# Patient Record
Sex: FEMALE | Race: WHITE | NOT HISPANIC OR LATINO | Employment: OTHER | URBAN - METROPOLITAN AREA
[De-identification: names, ages, dates, MRNs, and addresses within clinical notes are randomized per-mention and may not be internally consistent; named-entity substitution may affect disease eponyms.]

---

## 2017-05-02 ENCOUNTER — GENERIC CONVERSION - ENCOUNTER (OUTPATIENT)
Dept: OTHER | Facility: OTHER | Age: 82
End: 2017-05-02

## 2017-05-11 ENCOUNTER — TRANSCRIBE ORDERS (OUTPATIENT)
Dept: MRI IMAGING | Facility: CLINIC | Age: 82
End: 2017-05-11

## 2017-05-11 ENCOUNTER — APPOINTMENT (OUTPATIENT)
Dept: LAB | Facility: CLINIC | Age: 82
End: 2017-05-11
Payer: COMMERCIAL

## 2017-05-11 DIAGNOSIS — E78.5 HYPERLIPIDEMIA: ICD-10-CM

## 2017-05-11 DIAGNOSIS — I10 ESSENTIAL (PRIMARY) HYPERTENSION: ICD-10-CM

## 2017-05-11 DIAGNOSIS — R73.01 IMPAIRED FASTING GLUCOSE: ICD-10-CM

## 2017-05-11 LAB
ALBUMIN SERPL BCP-MCNC: 3.7 G/DL (ref 3.5–5)
ALP SERPL-CCNC: 75 U/L (ref 46–116)
ALT SERPL W P-5'-P-CCNC: 26 U/L (ref 12–78)
ANION GAP SERPL CALCULATED.3IONS-SCNC: 5 MMOL/L (ref 4–13)
AST SERPL W P-5'-P-CCNC: 12 U/L (ref 5–45)
BASOPHILS # BLD AUTO: 0.05 THOUSANDS/ΜL (ref 0–0.1)
BASOPHILS NFR BLD AUTO: 1 % (ref 0–1)
BILIRUB DIRECT SERPL-MCNC: 0.11 MG/DL (ref 0–0.2)
BILIRUB SERPL-MCNC: 0.51 MG/DL (ref 0.2–1)
BUN SERPL-MCNC: 24 MG/DL (ref 5–25)
CALCIUM SERPL-MCNC: 9.2 MG/DL (ref 8.3–10.1)
CHLORIDE SERPL-SCNC: 106 MMOL/L (ref 100–108)
CHOLEST SERPL-MCNC: 187 MG/DL (ref 50–200)
CO2 SERPL-SCNC: 30 MMOL/L (ref 21–32)
CREAT SERPL-MCNC: 0.86 MG/DL (ref 0.6–1.3)
EOSINOPHIL # BLD AUTO: 0.29 THOUSAND/ΜL (ref 0–0.61)
EOSINOPHIL NFR BLD AUTO: 5 % (ref 0–6)
ERYTHROCYTE [DISTWIDTH] IN BLOOD BY AUTOMATED COUNT: 13.9 % (ref 11.6–15.1)
EST. AVERAGE GLUCOSE BLD GHB EST-MCNC: 120 MG/DL
GFR SERPL CREATININE-BSD FRML MDRD: >60 ML/MIN/1.73SQ M
GLUCOSE P FAST SERPL-MCNC: 91 MG/DL (ref 65–99)
HBA1C MFR BLD: 5.8 % (ref 4.2–6.3)
HCT VFR BLD AUTO: 43.1 % (ref 34.8–46.1)
HDLC SERPL-MCNC: 59 MG/DL (ref 40–60)
HGB BLD-MCNC: 14.3 G/DL (ref 11.5–15.4)
LDLC SERPL CALC-MCNC: 104 MG/DL (ref 0–100)
LYMPHOCYTES # BLD AUTO: 2.29 THOUSANDS/ΜL (ref 0.6–4.47)
LYMPHOCYTES NFR BLD AUTO: 37 % (ref 14–44)
MCH RBC QN AUTO: 30.6 PG (ref 26.8–34.3)
MCHC RBC AUTO-ENTMCNC: 33.2 G/DL (ref 31.4–37.4)
MCV RBC AUTO: 92 FL (ref 82–98)
MONOCYTES # BLD AUTO: 0.58 THOUSAND/ΜL (ref 0.17–1.22)
MONOCYTES NFR BLD AUTO: 9 % (ref 4–12)
NEUTROPHILS # BLD AUTO: 3.01 THOUSANDS/ΜL (ref 1.85–7.62)
NEUTS SEG NFR BLD AUTO: 48 % (ref 43–75)
NRBC BLD AUTO-RTO: 0 /100 WBCS
PLATELET # BLD AUTO: 264 THOUSANDS/UL (ref 149–390)
PMV BLD AUTO: 10.7 FL (ref 8.9–12.7)
POTASSIUM SERPL-SCNC: 4.8 MMOL/L (ref 3.5–5.3)
PROT SERPL-MCNC: 7.4 G/DL (ref 6.4–8.2)
RBC # BLD AUTO: 4.68 MILLION/UL (ref 3.81–5.12)
SODIUM SERPL-SCNC: 141 MMOL/L (ref 136–145)
TRIGL SERPL-MCNC: 122 MG/DL
WBC # BLD AUTO: 6.23 THOUSAND/UL (ref 4.31–10.16)

## 2017-05-11 PROCEDURE — 80048 BASIC METABOLIC PNL TOTAL CA: CPT

## 2017-05-11 PROCEDURE — 80061 LIPID PANEL: CPT

## 2017-05-11 PROCEDURE — 83036 HEMOGLOBIN GLYCOSYLATED A1C: CPT

## 2017-05-11 PROCEDURE — 85025 COMPLETE CBC W/AUTO DIFF WBC: CPT

## 2017-05-11 PROCEDURE — 80076 HEPATIC FUNCTION PANEL: CPT

## 2017-05-11 PROCEDURE — 36415 COLL VENOUS BLD VENIPUNCTURE: CPT

## 2017-05-18 ENCOUNTER — ALLSCRIPTS OFFICE VISIT (OUTPATIENT)
Dept: OTHER | Facility: OTHER | Age: 82
End: 2017-05-18

## 2017-10-17 ENCOUNTER — GENERIC CONVERSION - ENCOUNTER (OUTPATIENT)
Dept: OTHER | Facility: OTHER | Age: 82
End: 2017-10-17

## 2017-11-01 DIAGNOSIS — R73.01 IMPAIRED FASTING GLUCOSE: ICD-10-CM

## 2017-11-01 DIAGNOSIS — E78.5 HYPERLIPIDEMIA: ICD-10-CM

## 2018-01-02 ENCOUNTER — APPOINTMENT (OUTPATIENT)
Dept: LAB | Facility: CLINIC | Age: 83
End: 2018-01-02
Payer: COMMERCIAL

## 2018-01-02 DIAGNOSIS — E78.5 HYPERLIPIDEMIA: ICD-10-CM

## 2018-01-02 DIAGNOSIS — R73.01 IMPAIRED FASTING GLUCOSE: ICD-10-CM

## 2018-01-02 LAB
ALBUMIN SERPL BCP-MCNC: 3.8 G/DL (ref 3.5–5)
ALP SERPL-CCNC: 81 U/L (ref 46–116)
ALT SERPL W P-5'-P-CCNC: 23 U/L (ref 12–78)
ANION GAP SERPL CALCULATED.3IONS-SCNC: 5 MMOL/L (ref 4–13)
AST SERPL W P-5'-P-CCNC: 13 U/L (ref 5–45)
BILIRUB SERPL-MCNC: 0.49 MG/DL (ref 0.2–1)
BUN SERPL-MCNC: 23 MG/DL (ref 5–25)
CALCIUM SERPL-MCNC: 9.6 MG/DL (ref 8.3–10.1)
CHLORIDE SERPL-SCNC: 106 MMOL/L (ref 100–108)
CHOLEST SERPL-MCNC: 212 MG/DL (ref 50–200)
CO2 SERPL-SCNC: 29 MMOL/L (ref 21–32)
CREAT SERPL-MCNC: 0.97 MG/DL (ref 0.6–1.3)
ERYTHROCYTE [DISTWIDTH] IN BLOOD BY AUTOMATED COUNT: 13.4 % (ref 11.6–15.1)
EST. AVERAGE GLUCOSE BLD GHB EST-MCNC: 126 MG/DL
GFR SERPL CREATININE-BSD FRML MDRD: 52 ML/MIN/1.73SQ M
GLUCOSE P FAST SERPL-MCNC: 109 MG/DL (ref 65–99)
HBA1C MFR BLD: 6 % (ref 4.2–6.3)
HCT VFR BLD AUTO: 43.2 % (ref 34.8–46.1)
HDLC SERPL-MCNC: 57 MG/DL (ref 40–60)
HGB BLD-MCNC: 14.3 G/DL (ref 11.5–15.4)
LDLC SERPL CALC-MCNC: 124 MG/DL (ref 0–100)
MCH RBC QN AUTO: 30.2 PG (ref 26.8–34.3)
MCHC RBC AUTO-ENTMCNC: 33.1 G/DL (ref 31.4–37.4)
MCV RBC AUTO: 91 FL (ref 82–98)
PLATELET # BLD AUTO: 264 THOUSANDS/UL (ref 149–390)
PMV BLD AUTO: 10.5 FL (ref 8.9–12.7)
POTASSIUM SERPL-SCNC: 5.2 MMOL/L (ref 3.5–5.3)
PROT SERPL-MCNC: 7.9 G/DL (ref 6.4–8.2)
RBC # BLD AUTO: 4.74 MILLION/UL (ref 3.81–5.12)
SODIUM SERPL-SCNC: 140 MMOL/L (ref 136–145)
TRIGL SERPL-MCNC: 155 MG/DL
WBC # BLD AUTO: 6.33 THOUSAND/UL (ref 4.31–10.16)

## 2018-01-02 PROCEDURE — 80053 COMPREHEN METABOLIC PANEL: CPT

## 2018-01-02 PROCEDURE — 85027 COMPLETE CBC AUTOMATED: CPT

## 2018-01-02 PROCEDURE — 36415 COLL VENOUS BLD VENIPUNCTURE: CPT

## 2018-01-02 PROCEDURE — 83036 HEMOGLOBIN GLYCOSYLATED A1C: CPT

## 2018-01-02 PROCEDURE — 80061 LIPID PANEL: CPT

## 2018-01-09 ENCOUNTER — ALLSCRIPTS OFFICE VISIT (OUTPATIENT)
Dept: OTHER | Facility: OTHER | Age: 83
End: 2018-01-09

## 2018-01-10 NOTE — PROGRESS NOTES
Assessment   1  Hyperlipidemia (272 4) (E78 5)   2  Impaired fasting glucose (790 21) (R73 01)   3  Macular degeneration (362 50) (H35 30)   4  Glaucoma (365 9) (H40 9)   5  Encounter for preventive health examination (V70 0) (Z00 00)    Plan   Hyperlipidemia, Hypertension, Impaired fasting glucose    · (1) CBC/ PLT (NO DIFF); Status:Active; Requested for:[id number];    · (1) COMPREHENSIVE METABOLIC PANEL; Status:Active; Requested for:[id number];    · (1) HEMOGLOBIN A1C; Status:Active; Requested for:70Jlo1649;    · (1) LIPID PANEL, FASTING; Status:Active; Requested for:43Zus8429;    · (1) TSH; Status:Active; Requested for:25Wks3825; Discussion/Summary   Discussion Summary:     continue atorvastatin, exercise and diet   no indication for rx     maintenance she declines further bone densities or mammograms   and macular degeneration under care of ophthalmology   fasting glucose stable with A1c 6 0 discussed them importance of monitoring the diet   follow-up after labs in 6 months, sooner as needed has advanced directives, will bring for our files  Counseling Documentation With Imm: The patient was counseled regarding  Medication SE Review and Pt Understands Tx: Possible side effects of new medications were reviewed with the patient/guardian today  The treatment plan was reviewed with the patient/guardian  The patient/guardian understands and agrees with the treatment plan      Chief Complaint   Chief Complaint Free Text Note Form: routine follow up review labs      History of Present Illness   HPI: active with walking while weather is good phsyical concerns prefer no further mammo or dexa- refuses bisphos to watch what she eats   w/ ophtho for glaucoma and mac deg      Review of Systems   Complete-Female:      Constitutional: No fever, no chills, feels well, no tiredness, no recent weight gain or weight loss        Eyes: No complaints of eye pain, no red eyes, no eyesight problems, no discharge, no dry eyes, no itching of eyes  ENT: no complaints of earache, no loss of hearing, no nose bleeds, no nasal discharge, no sore throat, no hoarseness  Cardiovascular: No complaints of slow heart rate, no fast heart rate, no chest pain, no palpitations, no leg claudication, no lower extremity edema  Respiratory: No complaints of shortness of breath, no wheezing, no cough, no SOB on exertion, no orthopnea, no PND  Gastrointestinal: No complaints of abdominal pain, no constipation, no nausea or vomiting, no diarrhea, no bloody stools  Genitourinary: No complaints of dysuria, no incontinence, no pelvic pain, no dysmenorrhea, no vaginal discharge or bleeding  Musculoskeletal: No complaints of arthralgias, no myalgias, no joint swelling or stiffness, no limb pain or swelling  Integumentary: No complaints of skin rash or lesions, no itching, no skin wounds, no breast pain or lump  Neurological: No complaints of headache, no confusion, no convulsions, no numbness, no dizziness or fainting, no tingling, no limb weakness, no difficulty walking  Psychiatric: Not suicidal, no sleep disturbance, no anxiety or depression, no change in personality, no emotional problems  Endocrine: No complaints of proptosis, no hot flashes, no muscle weakness, no deepening of the voice, no feelings of weakness  Hematologic/Lymphatic: No complaints of swollen glands, no swollen glands in the neck, does not bleed easily, does not bruise easily  Active Problems   1  Active advance directive (V49 89) (Z78 9)   2  Acute pain of left knee (719 46) (M25 562)   3  Advance directive discussed with patient (V65 49) (Z71 89)   4  BPPV (benign paroxysmal positional vertigo) (386 11) (H81 10)   5  Esophageal reflux (530 81) (K21 9)   6  Flu vaccine need (V04 81) (Z23)   7  Glaucoma (365 9) (H40 9)   8  Hyperlipidemia (272 4) (E78 5)   9  Hypertension (401 9) (I10)   10   Impaired fasting glucose (790 21) (R73 01) 11  Macular degeneration (362 50) (H35 30)   12  Need for pneumococcal vaccine (V03 82) (Z23)   13  Onychomycosis of toenail (110 1) (B35 1)   14  Osteoporosis (733 00) (M81 0)   15  Screening for diabetic peripheral neuropathy (V80 09) (Z13 89)   16  Screening for genitourinary condition (V81 6) (Z13 89)   17  Skin neoplasm (239 2) (D49 2)   18  Vitamin D deficiency (268 9) (E55 9)    Past Medical History   1  History of DEXA Body Composition Study   2  History of hyperlipidemia (V12 29) (Z86 39)   3  History of sciatica (V12 49) (Z86 69)   4  History of Osteoporosis (733 00) (M81 0)  Active Problems And Past Medical History Reviewed: The active problems and past medical history were reviewed and updated today  Surgical History   1  History of  Section  Surgical History Reviewed: The surgical history was reviewed and updated today  Family History   Mother    1  Family history of Type 2 Diabetes Mellitus  Father    2  Family history of Cancer  Sister    3  Family history of Colon Cancer (V16 0)  Family History Reviewed: The family history was reviewed and updated today  Social History    · Active advance directive (V49 89) (Z78 9)   · Alcohol Use (History)   · Denied: History of Drug Use   · Never A Smoker   · Occupation: Retired   · Tobacco non-user (V49 89) (Z78 9)   · Tobacco Non-user  Social History Reviewed: The social history was reviewed and updated today  Current Meds    1  Atorvastatin Calcium 20 MG Oral Tablet; take 1 tablet by mouth once daily; Therapy: [id number] to (Evaluate:2018)  Requested for: [id number]; Last Rx:2017     Ordered   2  Calcium 1200 5747-2482 MG-UNIT Oral Tablet Chewable; Therapy: (Recorded:2014) to Recorded   3  Centrum Silver TABS; Therapy: (Recorded:2014) to Recorded   4  CVS Vitamin D CAPS Recorded   5  Pataday 0 2 % Ophthalmic Solution; Therapy: (Recorded:2014) to Recorded   6   Travatan Z 0 004 % Ophthalmic Solution; Therapy: (Recorded:02Apr2014) to Recorded  Medication List Reviewed: The medication list was reviewed and updated today  Allergies   1  No Known Drug Allergies    Vitals   Vital Signs    Recorded: 90RGF7394 10:44AM   Heart Rate 76   Systolic 089   Diastolic 68   Height 4 ft 11 in   Weight 129 lb 6 oz   BMI Calculated 26 13   BSA Calculated 1 53   O2 Saturation 96     Physical Exam        Constitutional      General appearance: No acute distress, well appearing and well nourished  Eyes      Conjunctiva and lids: No swelling, erythema or discharge  Pulmonary      Respiratory effort: No increased work of breathing or signs of respiratory distress  Auscultation of lungs: Clear to auscultation  Cardiovascular      Auscultation of heart: Normal rate and rhythm, normal S1 and S2, without murmurs  Examination of extremities for edema and/or varicosities: Normal        Abdomen      Abdomen: Non-tender, no masses  Lymphatic      Palpation of lymph nodes in neck: No lymphadenopathy  Musculoskeletal      Gait and station: Normal        Skin      Skin and subcutaneous tissue: Normal without rashes or lesions  Neurologic      Cranial nerves: Cranial nerves 2-12 intact  Results/Data   Falls Risk Assessment (Dx Z13 89 Screen for Neurologic Disorder) [id number] 10:47AM User, Ahs      Test Name Result Flag Reference   Falls Risk      No falls in the past year        Health Management   Health Maintenance   Dexa Scan; every 2 years; Next Due: 20Mar2013;  Overdue    Signatures    Electronically signed by : Loyd Miranda [facility]; Jan 9 2018 12:51PM EST                       (Author)     Electronically signed by : GIN Guzman ; Jan 9 2018 10:42PM EST

## 2018-01-13 VITALS
RESPIRATION RATE: 12 BRPM | BODY MASS INDEX: 25.45 KG/M2 | SYSTOLIC BLOOD PRESSURE: 138 MMHG | HEIGHT: 59 IN | WEIGHT: 126.25 LBS | DIASTOLIC BLOOD PRESSURE: 76 MMHG | HEART RATE: 68 BPM

## 2018-01-22 VITALS
WEIGHT: 129.38 LBS | DIASTOLIC BLOOD PRESSURE: 68 MMHG | HEIGHT: 59 IN | SYSTOLIC BLOOD PRESSURE: 138 MMHG | HEART RATE: 76 BPM | BODY MASS INDEX: 26.08 KG/M2 | OXYGEN SATURATION: 96 %

## 2018-07-16 DIAGNOSIS — E78.5 HYPERLIPIDEMIA: ICD-10-CM

## 2018-07-16 DIAGNOSIS — I10 ESSENTIAL (PRIMARY) HYPERTENSION: ICD-10-CM

## 2018-07-16 DIAGNOSIS — R73.01 IMPAIRED FASTING GLUCOSE: ICD-10-CM

## 2018-07-19 ENCOUNTER — APPOINTMENT (OUTPATIENT)
Dept: LAB | Facility: MEDICAL CENTER | Age: 83
End: 2018-07-19
Payer: COMMERCIAL

## 2018-07-19 DIAGNOSIS — I10 ESSENTIAL (PRIMARY) HYPERTENSION: ICD-10-CM

## 2018-07-19 DIAGNOSIS — E78.5 HYPERLIPIDEMIA: ICD-10-CM

## 2018-07-19 DIAGNOSIS — R73.01 IMPAIRED FASTING GLUCOSE: ICD-10-CM

## 2018-07-19 LAB
ALBUMIN SERPL BCP-MCNC: 3.5 G/DL (ref 3.5–5)
ALP SERPL-CCNC: 76 U/L (ref 46–116)
ALT SERPL W P-5'-P-CCNC: 22 U/L (ref 12–78)
ANION GAP SERPL CALCULATED.3IONS-SCNC: 4 MMOL/L (ref 4–13)
AST SERPL W P-5'-P-CCNC: 16 U/L (ref 5–45)
BILIRUB SERPL-MCNC: 0.55 MG/DL (ref 0.2–1)
BUN SERPL-MCNC: 17 MG/DL (ref 5–25)
CALCIUM SERPL-MCNC: 9 MG/DL (ref 8.3–10.1)
CHLORIDE SERPL-SCNC: 107 MMOL/L (ref 100–108)
CHOLEST SERPL-MCNC: 185 MG/DL (ref 50–200)
CO2 SERPL-SCNC: 28 MMOL/L (ref 21–32)
CREAT SERPL-MCNC: 0.84 MG/DL (ref 0.6–1.3)
ERYTHROCYTE [DISTWIDTH] IN BLOOD BY AUTOMATED COUNT: 13.8 % (ref 11.6–15.1)
EST. AVERAGE GLUCOSE BLD GHB EST-MCNC: 120 MG/DL
GFR SERPL CREATININE-BSD FRML MDRD: 62 ML/MIN/1.73SQ M
GLUCOSE P FAST SERPL-MCNC: 110 MG/DL (ref 65–99)
HBA1C MFR BLD: 5.8 % (ref 4.2–6.3)
HCT VFR BLD AUTO: 42.7 % (ref 34.8–46.1)
HDLC SERPL-MCNC: 56 MG/DL (ref 40–60)
HGB BLD-MCNC: 13.4 G/DL (ref 11.5–15.4)
LDLC SERPL CALC-MCNC: 109 MG/DL (ref 0–100)
MCH RBC QN AUTO: 29.5 PG (ref 26.8–34.3)
MCHC RBC AUTO-ENTMCNC: 31.4 G/DL (ref 31.4–37.4)
MCV RBC AUTO: 94 FL (ref 82–98)
NONHDLC SERPL-MCNC: 129 MG/DL
PLATELET # BLD AUTO: 267 THOUSANDS/UL (ref 149–390)
PMV BLD AUTO: 10.7 FL (ref 8.9–12.7)
POTASSIUM SERPL-SCNC: 4 MMOL/L (ref 3.5–5.3)
PROT SERPL-MCNC: 7.3 G/DL (ref 6.4–8.2)
RBC # BLD AUTO: 4.54 MILLION/UL (ref 3.81–5.12)
SODIUM SERPL-SCNC: 139 MMOL/L (ref 136–145)
TRIGL SERPL-MCNC: 100 MG/DL
TSH SERPL DL<=0.05 MIU/L-ACNC: 2.72 UIU/ML (ref 0.36–3.74)
WBC # BLD AUTO: 5.88 THOUSAND/UL (ref 4.31–10.16)

## 2018-07-19 PROCEDURE — 36415 COLL VENOUS BLD VENIPUNCTURE: CPT

## 2018-07-19 PROCEDURE — 80053 COMPREHEN METABOLIC PANEL: CPT

## 2018-07-19 PROCEDURE — 84443 ASSAY THYROID STIM HORMONE: CPT

## 2018-07-19 PROCEDURE — 80061 LIPID PANEL: CPT

## 2018-07-19 PROCEDURE — 83036 HEMOGLOBIN GLYCOSYLATED A1C: CPT

## 2018-07-19 PROCEDURE — 85027 COMPLETE CBC AUTOMATED: CPT

## 2018-07-23 RX ORDER — TRAVOPROST OPHTHALMIC SOLUTION 0.04 MG/ML
SOLUTION OPHTHALMIC
COMMUNITY
End: 2018-07-24

## 2018-07-23 RX ORDER — OLOPATADINE HYDROCHLORIDE 2 MG/ML
SOLUTION/ DROPS OPHTHALMIC
COMMUNITY
End: 2018-07-24

## 2018-07-23 RX ORDER — ATORVASTATIN CALCIUM 20 MG/1
1 TABLET, FILM COATED ORAL DAILY
COMMUNITY
Start: 2016-08-08 | End: 2018-11-06 | Stop reason: SDUPTHER

## 2018-07-23 RX ORDER — ACETAMINOPHEN 500 MG
TABLET ORAL
COMMUNITY
End: 2018-07-24

## 2018-07-24 ENCOUNTER — OFFICE VISIT (OUTPATIENT)
Dept: INTERNAL MEDICINE CLINIC | Facility: CLINIC | Age: 83
End: 2018-07-24
Payer: COMMERCIAL

## 2018-07-24 VITALS
HEIGHT: 59 IN | SYSTOLIC BLOOD PRESSURE: 118 MMHG | HEART RATE: 64 BPM | BODY MASS INDEX: 25.88 KG/M2 | RESPIRATION RATE: 12 BRPM | WEIGHT: 128.4 LBS | DIASTOLIC BLOOD PRESSURE: 70 MMHG

## 2018-07-24 DIAGNOSIS — E78.2 MIXED HYPERLIPIDEMIA: ICD-10-CM

## 2018-07-24 DIAGNOSIS — E55.9 VITAMIN D DEFICIENCY: Primary | ICD-10-CM

## 2018-07-24 DIAGNOSIS — H35.30 MACULAR DEGENERATION OF BOTH EYES, UNSPECIFIED TYPE: ICD-10-CM

## 2018-07-24 DIAGNOSIS — M81.0 AGE-RELATED OSTEOPOROSIS WITHOUT CURRENT PATHOLOGICAL FRACTURE: ICD-10-CM

## 2018-07-24 DIAGNOSIS — Z12.39 SCREENING FOR BREAST CANCER: ICD-10-CM

## 2018-07-24 DIAGNOSIS — R73.01 IMPAIRED FASTING GLUCOSE: ICD-10-CM

## 2018-07-24 PROCEDURE — 99214 OFFICE O/P EST MOD 30 MIN: CPT | Performed by: INTERNAL MEDICINE

## 2018-07-24 PROCEDURE — 1160F RVW MEDS BY RX/DR IN RCRD: CPT | Performed by: INTERNAL MEDICINE

## 2018-07-24 PROCEDURE — 1036F TOBACCO NON-USER: CPT | Performed by: INTERNAL MEDICINE

## 2018-07-24 PROCEDURE — 4040F PNEUMOC VAC/ADMIN/RCVD: CPT | Performed by: INTERNAL MEDICINE

## 2018-07-24 PROCEDURE — 1101F PT FALLS ASSESS-DOCD LE1/YR: CPT | Performed by: INTERNAL MEDICINE

## 2018-07-24 PROCEDURE — 3725F SCREEN DEPRESSION PERFORMED: CPT | Performed by: INTERNAL MEDICINE

## 2018-07-24 RX ORDER — BRINZOLAMIDE/BRIMONIDINE TARTRATE 10; 2 MG/ML; MG/ML
SUSPENSION/ DROPS OPHTHALMIC
Refills: 0 | COMMUNITY
Start: 2018-06-18

## 2018-07-24 RX ORDER — LATANOPROSTENE BUNOD 0.24 MG/ML
SOLUTION/ DROPS OPHTHALMIC
Refills: 0 | COMMUNITY
Start: 2018-07-05

## 2018-07-24 NOTE — PROGRESS NOTES
Assessment/Plan:    Patient Instructions   Lab data reviewed in detail and compared prior     Hyperlipidemia stable on atorvastatin    Impaired fasting glucose stable with A1c 5 8, no indication to change diet or consider medication, continue with very active lifestyle     Osteoporosis-bone density from 2016 reviewed, we had a lengthy discussion regarding pros and cons of bisphosphonates  Patient has been saving to have her dental work completed and agrees to readdress this issue at our six-month follow-up  Continue with weight-bearing exercise and vitamin-D supplements and dietary calcium  Glaucoma and macular degeneration stable under care of Optometry and Ophthalmology    Health maintenance-up-to-date with the exception of mammogram and we discussed the pros and cons of screening mammogram and she would pursue treatment were occult malignancy detected and therefore will proceed with screening mammogram       Routine follow-up after labs in 6 months, sooner as needed  Diagnoses and all orders for this visit:    Vitamin D deficiency    Macular degeneration of both eyes, unspecified type    Mixed hyperlipidemia  -     CBC; Future  -     Comprehensive metabolic panel; Future  -     Lipid panel; Future  -     TSH, 3rd generation with Free T4 reflex; Future    Age-related osteoporosis without current pathological fracture    Impaired fasting glucose  -     Hemoglobin A1c (w/out EAG); Future    Screening for breast cancer  -     Mammo screening bilateral w 3d & cad; Future    Other orders  -     atorvastatin (LIPITOR) 20 mg tablet; Take 1 tablet by mouth daily  -     Discontinue: Calcium Carbonate-Vit D-Min (CALCIUM 1200) 9380-2677 MG-UNIT CHEW; Chew  -     Multiple Vitamins-Minerals (CENTRUM SILVER 50+WOMEN) TABS; Take by mouth  -     Cholecalciferol (CVS VITAMIN D) 2000 units CAPS; Take by mouth  -     Discontinue: olopatadine HCl (PATADAY) 0 2 % opth drops;  Apply to eye  -     Discontinue: travoprost (TRAVATAN Z) 0 004 % ophthalmic solution; Apply to eye  -     SIMBRINZA 1-0 2 % SUSP;   -     VYZULTA 0 024 % SOLN;          Subjective:      Patient ID: Adilia Levy is a 80 y o  female    Feeling great  [facility] [place], now w/ lots to do and new friends  HPL-tolerating atorvastatin  Vision has been stable, 2 new drops  IFG-watching carbs  Exercising regularly w/ walks and into town  Osteoporosis-she stopped calcium d/t constipation, has dietary calcium dialy, vit d daily  She continues to refuse bisphosphonates or other anti resorptive rx  No issues w/ balance, climbs stairs to second fl apt most times             Current Outpatient Prescriptions:     atorvastatin (LIPITOR) 20 mg tablet, Take 1 tablet by mouth daily, Disp: , Rfl:     Cholecalciferol (CVS VITAMIN D) 2000 units CAPS, Take by mouth, Disp: , Rfl:     Multiple Vitamins-Minerals (CENTRUM SILVER 50+WOMEN) TABS, Take by mouth, Disp: , Rfl:     SIMBRINZA 1-0 2 % SUSP, , Disp: , Rfl: 0    VYZULTA 0 024 % SOLN, , Disp: , Rfl: 0    Recent Results (from the past 1008 hour(s))   CBC    Collection Time: 07/19/18  8:05 AM   Result Value Ref Range    WBC 5 88 4 31 - 10 16 Thousand/uL    RBC 4 54 3 81 - 5 12 Million/uL    Hemoglobin 13 4 11 5 - 15 4 g/dL    Hematocrit 42 7 34 8 - 46 1 %    MCV 94 82 - 98 fL    MCH 29 5 26 8 - 34 3 pg    MCHC 31 4 31 4 - 37 4 g/dL    RDW 13 8 11 6 - 15 1 %    Platelets 593 241 - 143 Thousands/uL    MPV 10 7 8 9 - 12 7 fL   Comprehensive metabolic panel    Collection Time: 07/19/18  8:05 AM   Result Value Ref Range    Sodium 139 136 - 145 mmol/L    Potassium 4 0 3 5 - 5 3 mmol/L    Chloride 107 100 - 108 mmol/L    CO2 28 21 - 32 mmol/L    Anion Gap 4 4 - 13 mmol/L    BUN 17 5 - 25 mg/dL    Creatinine 0 84 0 60 - 1 30 mg/dL    Glucose, Fasting 110 (H) 65 - 99 mg/dL    Calcium 9 0 8 3 - 10 1 mg/dL    AST 16 5 - 45 U/L    ALT 22 12 - 78 U/L    Alkaline Phosphatase 76 46 - 116 U/L    Total Protein 7 3 6 4 - 8 2 g/dL    Albumin 3 5 3 5 - 5 0 g/dL    Total Bilirubin 0 55 0 20 - 1 00 mg/dL    eGFR 62 ml/min/1 73sq m   Hemoglobin A1c    Collection Time: 07/19/18  8:05 AM   Result Value Ref Range    Hemoglobin A1C 5 8 4 2 - 6 3 %     mg/dl   Lipid panel    Collection Time: 07/19/18  8:05 AM   Result Value Ref Range    Cholesterol 185 50 - 200 mg/dL    Triglycerides 100 <=150 mg/dL    HDL, Direct 56 40 - 60 mg/dL    LDL Calculated 109 (H) 0 - 100 mg/dL    Non-HDL-Chol (CHOL-HDL) 129 mg/dl   TSH, 3rd generation    Collection Time: 07/19/18  8:05 AM   Result Value Ref Range    TSH 3RD GENERATON 2 720 0 358 - 3 740 uIU/mL       The following portions of the patient's history were reviewed and updated as appropriate: allergies, current medications, past family history, past medical history, past social history, past surgical history and problem list      Review of Systems   Constitutional: Negative for appetite change, chills, diaphoresis, fatigue, fever and unexpected weight change  HENT: Negative for congestion, hearing loss and rhinorrhea  Eyes: Positive for visual disturbance  Respiratory: Negative for cough, chest tightness, shortness of breath and wheezing  Cardiovascular: Negative for chest pain, palpitations and leg swelling  Gastrointestinal: Negative for abdominal pain and blood in stool  Endocrine: Negative for cold intolerance, heat intolerance, polydipsia and polyuria  Genitourinary: Negative for difficulty urinating, dysuria, frequency and urgency  Musculoskeletal: Negative for arthralgias and myalgias  Skin: Negative for rash  Neurological: Negative for dizziness, weakness, light-headedness and headaches  Hematological: Does not bruise/bleed easily  Psychiatric/Behavioral: Negative for dysphoric mood and sleep disturbance           Objective:      Vitals:    07/24/18 0933   BP: 118/70   Pulse: 64   Resp: 12          Physical Exam   Constitutional: She is oriented to person, place, and time  She appears well-developed and well-nourished  HENT:   Head: Normocephalic and atraumatic  Nose: Nose normal    Mouth/Throat: Oropharynx is clear and moist    Eyes: Conjunctivae and EOM are normal  Pupils are equal, round, and reactive to light  No scleral icterus  Neck: Normal range of motion  Neck supple  No JVD present  No tracheal deviation present  No thyromegaly present  Cardiovascular: Normal rate, regular rhythm and intact distal pulses  Exam reveals no gallop and no friction rub  No murmur heard  Pulmonary/Chest: Effort normal and breath sounds normal  No respiratory distress  She has no wheezes  She has no rales  Musculoskeletal: She exhibits no edema or deformity  Lymphadenopathy:     She has no cervical adenopathy  Neurological: She is alert and oriented to person, place, and time  No cranial nerve deficit  Skin: Skin is warm and dry  No rash noted  No erythema  No pallor  Psychiatric: She has a normal mood and affect   Her behavior is normal  Judgment and thought content normal

## 2018-07-24 NOTE — PATIENT INSTRUCTIONS
Lab data reviewed in detail and compared prior     Hyperlipidemia stable on atorvastatin    Impaired fasting glucose stable with A1c 5 8, no indication to change diet or consider medication, continue with very active lifestyle     Osteoporosis-bone density from 2016 reviewed, we had a lengthy discussion regarding pros and cons of bisphosphonates  Patient has been saving to have her dental work completed and agrees to readdress this issue at our six-month follow-up  Continue with weight-bearing exercise and vitamin-D supplements and dietary calcium  Glaucoma and macular degeneration stable under care of Optometry and Ophthalmology    Health maintenance-up-to-date with the exception of mammogram and we discussed the pros and cons of screening mammogram and she would pursue treatment were occult malignancy detected and therefore will proceed with screening mammogram       Routine follow-up after labs in 6 months, sooner as needed

## 2018-08-14 ENCOUNTER — TELEPHONE (OUTPATIENT)
Dept: INTERNAL MEDICINE CLINIC | Facility: CLINIC | Age: 83
End: 2018-08-14

## 2018-08-14 NOTE — TELEPHONE ENCOUNTER
She needs a Dr's note stating that Dr says she can use the small treadmill in the exercise room  Patient said it is very safe    There is a button she can push and it will stop immediately    If she needs it to    She said this is very helpful, especially in the bad weather when she can't get out & walk  Please mail to her home         [address] [facility] [address]  [place], [address]

## 2018-11-06 DIAGNOSIS — E78.5 HYPERLIPIDEMIA, UNSPECIFIED HYPERLIPIDEMIA TYPE: Primary | ICD-10-CM

## 2018-11-06 RX ORDER — ATORVASTATIN CALCIUM 20 MG/1
20 TABLET, FILM COATED ORAL DAILY
Qty: 90 TABLET | Refills: 3 | Status: SHIPPED | OUTPATIENT
Start: 2018-11-06 | End: 2019-05-31 | Stop reason: SDUPTHER

## 2019-01-28 ENCOUNTER — APPOINTMENT (OUTPATIENT)
Dept: LAB | Facility: MEDICAL CENTER | Age: 84
End: 2019-01-28
Payer: COMMERCIAL

## 2019-01-28 DIAGNOSIS — E78.2 MIXED HYPERLIPIDEMIA: ICD-10-CM

## 2019-01-28 DIAGNOSIS — R73.01 IMPAIRED FASTING GLUCOSE: ICD-10-CM

## 2019-01-28 LAB
ALBUMIN SERPL BCP-MCNC: 3.6 G/DL (ref 3.5–5)
ALP SERPL-CCNC: 98 U/L (ref 46–116)
ALT SERPL W P-5'-P-CCNC: 169 U/L (ref 12–78)
ANION GAP SERPL CALCULATED.3IONS-SCNC: 6 MMOL/L (ref 4–13)
AST SERPL W P-5'-P-CCNC: 92 U/L (ref 5–45)
BILIRUB SERPL-MCNC: 0.48 MG/DL (ref 0.2–1)
BUN SERPL-MCNC: 20 MG/DL (ref 5–25)
CALCIUM SERPL-MCNC: 9 MG/DL (ref 8.3–10.1)
CHLORIDE SERPL-SCNC: 106 MMOL/L (ref 100–108)
CHOLEST SERPL-MCNC: 182 MG/DL (ref 50–200)
CO2 SERPL-SCNC: 25 MMOL/L (ref 21–32)
CREAT SERPL-MCNC: 0.76 MG/DL (ref 0.6–1.3)
ERYTHROCYTE [DISTWIDTH] IN BLOOD BY AUTOMATED COUNT: 13.6 % (ref 11.6–15.1)
EST. AVERAGE GLUCOSE BLD GHB EST-MCNC: 123 MG/DL
GFR SERPL CREATININE-BSD FRML MDRD: 70 ML/MIN/1.73SQ M
GLUCOSE P FAST SERPL-MCNC: 100 MG/DL (ref 65–99)
HBA1C MFR BLD: 5.9 % (ref 4.2–6.3)
HCT VFR BLD AUTO: 45.6 % (ref 34.8–46.1)
HDLC SERPL-MCNC: 50 MG/DL (ref 40–60)
HGB BLD-MCNC: 14.4 G/DL (ref 11.5–15.4)
LDLC SERPL CALC-MCNC: 108 MG/DL (ref 0–100)
MCH RBC QN AUTO: 29.6 PG (ref 26.8–34.3)
MCHC RBC AUTO-ENTMCNC: 31.6 G/DL (ref 31.4–37.4)
MCV RBC AUTO: 94 FL (ref 82–98)
NONHDLC SERPL-MCNC: 132 MG/DL
PLATELET # BLD AUTO: 267 THOUSANDS/UL (ref 149–390)
PMV BLD AUTO: 10.3 FL (ref 8.9–12.7)
POTASSIUM SERPL-SCNC: 4.5 MMOL/L (ref 3.5–5.3)
PROT SERPL-MCNC: 7.8 G/DL (ref 6.4–8.2)
RBC # BLD AUTO: 4.86 MILLION/UL (ref 3.81–5.12)
SODIUM SERPL-SCNC: 137 MMOL/L (ref 136–145)
TRIGL SERPL-MCNC: 119 MG/DL
TSH SERPL DL<=0.05 MIU/L-ACNC: 2.03 UIU/ML (ref 0.36–3.74)
WBC # BLD AUTO: 6.61 THOUSAND/UL (ref 4.31–10.16)

## 2019-01-28 PROCEDURE — 80053 COMPREHEN METABOLIC PANEL: CPT

## 2019-01-28 PROCEDURE — 85027 COMPLETE CBC AUTOMATED: CPT

## 2019-01-28 PROCEDURE — 84443 ASSAY THYROID STIM HORMONE: CPT

## 2019-01-28 PROCEDURE — 80061 LIPID PANEL: CPT

## 2019-01-28 PROCEDURE — 36415 COLL VENOUS BLD VENIPUNCTURE: CPT

## 2019-01-28 PROCEDURE — 83036 HEMOGLOBIN GLYCOSYLATED A1C: CPT

## 2019-01-30 RX ORDER — NETARSUDIL 0.2 MG/ML
SOLUTION/ DROPS OPHTHALMIC; TOPICAL
Refills: 0 | COMMUNITY
Start: 2018-11-07

## 2019-02-01 ENCOUNTER — OFFICE VISIT (OUTPATIENT)
Dept: INTERNAL MEDICINE CLINIC | Facility: CLINIC | Age: 84
End: 2019-02-01
Payer: COMMERCIAL

## 2019-02-01 VITALS
HEART RATE: 66 BPM | BODY MASS INDEX: 26.21 KG/M2 | HEIGHT: 59 IN | SYSTOLIC BLOOD PRESSURE: 128 MMHG | RESPIRATION RATE: 12 BRPM | WEIGHT: 130 LBS | DIASTOLIC BLOOD PRESSURE: 78 MMHG

## 2019-02-01 DIAGNOSIS — I10 ESSENTIAL HYPERTENSION: ICD-10-CM

## 2019-02-01 DIAGNOSIS — R73.01 IMPAIRED FASTING GLUCOSE: Primary | ICD-10-CM

## 2019-02-01 DIAGNOSIS — R74.01 TRANSAMINITIS: ICD-10-CM

## 2019-02-01 DIAGNOSIS — K21.9 GASTROESOPHAGEAL REFLUX DISEASE WITHOUT ESOPHAGITIS: ICD-10-CM

## 2019-02-01 DIAGNOSIS — H35.30 MACULAR DEGENERATION OF BOTH EYES, UNSPECIFIED TYPE: ICD-10-CM

## 2019-02-01 DIAGNOSIS — D49.2 SKIN NEOPLASM: ICD-10-CM

## 2019-02-01 DIAGNOSIS — M81.0 AGE-RELATED OSTEOPOROSIS WITHOUT CURRENT PATHOLOGICAL FRACTURE: ICD-10-CM

## 2019-02-01 DIAGNOSIS — E78.2 MIXED HYPERLIPIDEMIA: ICD-10-CM

## 2019-02-01 PROCEDURE — 99214 OFFICE O/P EST MOD 30 MIN: CPT | Performed by: INTERNAL MEDICINE

## 2019-02-01 PROCEDURE — 1036F TOBACCO NON-USER: CPT | Performed by: INTERNAL MEDICINE

## 2019-02-01 PROCEDURE — G0439 PPPS, SUBSEQ VISIT: HCPCS | Performed by: INTERNAL MEDICINE

## 2019-02-01 PROCEDURE — 1125F AMNT PAIN NOTED PAIN PRSNT: CPT | Performed by: INTERNAL MEDICINE

## 2019-02-01 PROCEDURE — 1170F FXNL STATUS ASSESSED: CPT | Performed by: INTERNAL MEDICINE

## 2019-02-01 NOTE — PATIENT INSTRUCTIONS
Lab data reviewed in detail and compared prior    Impaired fasting glucose remained stable at 5 9    Osteoporosis without fracture-patient continues to need dental work and declines bisphosphonate at this time    GERD stable with rare use of Pepcid complete    Mild transaminitis without symptoms or abnormality on physical exam-recheck liver panel in 1 month and follow accordingly    Skin lesions on back-referral to Dermatology    Hyperlipidemia stable on present regimen    Health maintenance is up-to-date with the exception of mammogram which patient declines at this time    Routine follow-up after labs in 6 months, sooner as needed

## 2019-02-01 NOTE — PROGRESS NOTES
Assessment/Plan:    Patient Instructions   Lab data reviewed in detail and compared prior    Impaired fasting glucose remained stable at 5 9    Osteoporosis without fracture-patient continues to need dental work and declines bisphosphonate at this time    GERD stable with rare use of Pepcid complete    Mild transaminitis without symptoms or abnormality on physical exam-recheck liver panel in 1 month and follow accordingly    Skin lesions on back-referral to Dermatology    Hyperlipidemia stable on present regimen    Health maintenance is up-to-date with the exception of mammogram which patient declines at this time    Routine follow-up after labs in 6 months, sooner as needed  Diagnoses and all orders for this visit:    Impaired fasting glucose  -     Hemoglobin A1C; Future    Essential hypertension  -     CBC and differential; Future  -     Comprehensive metabolic panel; Future  -     TSH, 3rd generation with Free T4 reflex; Future    Age-related osteoporosis without current pathological fracture    Mixed hyperlipidemia  -     Lipid panel; Future    Gastroesophageal reflux disease without esophagitis    Macular degeneration of both eyes, unspecified type    Transaminitis  -     Hepatic function panel; Future    Skin neoplasm  -     Ambulatory referral to Dermatology; Future    Other orders  -     RHOPRESSA 0 02 % SOLN; instill 1 drop into both eyes at bedtime         Subjective:      Patient ID: Chyna Verma is a 80 y o  female    Feeling generally well  Happy with move to [facility], now w/ lots to do and new friends  HPL-tolerating atorvastatin  Vision has been stable w/ MD f/u every 6 mos, glaucoma f/u q 4 mos  IFG-watching carbs  Exercising regularly w/ walks in the halls and stairs when cold,  and into town when weather permits  Osteoporosis- She continues to refuse bisphosphonates or other anti resorptive rx  No issues w/ balance, climbs stairs to second fl apt most times    Still cooking and cleaning for self, no driving x 10 yrs  [facility]/ [facility] apts     She notes scraping office skin lesion in the middle of her back with a rough towel about 2 weeks ago and she continues to have some irritation in the area          Current Outpatient Prescriptions:     atorvastatin (LIPITOR) 20 mg tablet, Take 1 tablet (20 mg total) by mouth daily, Disp: 90 tablet, Rfl: 3    Cholecalciferol (CVS VITAMIN D) 2000 units CAPS, Take by mouth, Disp: , Rfl:     Multiple Vitamins-Minerals (CENTRUM SILVER 50+WOMEN) TABS, Take by mouth, Disp: , Rfl:     RHOPRESSA 0 02 % SOLN, instill 1 drop into both eyes at bedtime, Disp: , Rfl: 0    SIMBRINZA 1-0 2 % SUSP, , Disp: , Rfl: 0    VYZULTA 0 024 % SOLN, , Disp: , Rfl: 0    Recent Results (from the past 1008 hour(s))   CBC    Collection Time: 01/28/19  8:48 AM   Result Value Ref Range    WBC 6 61 4 31 - 10 16 Thousand/uL    RBC 4 86 3 81 - 5 12 Million/uL    Hemoglobin 14 4 11 5 - 15 4 g/dL    Hematocrit 45 6 34 8 - 46 1 %    MCV 94 82 - 98 fL    MCH 29 6 26 8 - 34 3 pg    MCHC 31 6 31 4 - 37 4 g/dL    RDW 13 6 11 6 - 15 1 %    Platelets 220 690 - 600 Thousands/uL    MPV 10 3 8 9 - 12 7 fL   Comprehensive metabolic panel    Collection Time: 01/28/19  8:48 AM   Result Value Ref Range    Sodium 137 136 - 145 mmol/L    Potassium 4 5 3 5 - 5 3 mmol/L    Chloride 106 100 - 108 mmol/L    CO2 25 21 - 32 mmol/L    ANION GAP 6 4 - 13 mmol/L    BUN 20 5 - 25 mg/dL    Creatinine 0 76 0 60 - 1 30 mg/dL    Glucose, Fasting 100 (H) 65 - 99 mg/dL    Calcium 9 0 8 3 - 10 1 mg/dL    AST 92 (H) 5 - 45 U/L     (H) 12 - 78 U/L    Alkaline Phosphatase 98 46 - 116 U/L    Total Protein 7 8 6 4 - 8 2 g/dL    Albumin 3 6 3 5 - 5 0 g/dL    Total Bilirubin 0 48 0 20 - 1 00 mg/dL    eGFR 70 ml/min/1 73sq m   Lipid panel    Collection Time: 01/28/19  8:48 AM   Result Value Ref Range    Cholesterol 182 50 - 200 mg/dL    Triglycerides 119 <=150 mg/dL    HDL, Direct 50 40 - 60 mg/dL    LDL Calculated 108 (H) 0 - 100 mg/dL    Non-HDL-Chol (CHOL-HDL) 132 mg/dl   TSH, 3rd generation with Free T4 reflex    Collection Time: 01/28/19  8:48 AM   Result Value Ref Range    TSH 3RD GENERATON 2 030 0 358 - 3 740 uIU/mL   Hemoglobin A1C    Collection Time: 01/28/19  8:49 AM   Result Value Ref Range    Hemoglobin A1C 5 9 4 2 - 6 3 %     mg/dl       The following portions of the patient's history were reviewed and updated as appropriate: allergies, current medications, past family history, past medical history, past social history, past surgical history and problem list      Review of Systems   Constitutional: Negative for appetite change, chills, diaphoresis, fatigue, fever and unexpected weight change  HENT: Negative for congestion, hearing loss and rhinorrhea  Eyes: Negative for visual disturbance  Respiratory: Negative for cough, chest tightness, shortness of breath and wheezing  Cardiovascular: Negative for chest pain, palpitations and leg swelling  Gastrointestinal: Negative for abdominal pain and blood in stool  Endocrine: Negative for cold intolerance, heat intolerance, polydipsia and polyuria  Genitourinary: Negative for difficulty urinating, dysuria, frequency and urgency  Musculoskeletal: Negative for arthralgias and myalgias  Skin: Positive for wound  Negative for rash  Neurological: Negative for dizziness, weakness, light-headedness and headaches  Hematological: Does not bruise/bleed easily  Psychiatric/Behavioral: Negative for dysphoric mood and sleep disturbance  Objective:      Vitals:    02/01/19 1322   BP: 128/78   Pulse: 66   Resp: 12          Physical Exam   Constitutional: She is oriented to person, place, and time  She appears well-developed and well-nourished  HENT:   Head: Normocephalic and atraumatic     Nose: Nose normal    Mouth/Throat: Oropharynx is clear and moist    Eyes: Pupils are equal, round, and reactive to light  Conjunctivae and EOM are normal  No scleral icterus  Neck: Normal range of motion  Neck supple  No JVD present  No tracheal deviation present  No thyromegaly present  Cardiovascular: Normal rate, regular rhythm and intact distal pulses  Exam reveals no gallop and no friction rub  No murmur heard  Pulmonary/Chest: Effort normal and breath sounds normal  No respiratory distress  She has no wheezes  She has no rales  Abdominal: Soft  Bowel sounds are normal  She exhibits no distension and no mass  There is no tenderness  There is no rebound and no guarding  Musculoskeletal: She exhibits no edema, tenderness or deformity  Lymphadenopathy:     She has no cervical adenopathy  Neurological: She is alert and oriented to person, place, and time  No cranial nerve deficit  Skin: Skin is warm and dry  No rash noted  No erythema  No pallor  Scaly macular area in right mid back, right upper back there is a 6 mm pearly papular lesion   Psychiatric: She has a normal mood and affect   Her behavior is normal  Judgment and thought content normal

## 2019-02-01 NOTE — PROGRESS NOTES
Assessment and Plan:    Problem List Items Addressed This Visit     None        Health Maintenance Due   Topic Date Due    Medicare Annual Wellness Visit (AWV)  05/15/1929    DTaP,Tdap,and Td Vaccines (1 - Tdap) 05/15/1950    DXA SCAN  2018         HPI:  Kelvin Vidal is a 80 y o  female here for her Subsequent Wellness Visit  Patient Active Problem List   Diagnosis    Esophageal reflux    Hyperlipidemia    Hypertension    Impaired fasting glucose    Macular degeneration    Osteoporosis    Vitamin D deficiency     Past Medical History:   Diagnosis Date    Hyperlipidemia     Osteoporosis     Sciatica      Past Surgical History:   Procedure Laterality Date     SECTION       Family History   Problem Relation Age of Onset    Diabetes type II Mother     Pancreatic cancer Father     Colon cancer Sister      History   Smoking Status    Never Smoker   Smokeless Tobacco    Never Used     Comment: Tobacco non-user     History   Alcohol Use    Yes     Comment: occasional       History   Drug Use No       Current Outpatient Prescriptions   Medication Sig Dispense Refill    atorvastatin (LIPITOR) 20 mg tablet Take 1 tablet (20 mg total) by mouth daily 90 tablet 3    Cholecalciferol ([employer] VITAMIN D) 2000 units CAPS Take by mouth      Multiple Vitamins-Minerals (CENTRUM SILVER 50+WOMEN) TABS Take by mouth      RHOPRESSA 0 02 % SOLN instill 1 drop into both eyes at bedtime  0    SIMBRINZA 1-0 2 % SUSP   0    VYZULTA 0 024 % SOLN   0     No current facility-administered medications for this visit        No Known Allergies  Immunization History   Administered Date(s) Administered    Influenza 2003, 10/23/2013    Influenza Split High Dose Preservative Free IM 10/15/2014, 2015, 2016    Influenza TIV (IM) 10/19/2017    Pneumococcal Conjugate 13-Valent 2016    Pneumococcal Polysaccharide PPV23 05/15/1929, 10/19/2017    Tdap [birth year]15[birth year]29       Patient Care Team:  Kosta Ferguson MD Nic as PCP - Julia Lenz MD    Medicare Screening Tests and Risk Assessments:  Noemy Rod is here for her Subsequent Wellness visit  Last Medicare Wellness visit information reviewed, patient interviewed and updates made to the record today  Health Risk Assessment:  Patient rates overall health as very good  Patient feels that their physical health rating is Same  Eyesight was rated as Slightly worse  Hearing was rated as Slightly worse  Patient feels that their emotional and mental health rating is Same  Pain experienced by patient in the last 7 days has been None  Emotional/Mental Health:  Patient has been feeling nervous/anxious  PHQ-9 Depression Screening:    Frequency of the following problems over the past two weeks:      1  Little interest or pleasure in doing things: 0 - not at all      2  Feeling down, depressed, or hopeless: 0 - not at all  PHQ-2 Score: 0          Broken Bones/Falls: Fall Risk Assessment:    In the past year, patient has experienced: No history of falling in past year          Bladder/Bowel:  Patient has not leaked urine accidently in the last six months  Patient reports no loss of bowel control  Immunizations:  Patient has had a flu vaccination within the last year  Patient has received a pneumonia shot  Patient has not received a shingles shot  Home Safety:  Patient does not have trouble with stairs inside or outside of their home  Patient currently reports that there are no safety hazards present in home, working smoke alarms, working carbon monoxide detectors  Preventative Screenings:   No breast cancer screening performed, no colon cancer screen completed, cholesterol screen completed, 1/28/2019  glaucoma eye exam completed, 6/1/2018      Nutrition:  Current diet: Regular with servings of the following:    Medications:  Patient is currently taking over-the-counter supplements  Patient is able to manage medications      Lifestyle Choices:  Patient reports no tobacco use  Patient has not smoked or used tobacco in the past   Patient reports no alcohol use  Patient does not drive a vehicle  Patient wears seat belt  Current level of exercise of physical activity described by patient as: Walking regular   Activities of Daily Living:  Can get out of bed by his or her self, able to dress self, able to make own meals, able to do own shopping, able to bathe self, can do own laundry/housekeeping, can manage own money, pay bills and track expenses    Previous Hospitalizations:  No hospitalization or ED visit in past 12 months        Advanced Directives:  Patient has decided on a power of   Patient has spoken to designated power of   Patient has completed advanced directive  Preventative Screening/Counseling:      Cardiovascular:      General: Screening Current          Diabetes:      General: Screening Current          Colorectal Cancer:      General: Screening Not Indicated          Breast Cancer:      General: Patient Declines          Cervical Cancer:      General: Screening Not Indicated          Osteoporosis:      General: Risks and Benefits Discussed          AAA:      General: Screening Not Indicated          Glaucoma:      General: Screening Current          HIV:      General: Screening Not Indicated          Hepatitis C:      General: Screening Not Indicated        Advanced Directives:   Patient has living will for healthcare, has durable POA for healthcare, patient has an advanced directive

## 2019-02-28 ENCOUNTER — OFFICE VISIT (OUTPATIENT)
Dept: DERMATOLOGY | Facility: CLINIC | Age: 84
End: 2019-02-28
Payer: COMMERCIAL

## 2019-02-28 DIAGNOSIS — L82.1 SEBORRHEIC KERATOSIS: Primary | ICD-10-CM

## 2019-02-28 DIAGNOSIS — D22.9 NEVUS: ICD-10-CM

## 2019-02-28 DIAGNOSIS — Z13.89 SCREENING FOR SKIN CONDITION: ICD-10-CM

## 2019-02-28 PROCEDURE — 99203 OFFICE O/P NEW LOW 30 MIN: CPT | Performed by: DERMATOLOGY

## 2019-02-28 NOTE — PATIENT INSTRUCTIONS
Seborrheic Keratosis  Patient reasurred these are normal growths we acquire with age no treatment needed    Nevi reviewed the concept of ABCDE and ugly duckling nothing markedly atypical patient reassured  Screening for Dermatologic Disorders: Nothing else of concern noted on complete exam follow up prn

## 2019-02-28 NOTE — PROGRESS NOTES
[address]  [address]  [place]  [phone]  [phone]     MRN: [id number] : 5/15/1929  Encounter: [id number]  Patient Information: Roseanne Enamorado  Chief complaint:  Concerns regarding growth on her back    History of present illness:  63-year-old female with history of a moderately dysplastic nevus removed 3 and half years ago at [id number] [facility] presents because of concerns regarding a mole on her back or irritation on the back that was noted by Dr Uriel Stanley last visit several weeks ago  Patient not exactly sure the site  Past Medical History:   Diagnosis Date    Hyperlipidemia     Osteoporosis     Sciatica      Past Surgical History:   Procedure Laterality Date     SECTION       Social History   Social History     Substance and Sexual Activity   Alcohol Use Yes    Comment: occasional      Social History     Substance and Sexual Activity   Drug Use No     Social History     Tobacco Use   Smoking Status Never Smoker   Smokeless Tobacco Never Used   Tobacco Comment    Tobacco non-user     Family History   Problem Relation Age of Onset    Diabetes type II Mother     Pancreatic cancer Father     Colon cancer Sister      Meds/Allergies   No Known Allergies    Meds:  Prior to Admission medications    Medication Sig Start Date End Date Taking?  Authorizing Provider   atorvastatin (LIPITOR) 20 mg tablet Take 1 tablet (20 mg total) by mouth daily 18  Yes Dayanna Frost MD   Cholecalciferol (CVS VITAMIN D) 2000 units CAPS Take by mouth   Yes Historical Provider, MD   Multiple Vitamins-Minerals (CENTRUM SILVER 50+WOMEN) TABS Take by mouth   Yes Historical Provider, MD   RHOPRESSA 0 02 % SOLN instill 1 drop into both eyes at bedtime 18  Yes Historical Provider, MD Oksana Pina 1-0 2 % SUSP  18  Yes Historical Provider, MD   VYZULTA 0 024 % SOLN  18  Yes Historical Provider, MD       Subjective:     Review of Systems:    General: negative for - chills, fatigue, fever,  weight gain or weight loss  Psychological: negative for - anxiety, behavioral disorder, concentration difficulties, decreased libido, depression, irritability, memory difficulties, mood swings, sleep disturbances or suicidal ideation  ENT: negative for - hearing difficulties , nasal congestion, nasal discharge, oral lesions, sinus pain, sneezing, sore throat  Allergy and Immunology: negative for - hives, insect bite sensitivity,  Hematological and Lymphatic: negative for - bleeding problems, blood clots,bruising, swollen lymph nodes  Endocrine: negative for - hair pattern changes, hot flashes, malaise/lethargy, mood swings, palpitations, polydipsia/polyuria, skin changes, temperature intolerance or unexpected weight change  Respiratory: negative for - cough, hemoptysis, orthopnea, shortness of breath, or wheezing  Cardiovascular: negative for - chest pain, dyspnea on exertion, edema,  Gastrointestinal: negative for - abdominal pain, nausea/vomiting  Genito-Urinary: negative for - dysuria, incontinence, irregular/heavy menses or urinary frequency/urgency  Musculoskeletal: negative for - gait disturbance, joint pain, joint stiffness, joint swelling, muscle pain, muscular weakness  Dermatological:  As in HPI  Neurological: negative for confusion, dizziness, headaches, impaired coordination/balance, memory loss, numbness/tingling, seizures, speech problems, tremors or weakness       Objective: There were no vitals taken for this visit      Physical Exam:    General Appearance:    Alert, cooperative, no distress   Head:    Normocephalic, without obvious abnormality, atraumatic           Skin:   A full skin exam was performed including scalp, head scalp, eyes, ears, nose, lips, neck, chest, axilla, abdomen, back, buttocks, bilateral upper extremities, bilateral lower extremities, hands, feet, fingers, toes, fingernails, and toenails normal keratotic papules greasy stuck on appearance normal pigmented lesions regular shape and color nothing markedly atypical noted on exam     Assessment:     1  Seborrheic keratosis     2  Screening for skin condition     3  Nevus           Plan:   Seborrheic Keratosis  Patient reasurred these are normal growths we acquire with age no treatment needed  Nevi reviewed the concept of ABCDE and ugly duckling nothing markedly atypical patient reassured  Screening for Dermatologic Disorders: Nothing else of concern noted on complete exam follow up diana Hernandez MD  2/28/2019,11:04 AM    Portions of the record may have been created with voice recognition software   Occasional wrong word or "sound a like" substitutions may have occurred due to the inherent limitations of voice recognition software   Read the chart carefully and recognize, using context, where substitutions have occurred

## 2019-03-08 ENCOUNTER — TELEPHONE (OUTPATIENT)
Dept: INTERNAL MEDICINE CLINIC | Facility: CLINIC | Age: 84
End: 2019-03-08

## 2019-03-08 ENCOUNTER — APPOINTMENT (OUTPATIENT)
Dept: LAB | Facility: MEDICAL CENTER | Age: 84
End: 2019-03-08
Payer: COMMERCIAL

## 2019-03-08 DIAGNOSIS — R74.01 TRANSAMINITIS: ICD-10-CM

## 2019-03-08 LAB
ALBUMIN SERPL BCP-MCNC: 3.7 G/DL (ref 3.5–5)
ALP SERPL-CCNC: 90 U/L (ref 46–116)
ALT SERPL W P-5'-P-CCNC: 78 U/L (ref 12–78)
AST SERPL W P-5'-P-CCNC: 52 U/L (ref 5–45)
BILIRUB DIRECT SERPL-MCNC: 0.17 MG/DL (ref 0–0.2)
BILIRUB SERPL-MCNC: 0.85 MG/DL (ref 0.2–1)
PROT SERPL-MCNC: 7.6 G/DL (ref 6.4–8.2)

## 2019-03-08 PROCEDURE — 36415 COLL VENOUS BLD VENIPUNCTURE: CPT

## 2019-03-08 PROCEDURE — 80076 HEPATIC FUNCTION PANEL: CPT

## 2019-03-08 NOTE — TELEPHONE ENCOUNTER
----- Message from Siva Oropeza MD sent at 3/8/2019  5:41 PM EST -----  Notify-liver enzymes have improved dramatically, 1 completely normalized and the other is very close to normal, no further workup unless she is symptomatic with abdominal pain, nausea, vomiting or other concerning symptoms    We will just follow this up again at our routine 6 month follow-up

## 2019-05-31 DIAGNOSIS — E78.5 HYPERLIPIDEMIA, UNSPECIFIED HYPERLIPIDEMIA TYPE: ICD-10-CM

## 2019-05-31 RX ORDER — ATORVASTATIN CALCIUM 20 MG/1
20 TABLET, FILM COATED ORAL DAILY
Qty: 90 TABLET | Refills: 3 | Status: SHIPPED | OUTPATIENT
Start: 2019-05-31 | End: 2020-03-18

## 2019-07-10 ENCOUNTER — APPOINTMENT (OUTPATIENT)
Dept: LAB | Facility: MEDICAL CENTER | Age: 84
End: 2019-07-10
Payer: COMMERCIAL

## 2019-07-10 DIAGNOSIS — R73.01 IMPAIRED FASTING GLUCOSE: ICD-10-CM

## 2019-07-10 DIAGNOSIS — I10 ESSENTIAL HYPERTENSION: ICD-10-CM

## 2019-07-10 DIAGNOSIS — E78.2 MIXED HYPERLIPIDEMIA: ICD-10-CM

## 2019-07-10 LAB
ALBUMIN SERPL BCP-MCNC: 3.3 G/DL (ref 3.5–5)
ALP SERPL-CCNC: 95 U/L (ref 46–116)
ALT SERPL W P-5'-P-CCNC: 63 U/L (ref 12–78)
ANION GAP SERPL CALCULATED.3IONS-SCNC: 5 MMOL/L (ref 4–13)
AST SERPL W P-5'-P-CCNC: 41 U/L (ref 5–45)
BASOPHILS # BLD AUTO: 0.06 THOUSANDS/ΜL (ref 0–0.1)
BASOPHILS NFR BLD AUTO: 1 % (ref 0–1)
BILIRUB SERPL-MCNC: 0.42 MG/DL (ref 0.2–1)
BUN SERPL-MCNC: 17 MG/DL (ref 5–25)
CALCIUM SERPL-MCNC: 9 MG/DL (ref 8.3–10.1)
CHLORIDE SERPL-SCNC: 106 MMOL/L (ref 100–108)
CHOLEST SERPL-MCNC: 210 MG/DL (ref 50–200)
CO2 SERPL-SCNC: 27 MMOL/L (ref 21–32)
CREAT SERPL-MCNC: 0.83 MG/DL (ref 0.6–1.3)
EOSINOPHIL # BLD AUTO: 0.23 THOUSAND/ΜL (ref 0–0.61)
EOSINOPHIL NFR BLD AUTO: 4 % (ref 0–6)
ERYTHROCYTE [DISTWIDTH] IN BLOOD BY AUTOMATED COUNT: 13.3 % (ref 11.6–15.1)
EST. AVERAGE GLUCOSE BLD GHB EST-MCNC: 123 MG/DL
GFR SERPL CREATININE-BSD FRML MDRD: 62 ML/MIN/1.73SQ M
GLUCOSE P FAST SERPL-MCNC: 106 MG/DL (ref 65–99)
HBA1C MFR BLD: 5.9 % (ref 4.2–6.3)
HCT VFR BLD AUTO: 42.8 % (ref 34.8–46.1)
HDLC SERPL-MCNC: 45 MG/DL (ref 40–60)
HGB BLD-MCNC: 13.7 G/DL (ref 11.5–15.4)
IMM GRANULOCYTES # BLD AUTO: 0.02 THOUSAND/UL (ref 0–0.2)
IMM GRANULOCYTES NFR BLD AUTO: 0 % (ref 0–2)
LDLC SERPL CALC-MCNC: 137 MG/DL (ref 0–100)
LYMPHOCYTES # BLD AUTO: 1.6 THOUSANDS/ΜL (ref 0.6–4.47)
LYMPHOCYTES NFR BLD AUTO: 29 % (ref 14–44)
MCH RBC QN AUTO: 30 PG (ref 26.8–34.3)
MCHC RBC AUTO-ENTMCNC: 32 G/DL (ref 31.4–37.4)
MCV RBC AUTO: 94 FL (ref 82–98)
MONOCYTES # BLD AUTO: 0.63 THOUSAND/ΜL (ref 0.17–1.22)
MONOCYTES NFR BLD AUTO: 12 % (ref 4–12)
NEUTROPHILS # BLD AUTO: 2.93 THOUSANDS/ΜL (ref 1.85–7.62)
NEUTS SEG NFR BLD AUTO: 54 % (ref 43–75)
NONHDLC SERPL-MCNC: 165 MG/DL
NRBC BLD AUTO-RTO: 0 /100 WBCS
PLATELET # BLD AUTO: 262 THOUSANDS/UL (ref 149–390)
PMV BLD AUTO: 10.9 FL (ref 8.9–12.7)
POTASSIUM SERPL-SCNC: 4.1 MMOL/L (ref 3.5–5.3)
PROT SERPL-MCNC: 7.5 G/DL (ref 6.4–8.2)
RBC # BLD AUTO: 4.57 MILLION/UL (ref 3.81–5.12)
SODIUM SERPL-SCNC: 138 MMOL/L (ref 136–145)
TRIGL SERPL-MCNC: 140 MG/DL
TSH SERPL DL<=0.05 MIU/L-ACNC: 2.02 UIU/ML (ref 0.36–3.74)
WBC # BLD AUTO: 5.47 THOUSAND/UL (ref 4.31–10.16)

## 2019-07-10 PROCEDURE — 84443 ASSAY THYROID STIM HORMONE: CPT

## 2019-07-10 PROCEDURE — 80061 LIPID PANEL: CPT

## 2019-07-10 PROCEDURE — 83036 HEMOGLOBIN GLYCOSYLATED A1C: CPT

## 2019-07-10 PROCEDURE — 80053 COMPREHEN METABOLIC PANEL: CPT

## 2019-07-10 PROCEDURE — 36415 COLL VENOUS BLD VENIPUNCTURE: CPT

## 2019-07-10 PROCEDURE — 85025 COMPLETE CBC W/AUTO DIFF WBC: CPT

## 2019-07-12 ENCOUNTER — OFFICE VISIT (OUTPATIENT)
Dept: INTERNAL MEDICINE CLINIC | Facility: CLINIC | Age: 84
End: 2019-07-12
Payer: COMMERCIAL

## 2019-07-12 VITALS
BODY MASS INDEX: 25.44 KG/M2 | WEIGHT: 126.2 LBS | HEART RATE: 65 BPM | HEIGHT: 59 IN | SYSTOLIC BLOOD PRESSURE: 114 MMHG | DIASTOLIC BLOOD PRESSURE: 70 MMHG

## 2019-07-12 DIAGNOSIS — H35.30 MACULAR DEGENERATION OF BOTH EYES, UNSPECIFIED TYPE: ICD-10-CM

## 2019-07-12 DIAGNOSIS — E78.2 MIXED HYPERLIPIDEMIA: ICD-10-CM

## 2019-07-12 DIAGNOSIS — E55.9 VITAMIN D DEFICIENCY: ICD-10-CM

## 2019-07-12 DIAGNOSIS — R73.01 IMPAIRED FASTING GLUCOSE: Primary | ICD-10-CM

## 2019-07-12 DIAGNOSIS — Z80.0 FAMILY HISTORY OF COLON CANCER: ICD-10-CM

## 2019-07-12 DIAGNOSIS — M81.0 AGE-RELATED OSTEOPOROSIS WITHOUT CURRENT PATHOLOGICAL FRACTURE: ICD-10-CM

## 2019-07-12 PROCEDURE — 1160F RVW MEDS BY RX/DR IN RCRD: CPT | Performed by: INTERNAL MEDICINE

## 2019-07-12 PROCEDURE — 99214 OFFICE O/P EST MOD 30 MIN: CPT | Performed by: INTERNAL MEDICINE

## 2019-07-12 PROCEDURE — 1036F TOBACCO NON-USER: CPT | Performed by: INTERNAL MEDICINE

## 2019-07-12 NOTE — PROGRESS NOTES
Assessment/Plan:    Diagnoses and all orders for this visit:    Impaired fasting glucose  -     Hemoglobin A1C; Future    Age-related osteoporosis without current pathological fracture    Mixed hyperlipidemia  -     CBC and differential; Future  -     Comprehensive metabolic panel; Future  -     Lipid panel; Future  -     TSH, 3rd generation with Free T4 reflex; Future    Macular degeneration of both eyes, unspecified type    Vitamin D deficiency    Family history of colon cancer         Patient Instructions   Lab data reviewed in detail and compared to prior    Family history of colon cancer-patient has no gastrointestinal symptoms, no history of colon polyps, normal CBC-I will discuss with GI regarding their recommendations for screening colonoscopy given advanced age  I am leaning against performing procedure  Consider yearly fit versus other  Hyperlipidemia-slight increase in LDL likely related to dietary indiscretion, continue on atorvastatin with dietary modification  Impaired fasting glucose stable with A1c 5 8  Glaucoma and macular degeneration following with Ophthalmology  Osteoporosis-continue with vitamin-D supplements  Patient not interested in bisphosphonates  Advanced directive reviewed and entered into chart  Routine follow-up after labs in 6 months, sooner as needed      Subjective:      Patient ID: Junie Inman is a 80 y o  female    Feeling generally well  Wants to discuss colonoscopy d/t sister h/o colon ca in 66's  No bowel issues  Last colo was 2005  HPL-tolerating atorvastatin  Admits to several mos of dietary indescretion r/t 90[address] bd and traveling over past few mos  Vision has been stable w/ MD f/u every 6 mos, glaucoma f/u q 4 mos  IFG-watching carbs  Exercising regularly w/ walks in the halls and stairs, less walks outside  Becoming less active lately  Osteoporosis- She continues to refuse bisphosphonates or other anti resorptive rx     No issues w/ balance, climbs stairs to second fl apt most times  Still cooking and cleaning for self, no driving x 10 yrs  [facility] w/ driving to apts             Current Outpatient Medications:     atorvastatin (LIPITOR) 20 mg tablet, Take 1 tablet (20 mg total) by mouth daily, Disp: 90 tablet, Rfl: 3    Cholecalciferol (CVS VITAMIN D) 2000 units CAPS, Take by mouth, Disp: , Rfl:     Multiple Vitamins-Minerals (CENTRUM SILVER 50+WOMEN) TABS, Take by mouth, Disp: , Rfl:     RHOPRESSA 0 02 % SOLN, instill 1 drop into both eyes at bedtime, Disp: , Rfl: 0    SIMBRINZA 1-0 2 % SUSP, , Disp: , Rfl: 0    VYZULTA 0 024 % SOLN, , Disp: , Rfl: 0    Recent Results (from the past 1008 hour(s))   CBC and differential    Collection Time: 07/10/19  9:24 AM   Result Value Ref Range    WBC 5 47 4 31 - 10 16 Thousand/uL    RBC 4 57 3 81 - 5 12 Million/uL    Hemoglobin 13 7 11 5 - 15 4 g/dL    Hematocrit 42 8 34 8 - 46 1 %    MCV 94 82 - 98 fL    MCH 30 0 26 8 - 34 3 pg    MCHC 32 0 31 4 - 37 4 g/dL    RDW 13 3 11 6 - 15 1 %    MPV 10 9 8 9 - 12 7 fL    Platelets 913 606 - 805 Thousands/uL    nRBC 0 /100 WBCs    Neutrophils Relative 54 43 - 75 %    Immat GRANS % 0 0 - 2 %    Lymphocytes Relative 29 14 - 44 %    Monocytes Relative 12 4 - 12 %    Eosinophils Relative 4 0 - 6 %    Basophils Relative 1 0 - 1 %    Neutrophils Absolute 2 93 1 85 - 7 62 Thousands/µL    Immature Grans Absolute 0 02 0 00 - 0 20 Thousand/uL    Lymphocytes Absolute 1 60 0 60 - 4 47 Thousands/µL    Monocytes Absolute 0 63 0 17 - 1 22 Thousand/µL    Eosinophils Absolute 0 23 0 00 - 0 61 Thousand/µL    Basophils Absolute 0 06 0 00 - 0 10 Thousands/µL   Comprehensive metabolic panel    Collection Time: 07/10/19  9:24 AM   Result Value Ref Range    Sodium 138 136 - 145 mmol/L    Potassium 4 1 3 5 - 5 3 mmol/L    Chloride 106 100 - 108 mmol/L    CO2 27 21 - 32 mmol/L    ANION GAP 5 4 - 13 mmol/L    BUN 17 5 - 25 mg/dL    Creatinine 0 83 0 60 - 1 30 mg/dL    Glucose, Fasting 106 (H) 65 - 99 mg/dL    Calcium 9 0 8 3 - 10 1 mg/dL    AST 41 5 - 45 U/L    ALT 63 12 - 78 U/L    Alkaline Phosphatase 95 46 - 116 U/L    Total Protein 7 5 6 4 - 8 2 g/dL    Albumin 3 3 (L) 3 5 - 5 0 g/dL    Total Bilirubin 0 42 0 20 - 1 00 mg/dL    eGFR 62 ml/min/1 73sq m   Lipid panel    Collection Time: 07/10/19  9:24 AM   Result Value Ref Range    Cholesterol 210 (H) 50 - 200 mg/dL    Triglycerides 140 <=150 mg/dL    HDL, Direct 45 40 - 60 mg/dL    LDL Calculated 137 (H) 0 - 100 mg/dL    Non-HDL-Chol (CHOL-HDL) 165 mg/dl   Hemoglobin A1C    Collection Time: 07/10/19  9:24 AM   Result Value Ref Range    Hemoglobin A1C 5 9 4 2 - 6 3 %     mg/dl   TSH, 3rd generation with Free T4 reflex    Collection Time: 07/10/19  9:24 AM   Result Value Ref Range    TSH 3RD GENERATON 2 020 0 358 - 3 740 uIU/mL       The following portions of the patient's history were reviewed and updated as appropriate: allergies, current medications, past family history, past medical history, past social history, past surgical history and problem list      Review of Systems   Constitutional: Negative for appetite change, chills, diaphoresis, fatigue, fever and unexpected weight change  HENT: Negative for congestion, hearing loss and rhinorrhea  Eyes: Positive for visual disturbance  Respiratory: Negative for cough, chest tightness, shortness of breath and wheezing  Cardiovascular: Negative for chest pain, palpitations and leg swelling  Gastrointestinal: Negative for abdominal pain and blood in stool  Endocrine: Negative for cold intolerance, heat intolerance, polydipsia and polyuria  Genitourinary: Negative for difficulty urinating, dysuria, frequency and urgency  Musculoskeletal: Negative for arthralgias and myalgias  Skin: Negative for rash  Neurological: Negative for dizziness, weakness, light-headedness and headaches  Hematological: Does not bruise/bleed easily  Psychiatric/Behavioral: Negative for dysphoric mood and sleep disturbance  Objective:      Vitals:    07/12/19 1046   BP: 114/70   Pulse: 65          Physical Exam   Constitutional: She is oriented to person, place, and time  She appears well-developed and well-nourished  HENT:   Head: Normocephalic and atraumatic  Nose: Nose normal    Mouth/Throat: Oropharynx is clear and moist    Eyes: Pupils are equal, round, and reactive to light  Conjunctivae and EOM are normal  No scleral icterus  Neck: Normal range of motion  Neck supple  No JVD present  No tracheal deviation present  No thyromegaly present  Cardiovascular: Normal rate, regular rhythm and intact distal pulses  Exam reveals no gallop and no friction rub  No murmur heard  Pulmonary/Chest: Effort normal and breath sounds normal  No respiratory distress  She has no wheezes  She has no rales  Abdominal: Soft  Bowel sounds are normal  She exhibits no distension and no mass  There is no tenderness  There is no rebound and no guarding  Musculoskeletal: She exhibits no edema, tenderness or deformity  Lymphadenopathy:     She has no cervical adenopathy  Neurological: She is alert and oriented to person, place, and time  No cranial nerve deficit  Skin: Skin is warm and dry  No rash noted  No erythema  No pallor  Psychiatric: She has a normal mood and affect   Her behavior is normal  Judgment and thought content normal

## 2019-07-12 NOTE — PATIENT INSTRUCTIONS
Lab data reviewed in detail and compared to prior    Family history of colon cancer-patient has no gastrointestinal symptoms, no history of colon polyps, normal CBC-I will discuss with GI regarding their recommendations for screening colonoscopy given advanced age  I am leaning against performing procedure  Consider yearly fit versus other  Hyperlipidemia-slight increase in LDL likely related to dietary indiscretion, continue on atorvastatin with dietary modification  Impaired fasting glucose stable with A1c 5 8  Glaucoma and macular degeneration following with Ophthalmology  Osteoporosis-continue with vitamin-D supplements  Patient not interested in bisphosphonates  Advanced directive reviewed and entered into chart      Routine follow-up after labs in 6 months, sooner as needed

## 2019-07-15 ENCOUNTER — TELEPHONE (OUTPATIENT)
Dept: INTERNAL MEDICINE CLINIC | Facility: CLINIC | Age: 84
End: 2019-07-15

## 2019-07-15 NOTE — TELEPHONE ENCOUNTER
----- Message from Oneil Frankel, MD sent at 7/15/2019  1:17 PM EDT -----  Notify patient that I d/w Dr Brooklynn Forte and he recommends starting w/ fecal occult blood test

## 2019-10-28 ENCOUNTER — OFFICE VISIT (OUTPATIENT)
Dept: INTERNAL MEDICINE CLINIC | Facility: CLINIC | Age: 84
End: 2019-10-28
Payer: COMMERCIAL

## 2019-10-28 ENCOUNTER — APPOINTMENT (OUTPATIENT)
Dept: LAB | Facility: CLINIC | Age: 84
End: 2019-10-28
Payer: COMMERCIAL

## 2019-10-28 VITALS
BODY MASS INDEX: 25.68 KG/M2 | WEIGHT: 127.4 LBS | HEIGHT: 59 IN | TEMPERATURE: 97.6 F | HEART RATE: 78 BPM | SYSTOLIC BLOOD PRESSURE: 126 MMHG | RESPIRATION RATE: 14 BRPM | DIASTOLIC BLOOD PRESSURE: 76 MMHG

## 2019-10-28 DIAGNOSIS — E53.8 VITAMIN B 12 DEFICIENCY: ICD-10-CM

## 2019-10-28 DIAGNOSIS — R53.83 OTHER FATIGUE: Primary | ICD-10-CM

## 2019-10-28 DIAGNOSIS — R53.83 OTHER FATIGUE: ICD-10-CM

## 2019-10-28 LAB
ANION GAP SERPL CALCULATED.3IONS-SCNC: 4 MMOL/L (ref 4–13)
BASOPHILS # BLD AUTO: 0.07 THOUSANDS/ΜL (ref 0–0.1)
BASOPHILS NFR BLD AUTO: 1 % (ref 0–1)
BILIRUB UR QL STRIP: NEGATIVE
BUN SERPL-MCNC: 21 MG/DL (ref 5–25)
CALCIUM SERPL-MCNC: 9.6 MG/DL (ref 8.3–10.1)
CHLORIDE SERPL-SCNC: 110 MMOL/L (ref 100–108)
CLARITY UR: NORMAL
CO2 SERPL-SCNC: 28 MMOL/L (ref 21–32)
COLOR UR: YELLOW
CREAT SERPL-MCNC: 0.83 MG/DL (ref 0.6–1.3)
EOSINOPHIL # BLD AUTO: 0.38 THOUSAND/ΜL (ref 0–0.61)
EOSINOPHIL NFR BLD AUTO: 6 % (ref 0–6)
ERYTHROCYTE [DISTWIDTH] IN BLOOD BY AUTOMATED COUNT: 13.6 % (ref 11.6–15.1)
FOLATE SERPL-MCNC: 19.4 NG/ML (ref 3.1–17.5)
GFR SERPL CREATININE-BSD FRML MDRD: 62 ML/MIN/1.73SQ M
GLUCOSE SERPL-MCNC: 106 MG/DL (ref 65–140)
GLUCOSE UR STRIP-MCNC: NEGATIVE MG/DL
HCT VFR BLD AUTO: 43.8 % (ref 34.8–46.1)
HGB BLD-MCNC: 13.9 G/DL (ref 11.5–15.4)
HGB UR QL STRIP.AUTO: NEGATIVE
IMM GRANULOCYTES # BLD AUTO: 0.01 THOUSAND/UL (ref 0–0.2)
IMM GRANULOCYTES NFR BLD AUTO: 0 % (ref 0–2)
KETONES UR STRIP-MCNC: NEGATIVE MG/DL
LEUKOCYTE ESTERASE UR QL STRIP: NEGATIVE
LYMPHOCYTES # BLD AUTO: 2.38 THOUSANDS/ΜL (ref 0.6–4.47)
LYMPHOCYTES NFR BLD AUTO: 36 % (ref 14–44)
MAGNESIUM SERPL-MCNC: 2.5 MG/DL (ref 1.6–2.6)
MCH RBC QN AUTO: 29.9 PG (ref 26.8–34.3)
MCHC RBC AUTO-ENTMCNC: 31.7 G/DL (ref 31.4–37.4)
MCV RBC AUTO: 94 FL (ref 82–98)
MONOCYTES # BLD AUTO: 0.81 THOUSAND/ΜL (ref 0.17–1.22)
MONOCYTES NFR BLD AUTO: 12 % (ref 4–12)
NEUTROPHILS # BLD AUTO: 3 THOUSANDS/ΜL (ref 1.85–7.62)
NEUTS SEG NFR BLD AUTO: 45 % (ref 43–75)
NITRITE UR QL STRIP: NEGATIVE
NRBC BLD AUTO-RTO: 0 /100 WBCS
PH UR STRIP.AUTO: 7.5 [PH]
PLATELET # BLD AUTO: 247 THOUSANDS/UL (ref 149–390)
PMV BLD AUTO: 10.5 FL (ref 8.9–12.7)
POTASSIUM SERPL-SCNC: 4.1 MMOL/L (ref 3.5–5.3)
PROT UR STRIP-MCNC: NEGATIVE MG/DL
RBC # BLD AUTO: 4.65 MILLION/UL (ref 3.81–5.12)
SODIUM SERPL-SCNC: 142 MMOL/L (ref 136–145)
SP GR UR STRIP.AUTO: 1.02 (ref 1–1.03)
TSH SERPL DL<=0.05 MIU/L-ACNC: 1.7 UIU/ML (ref 0.36–3.74)
UROBILINOGEN UR QL STRIP.AUTO: 1 E.U./DL
VIT B12 SERPL-MCNC: 395 PG/ML (ref 100–900)
WBC # BLD AUTO: 6.65 THOUSAND/UL (ref 4.31–10.16)

## 2019-10-28 PROCEDURE — 36415 COLL VENOUS BLD VENIPUNCTURE: CPT

## 2019-10-28 PROCEDURE — 82746 ASSAY OF FOLIC ACID SERUM: CPT

## 2019-10-28 PROCEDURE — 80048 BASIC METABOLIC PNL TOTAL CA: CPT

## 2019-10-28 PROCEDURE — 99213 OFFICE O/P EST LOW 20 MIN: CPT | Performed by: NURSE PRACTITIONER

## 2019-10-28 PROCEDURE — 81003 URINALYSIS AUTO W/O SCOPE: CPT | Performed by: NURSE PRACTITIONER

## 2019-10-28 PROCEDURE — 86618 LYME DISEASE ANTIBODY: CPT

## 2019-10-28 PROCEDURE — 83735 ASSAY OF MAGNESIUM: CPT

## 2019-10-28 PROCEDURE — 85025 COMPLETE CBC W/AUTO DIFF WBC: CPT

## 2019-10-28 PROCEDURE — 82607 VITAMIN B-12: CPT

## 2019-10-28 PROCEDURE — 83918 ORGANIC ACIDS TOTAL QUANT: CPT

## 2019-10-28 PROCEDURE — 1101F PT FALLS ASSESS-DOCD LE1/YR: CPT | Performed by: NURSE PRACTITIONER

## 2019-10-28 PROCEDURE — 84443 ASSAY THYROID STIM HORMONE: CPT

## 2019-10-28 PROCEDURE — 87086 URINE CULTURE/COLONY COUNT: CPT

## 2019-10-28 NOTE — PATIENT INSTRUCTIONS
Vague symptoms of fatigue no other specific complaints in a 19-year-old female    We will check labs to rule out infection versus other and monitor symptoms closely

## 2019-10-28 NOTE — PROGRESS NOTES
Assessment/Plan:    Patient Instructions    Vague symptoms of fatigue no other specific complaints in a 72-year-old female  We will check labs to rule out infection versus other and monitor symptoms closely         Diagnoses and all orders for this visit:    Other fatigue  -     CBC and differential; Future  -     Basic metabolic panel; Future  -     Urinalysis with reflex to microscopic  -     Urine culture; Future  -     TSH, 3rd generation with Free T4 reflex; Future  -     Folate; Future  -     Magnesium; Future  -     Lyme Antibody Profile with reflex to WB; Future    Vitamin B 12 deficiency  -     Vitamin B12; Future  -     Methylmalonic acid, serum; Future  -     Folate; Future    Other orders  -     Cancel: DXA bone density spine hip and pelvis; Future         Subjective:      Patient ID: Melanie Zimmerman is a 80 y o  female     Patient has vague symptoms of just feeling tired for the last 1 week  She states just not like her  She just has no energy  She has no other specific complaints  No chest pain or shortness of breath no lightheadedness dizziness or headaches no burning with urination or frequency  No change in medication  Current Outpatient Medications:     atorvastatin (LIPITOR) 20 mg tablet, Take 1 tablet (20 mg total) by mouth daily, Disp: 90 tablet, Rfl: 3    Cholecalciferol (CVS VITAMIN D) 2000 units CAPS, Take by mouth, Disp: , Rfl:     Multiple Vitamins-Minerals (CENTRUM SILVER 50+WOMEN) TABS, Take by mouth, Disp: , Rfl:     RHOPRESSA 0 02 % SOLN, instill 1 drop into both eyes at bedtime, Disp: , Rfl: 0    SIMBRINZA 1-0 2 % SUSP, , Disp: , Rfl: 0    VYZULTA 0 024 % SOLN, , Disp: , Rfl: 0    No results found for this or any previous visit (from the past 1008 hour(s))      The following portions of the patient's history were reviewed and updated as appropriate: allergies, current medications, past family history, past medical history, past social history, past surgical history and problem list      Review of Systems   Constitutional: Negative for appetite change, chills, diaphoresis, fatigue, fever and unexpected weight change  HENT: Negative for postnasal drip and sneezing  Eyes: Negative for visual disturbance  Respiratory: Negative for chest tightness and shortness of breath  Cardiovascular: Negative for chest pain, palpitations and leg swelling  Gastrointestinal: Negative for abdominal pain and blood in stool  Endocrine: Negative for cold intolerance, heat intolerance, polydipsia, polyphagia and polyuria  Genitourinary: Negative for difficulty urinating, dysuria, frequency and urgency  Musculoskeletal: Negative for arthralgias and myalgias  Skin: Negative for rash and wound  Neurological: Negative for dizziness, weakness, light-headedness and headaches  Hematological: Negative for adenopathy  Psychiatric/Behavioral: Negative for confusion, dysphoric mood and sleep disturbance  The patient is not nervous/anxious  Objective:      /76 (BP Location: Left arm, Patient Position: Sitting, Cuff Size: Standard)   Pulse 78   Temp 97 6 °F (36 4 °C) (Tympanic)   Resp 14   Ht 4' 11" (1 499 m)   Wt 57 8 kg (127 lb 6 4 oz)   BMI 25 73 kg/m²        Physical Exam   Constitutional: She is oriented to person, place, and time  She appears well-developed  No distress  HENT:   Head: Normocephalic and atraumatic  Nose: Nose normal    Mouth/Throat: Oropharynx is clear and moist    Eyes: Pupils are equal, round, and reactive to light  Conjunctivae and EOM are normal    Neck: Normal range of motion  Neck supple  No JVD present  No tracheal deviation present  No thyromegaly present  Cardiovascular: Normal rate, regular rhythm, normal heart sounds and intact distal pulses  Exam reveals no gallop and no friction rub  No murmur heard  Pulmonary/Chest: Effort normal and breath sounds normal  No respiratory distress  She has no wheezes  She has no rales  Abdominal: Soft  Bowel sounds are normal  She exhibits no distension  There is no tenderness  Musculoskeletal: Normal range of motion  She exhibits no edema  Lymphadenopathy:     She has no cervical adenopathy  Neurological: She is alert and oriented to person, place, and time  No cranial nerve deficit  Skin: Skin is warm and dry  No rash noted  She is not diaphoretic  Psychiatric: She has a normal mood and affect  Her behavior is normal  Judgment and thought content normal    BMI Counseling: Body mass index is 25 73 kg/m²  The BMI is above normal  Nutrition recommendations include decreasing portion sizes and consuming healthier snacks  Exercise recommendations include exercising 3-5 times per week  No pharmacotherapy was ordered

## 2019-10-29 ENCOUNTER — TELEPHONE (OUTPATIENT)
Dept: INTERNAL MEDICINE CLINIC | Facility: CLINIC | Age: 84
End: 2019-10-29

## 2019-10-29 LAB — BACTERIA UR CULT: NORMAL

## 2019-10-29 NOTE — TELEPHONE ENCOUNTER
Pt called wants to know the results to her labs that was ordered by Shira Daniels that was completed yesterday       Please call the patient  [phone]

## 2019-10-30 ENCOUNTER — TELEPHONE (OUTPATIENT)
Dept: INTERNAL MEDICINE CLINIC | Facility: CLINIC | Age: 84
End: 2019-10-30

## 2019-10-30 LAB — B BURGDOR IGG+IGM SER-ACNC: <0.91 ISR (ref 0–0.9)

## 2019-10-30 NOTE — TELEPHONE ENCOUNTER
[place] was [place] did some tests found nothing  Pt is requesting a lymes test   Pt still is tired and can't function     Call pt at [phone]

## 2019-10-31 LAB
METHYLMALONATE SERPL-SCNC: 256 NMOL/L (ref 0–378)
SL AMB DISCLAIMER: NORMAL

## 2019-11-01 ENCOUNTER — OFFICE VISIT (OUTPATIENT)
Dept: INTERNAL MEDICINE CLINIC | Facility: CLINIC | Age: 84
End: 2019-11-01
Payer: COMMERCIAL

## 2019-11-01 ENCOUNTER — APPOINTMENT (OUTPATIENT)
Dept: LAB | Facility: CLINIC | Age: 84
End: 2019-11-01
Payer: COMMERCIAL

## 2019-11-01 VITALS
BODY MASS INDEX: 25.4 KG/M2 | HEART RATE: 70 BPM | WEIGHT: 126 LBS | SYSTOLIC BLOOD PRESSURE: 126 MMHG | TEMPERATURE: 97.6 F | RESPIRATION RATE: 14 BRPM | HEIGHT: 59 IN | DIASTOLIC BLOOD PRESSURE: 84 MMHG

## 2019-11-01 DIAGNOSIS — M79.10 MYALGIA: ICD-10-CM

## 2019-11-01 DIAGNOSIS — E55.9 VITAMIN D DEFICIENCY: ICD-10-CM

## 2019-11-01 DIAGNOSIS — M79.10 MYALGIA: Primary | ICD-10-CM

## 2019-11-01 DIAGNOSIS — R53.83 FATIGUE, UNSPECIFIED TYPE: ICD-10-CM

## 2019-11-01 LAB
25(OH)D3 SERPL-MCNC: 42 NG/ML (ref 30–100)
CRP SERPL QL: <3 MG/L
ERYTHROCYTE [SEDIMENTATION RATE] IN BLOOD: 32 MM/HOUR (ref 0–20)

## 2019-11-01 PROCEDURE — 36415 COLL VENOUS BLD VENIPUNCTURE: CPT

## 2019-11-01 PROCEDURE — 85652 RBC SED RATE AUTOMATED: CPT

## 2019-11-01 PROCEDURE — 99213 OFFICE O/P EST LOW 20 MIN: CPT | Performed by: NURSE PRACTITIONER

## 2019-11-01 PROCEDURE — 86140 C-REACTIVE PROTEIN: CPT

## 2019-11-01 PROCEDURE — 82306 VITAMIN D 25 HYDROXY: CPT

## 2019-11-01 NOTE — PROGRESS NOTES
INTERNAL MEDICINE FOLLOW-UP OFFICE VISIT  [facility] [address] [place] [address]    NAME: Joshua Smalls  AGE: 80 y o  SEX: female    DATE OF ENCOUNTER: 11/1/2019   Assessment and Plan:     Problem List Items Addressed This Visit        Other    Vitamin D deficiency    Relevant Orders    Vitamin D 25 hydroxy    Myalgia - Primary    Relevant Orders    C-reactive protein    Sedimentation rate, automated    Vitamin D 25 hydroxy      Other Visit Diagnoses     Fatigue, unspecified type          Ongoing fatigue  Blood work performed this week was all negative  Will order sed rate, CRP, and vitamin-D level  No follow-ups on file  Counseling:     · Medication Side Effects - Adverse side effects of medications were reviewed with the patient/guardian today: Yes  · Counseling was given regarding: Intructions for management  · Barriers to treatment include: No identified barriers      Chief Complaint:     Chief Complaint   Patient presents with    Follow-up     lightheaded not constantly, exhausted  History of Present Illness:     ARYAN     Kim Zuniga presents to the office today in follow-up  She had seen a provider in our office the beginning of this week for complaints of lightheadedness and exhaustion  The patient denies recently being sick  Blood work had been ordered for that patient and was all negative  Patient returns today because she is still not feeling better  Patient states she feels like she wants to sleep all the time  Her physical exam was negative  A mini-mental exam was performed which was also negative  I did have a discussion with Dr Serge Rausch regarding the management of this patient  Additional blood work with a sed rate, CRP, and vitamin-D level were ordered  I did make the patient and daughter aware that if this exhaustion and fatigue continues to worsen over the weekend, that it may warrant an emergency room evaluation      The following portions of the patient's history were reviewed and updated as appropriate: allergies, current medications, past family history, past medical history, past social history, past surgical history and problem list      Review of Systems:     Review of Systems     Problem List:     Patient Active Problem List   Diagnosis    Esophageal reflux    Hyperlipidemia    Hypertension    Impaired fasting glucose    Macular degeneration    Osteoporosis    Vitamin D deficiency    Family history of colon cancer        Objective:     /84 (BP Location: Left arm, Patient Position: Sitting, Cuff Size: Standard)   Pulse 70   Temp 97 6 °F (36 4 °C) (Oral)   Resp 14   Ht 4' 11" (1 499 m)   Wt 57 2 kg (126 lb)   BMI 25 45 kg/m²     Physical Exam    Pertinent Laboratory/Diagnostic Studies:    Laboratory Results: I have personally reviewed the pertinent laboratory results/reports   Radiology/Other Diagnostic Testing Results: I have personally reviewed pertinent reports  Current Medications:     Current Outpatient Medications   Medication Sig Dispense Refill    atorvastatin (LIPITOR) 20 mg tablet Take 1 tablet (20 mg total) by mouth daily 90 tablet 3    Cholecalciferol ([employer]S VITAMIN D) 2000 units CAPS Take by mouth      Multiple Vitamins-Minerals (CENTRUM SILVER 50+WOMEN) TABS Take by mouth      RHOPRESSA 0 02 % SOLN instill 1 drop into both eyes at bedtime  0    SIMBRINZA 1-0 2 % SUSP   0    VYZULTA 0 024 % SOLN   0     No current facility-administered medications for this visit  There are no Patient Instructions on file for this visit      AMNA Castillo  [facility]

## 2019-11-04 ENCOUNTER — TELEPHONE (OUTPATIENT)
Dept: INTERNAL MEDICINE CLINIC | Facility: CLINIC | Age: 84
End: 2019-11-04

## 2020-02-10 ENCOUNTER — TELEPHONE (OUTPATIENT)
Dept: INTERNAL MEDICINE CLINIC | Facility: CLINIC | Age: 85
End: 2020-02-10

## 2020-02-10 NOTE — TELEPHONE ENCOUNTER
Called patient to advise to get labs done for appointment on Thursday, she stated she didn't think she needed labs because she had them done  Last set of labs was 10/28/2019, there are pending orders in chart  If she does need the pending orders done she wants to know if she can get them done after her appointment since she would have to set up an appointment for a ride and has to give them a week notice  Stated you could call her after or do you want her to reschedule   Please advise

## 2020-02-13 ENCOUNTER — APPOINTMENT (OUTPATIENT)
Dept: LAB | Facility: CLINIC | Age: 85
End: 2020-02-13
Payer: COMMERCIAL

## 2020-02-13 ENCOUNTER — OFFICE VISIT (OUTPATIENT)
Dept: INTERNAL MEDICINE CLINIC | Facility: CLINIC | Age: 85
End: 2020-02-13
Payer: COMMERCIAL

## 2020-02-13 VITALS
SYSTOLIC BLOOD PRESSURE: 120 MMHG | WEIGHT: 128 LBS | RESPIRATION RATE: 12 BRPM | DIASTOLIC BLOOD PRESSURE: 62 MMHG | HEIGHT: 58 IN | HEART RATE: 68 BPM | BODY MASS INDEX: 26.87 KG/M2

## 2020-02-13 DIAGNOSIS — R73.01 IMPAIRED FASTING GLUCOSE: Primary | ICD-10-CM

## 2020-02-13 DIAGNOSIS — E78.2 MIXED HYPERLIPIDEMIA: ICD-10-CM

## 2020-02-13 DIAGNOSIS — L98.9 BENIGN SKIN LESION: ICD-10-CM

## 2020-02-13 DIAGNOSIS — H35.30 MACULAR DEGENERATION OF BOTH EYES, UNSPECIFIED TYPE: ICD-10-CM

## 2020-02-13 DIAGNOSIS — R73.01 IMPAIRED FASTING GLUCOSE: ICD-10-CM

## 2020-02-13 DIAGNOSIS — M81.0 AGE-RELATED OSTEOPOROSIS WITHOUT CURRENT PATHOLOGICAL FRACTURE: ICD-10-CM

## 2020-02-13 DIAGNOSIS — Z80.0 FAMILY HISTORY OF COLON CANCER: ICD-10-CM

## 2020-02-13 LAB
ALBUMIN SERPL BCP-MCNC: 3.5 G/DL (ref 3.5–5)
ALP SERPL-CCNC: 85 U/L (ref 46–116)
ALT SERPL W P-5'-P-CCNC: 27 U/L (ref 12–78)
ANION GAP SERPL CALCULATED.3IONS-SCNC: 3 MMOL/L (ref 4–13)
AST SERPL W P-5'-P-CCNC: 20 U/L (ref 5–45)
BASOPHILS # BLD AUTO: 0.05 THOUSANDS/ΜL (ref 0–0.1)
BASOPHILS NFR BLD AUTO: 1 % (ref 0–1)
BILIRUB SERPL-MCNC: 0.42 MG/DL (ref 0.2–1)
BUN SERPL-MCNC: 18 MG/DL (ref 5–25)
CALCIUM SERPL-MCNC: 9.3 MG/DL (ref 8.3–10.1)
CHLORIDE SERPL-SCNC: 110 MMOL/L (ref 100–108)
CHOLEST SERPL-MCNC: 219 MG/DL (ref 50–200)
CO2 SERPL-SCNC: 29 MMOL/L (ref 21–32)
CREAT SERPL-MCNC: 0.91 MG/DL (ref 0.6–1.3)
EOSINOPHIL # BLD AUTO: 0.28 THOUSAND/ΜL (ref 0–0.61)
EOSINOPHIL NFR BLD AUTO: 5 % (ref 0–6)
ERYTHROCYTE [DISTWIDTH] IN BLOOD BY AUTOMATED COUNT: 13.2 % (ref 11.6–15.1)
EST. AVERAGE GLUCOSE BLD GHB EST-MCNC: 123 MG/DL
GFR SERPL CREATININE-BSD FRML MDRD: 56 ML/MIN/1.73SQ M
GLUCOSE SERPL-MCNC: 132 MG/DL (ref 65–140)
HBA1C MFR BLD: 5.9 %
HCT VFR BLD AUTO: 43.8 % (ref 34.8–46.1)
HDLC SERPL-MCNC: 55 MG/DL
HGB BLD-MCNC: 14.2 G/DL (ref 11.5–15.4)
IMM GRANULOCYTES # BLD AUTO: 0.02 THOUSAND/UL (ref 0–0.2)
IMM GRANULOCYTES NFR BLD AUTO: 0 % (ref 0–2)
LDLC SERPL CALC-MCNC: 124 MG/DL (ref 0–100)
LYMPHOCYTES # BLD AUTO: 2.44 THOUSANDS/ΜL (ref 0.6–4.47)
LYMPHOCYTES NFR BLD AUTO: 39 % (ref 14–44)
MCH RBC QN AUTO: 30.3 PG (ref 26.8–34.3)
MCHC RBC AUTO-ENTMCNC: 32.4 G/DL (ref 31.4–37.4)
MCV RBC AUTO: 94 FL (ref 82–98)
MONOCYTES # BLD AUTO: 0.59 THOUSAND/ΜL (ref 0.17–1.22)
MONOCYTES NFR BLD AUTO: 10 % (ref 4–12)
NEUTROPHILS # BLD AUTO: 2.84 THOUSANDS/ΜL (ref 1.85–7.62)
NEUTS SEG NFR BLD AUTO: 45 % (ref 43–75)
NONHDLC SERPL-MCNC: 164 MG/DL
NRBC BLD AUTO-RTO: 0 /100 WBCS
PLATELET # BLD AUTO: 245 THOUSANDS/UL (ref 149–390)
PMV BLD AUTO: 10.9 FL (ref 8.9–12.7)
POTASSIUM SERPL-SCNC: 3.7 MMOL/L (ref 3.5–5.3)
PROT SERPL-MCNC: 7.7 G/DL (ref 6.4–8.2)
RBC # BLD AUTO: 4.68 MILLION/UL (ref 3.81–5.12)
SODIUM SERPL-SCNC: 142 MMOL/L (ref 136–145)
TRIGL SERPL-MCNC: 200 MG/DL
TSH SERPL DL<=0.05 MIU/L-ACNC: 1.81 UIU/ML (ref 0.36–3.74)
WBC # BLD AUTO: 6.22 THOUSAND/UL (ref 4.31–10.16)

## 2020-02-13 PROCEDURE — 1160F RVW MEDS BY RX/DR IN RCRD: CPT | Performed by: INTERNAL MEDICINE

## 2020-02-13 PROCEDURE — 83036 HEMOGLOBIN GLYCOSYLATED A1C: CPT

## 2020-02-13 PROCEDURE — 80053 COMPREHEN METABOLIC PANEL: CPT

## 2020-02-13 PROCEDURE — 36415 COLL VENOUS BLD VENIPUNCTURE: CPT

## 2020-02-13 PROCEDURE — 85025 COMPLETE CBC W/AUTO DIFF WBC: CPT

## 2020-02-13 PROCEDURE — 80061 LIPID PANEL: CPT

## 2020-02-13 PROCEDURE — 4040F PNEUMOC VAC/ADMIN/RCVD: CPT | Performed by: INTERNAL MEDICINE

## 2020-02-13 PROCEDURE — 99214 OFFICE O/P EST MOD 30 MIN: CPT | Performed by: INTERNAL MEDICINE

## 2020-02-13 PROCEDURE — 3078F DIAST BP <80 MM HG: CPT | Performed by: INTERNAL MEDICINE

## 2020-02-13 PROCEDURE — 84443 ASSAY THYROID STIM HORMONE: CPT

## 2020-02-13 PROCEDURE — 3008F BODY MASS INDEX DOCD: CPT | Performed by: INTERNAL MEDICINE

## 2020-02-13 PROCEDURE — 1036F TOBACCO NON-USER: CPT | Performed by: INTERNAL MEDICINE

## 2020-02-13 PROCEDURE — 3074F SYST BP LT 130 MM HG: CPT | Performed by: INTERNAL MEDICINE

## 2020-02-13 RX ORDER — ACETAMINOPHEN 500 MG
500 TABLET ORAL AS NEEDED
COMMUNITY
End: 2021-02-09 | Stop reason: HOSPADM

## 2020-02-13 NOTE — PATIENT INSTRUCTIONS
Check labs that were ordered last July and will follow accordingly  Hyperlipidemia-continue atorvastatin and follow-up lipid profile    6 mm skin lesion on upper back appears unchanged from description 1 year ago  Patient was seen by dermatology  Will continue to monitor lesion    Macular degeneration and glaucoma causing blindness following with Ophthalmology  Right knee pain-normal range of motion, no effusion or ligamentous laxity  I recommend Tylenol arthritis 2-3 times daily ice and elevate  Contact me if symptoms worsen or fail to improve  Osteoporosis-patient not interested in treatment    Family history colon cancer-check yearly fit    Routine follow-up after labs in 6 months, sooner as needed

## 2020-02-13 NOTE — PROGRESS NOTES
Assessment/Plan:    Diagnoses and all orders for this visit:    Impaired fasting glucose  -     Hemoglobin A1C; Future    Mixed hyperlipidemia  -     CBC and differential; Future  -     Comprehensive metabolic panel; Future  -     Lipid panel; Future    Age-related osteoporosis without current pathological fracture    Macular degeneration of both eyes, unspecified type    Family history of colon cancer    Benign skin lesion    Other orders  -     acetaminophen (TYLENOL) 500 mg tablet; Take 500 mg by mouth as needed for mild pain         Patient Instructions   Check labs that were ordered last July and will follow accordingly  Hyperlipidemia-continue atorvastatin and follow-up lipid profile    6 mm skin lesion on upper back appears unchanged from description 1 year ago  Patient was seen by dermatology  Will continue to monitor lesion    Macular degeneration and glaucoma causing blindness following with Ophthalmology  Right knee pain-normal range of motion, no effusion or ligamentous laxity  I recommend Tylenol arthritis 2-3 times daily ice and elevate  Contact me if symptoms worsen or fail to improve  Osteoporosis-patient not interested in treatment    Family history colon cancer-check yearly fit    Routine follow-up after labs in 6 months, sooner as needed  Subjective:      Patient ID: Miriam Velazco is a 80 y o  female    F/u mmp  She didn't do labs  Feeling generally well  Recovered from ? illness in Nov m/b profound weakness and fatigue w/ negative w/u  HPL-tolerating atorvastatin  Vision has been stable w/ MD f/u every 6 mos, glaucoma f/u q 4 mos  IFG-watching carbs  Exercising regularly w/ walks in the halls and stairs, less walks outside  Keeping very active  Notes some arthitic pain in r knee x 3 days after a long day on her feet  No twisting or falls  Osteoporosis- She continues to refuse bisphosphonates or other anti resorptive rx     No issues w/ balance, climbs stairs to second fl apt most times  Still cooking and cleaning for self, no driving x 10 yrs  [facility] w/ driving to apts  Current Outpatient Medications:     acetaminophen (TYLENOL) 500 mg tablet, Take 500 mg by mouth as needed for mild pain, Disp: , Rfl:     atorvastatin (LIPITOR) 20 mg tablet, Take 1 tablet (20 mg total) by mouth daily, Disp: 90 tablet, Rfl: 3    Cholecalciferol (CVS VITAMIN D) 2000 units CAPS, Take by mouth, Disp: , Rfl:     Multiple Vitamins-Minerals (CENTRUM SILVER 50+WOMEN) TABS, Take by mouth, Disp: , Rfl:     RHOPRESSA 0 02 % SOLN, instill 1 drop into both eyes at bedtime, Disp: , Rfl: 0    SIMBRINZA 1-0 2 % SUSP, , Disp: , Rfl: 0    VYZULTA 0 024 % SOLN, , Disp: , Rfl: 0    No results found for this or any previous visit (from the past 1008 hour(s))  The following portions of the patient's history were reviewed and updated as appropriate: allergies, current medications, past family history, past medical history, past social history, past surgical history and problem list      Review of Systems   Constitutional: Negative for appetite change, chills, diaphoresis, fatigue, fever and unexpected weight change  HENT: Negative for congestion, hearing loss and rhinorrhea  Eyes: Negative for visual disturbance  Respiratory: Negative for cough, chest tightness, shortness of breath and wheezing  Cardiovascular: Negative for chest pain, palpitations and leg swelling  Gastrointestinal: Negative for abdominal pain and blood in stool  Endocrine: Negative for cold intolerance, heat intolerance, polydipsia and polyuria  Genitourinary: Negative for difficulty urinating, dysuria, frequency and urgency  Musculoskeletal: Positive for arthralgias  Negative for myalgias  Skin: Negative for rash  Neurological: Negative for dizziness, weakness, light-headedness and headaches  Hematological: Does not bruise/bleed easily     Psychiatric/Behavioral: Negative for dysphoric mood and sleep disturbance  Objective:      Vitals:    02/13/20 1255   BP: 120/62   Pulse: 68   Resp: 12          Physical Exam   Constitutional: She is oriented to person, place, and time  She appears well-developed and well-nourished  HENT:   Head: Normocephalic and atraumatic  Nose: Nose normal    Mouth/Throat: Oropharynx is clear and moist    Eyes: Pupils are equal, round, and reactive to light  Conjunctivae and EOM are normal  No scleral icterus  Neck: Normal range of motion  Neck supple  No JVD present  No tracheal deviation present  No thyromegaly present  Cardiovascular: Normal rate, regular rhythm and intact distal pulses  Exam reveals no gallop and no friction rub  No murmur heard  Pulmonary/Chest: Effort normal and breath sounds normal  No respiratory distress  She has no wheezes  She has no rales  Musculoskeletal: She exhibits no edema or deformity  Lymphadenopathy:     She has no cervical adenopathy  Neurological: She is alert and oriented to person, place, and time  No cranial nerve deficit  Skin: Skin is warm and dry  No rash noted  No erythema  No pallor  Approximately 6 mm pearly papular lesion in upper right side mid back   Psychiatric: She has a normal mood and affect   Her behavior is normal  Judgment and thought content normal

## 2020-02-14 ENCOUNTER — TELEPHONE (OUTPATIENT)
Dept: INTERNAL MEDICINE CLINIC | Facility: CLINIC | Age: 85
End: 2020-02-14

## 2020-03-18 DIAGNOSIS — E78.5 HYPERLIPIDEMIA, UNSPECIFIED HYPERLIPIDEMIA TYPE: ICD-10-CM

## 2020-03-18 RX ORDER — ATORVASTATIN CALCIUM 20 MG/1
20 TABLET, FILM COATED ORAL DAILY
Qty: 90 TABLET | Refills: 3 | Status: SHIPPED | OUTPATIENT
Start: 2020-03-18 | End: 2020-06-30

## 2020-06-29 DIAGNOSIS — E78.5 HYPERLIPIDEMIA, UNSPECIFIED HYPERLIPIDEMIA TYPE: ICD-10-CM

## 2020-06-30 RX ORDER — ATORVASTATIN CALCIUM 20 MG/1
20 TABLET, FILM COATED ORAL DAILY
Qty: 90 TABLET | Refills: 3 | Status: SHIPPED | OUTPATIENT
Start: 2020-06-30 | End: 2021-01-26 | Stop reason: SDUPTHER

## 2020-07-09 RX ORDER — NETARSUDIL 0.2 MG/ML
SOLUTION/ DROPS OPHTHALMIC; TOPICAL
Qty: 2.5 ML | OUTPATIENT
Start: 2020-07-09

## 2020-08-25 ENCOUNTER — APPOINTMENT (OUTPATIENT)
Dept: LAB | Facility: CLINIC | Age: 85
End: 2020-08-25
Payer: COMMERCIAL

## 2020-08-25 LAB
ALBUMIN SERPL BCP-MCNC: 3.7 G/DL (ref 3.5–5)
ALP SERPL-CCNC: 75 U/L (ref 46–116)
ALT SERPL W P-5'-P-CCNC: 50 U/L (ref 12–78)
ANION GAP SERPL CALCULATED.3IONS-SCNC: 4 MMOL/L (ref 4–13)
AST SERPL W P-5'-P-CCNC: 35 U/L (ref 5–45)
BASOPHILS # BLD AUTO: 0.07 THOUSANDS/ΜL (ref 0–0.1)
BASOPHILS NFR BLD AUTO: 1 % (ref 0–1)
BILIRUB SERPL-MCNC: 0.46 MG/DL (ref 0.2–1)
BUN SERPL-MCNC: 20 MG/DL (ref 5–25)
CALCIUM SERPL-MCNC: 8.9 MG/DL (ref 8.3–10.1)
CHLORIDE SERPL-SCNC: 109 MMOL/L (ref 100–108)
CHOLEST SERPL-MCNC: 177 MG/DL (ref 50–200)
CO2 SERPL-SCNC: 27 MMOL/L (ref 21–32)
CREAT SERPL-MCNC: 0.8 MG/DL (ref 0.6–1.3)
EOSINOPHIL # BLD AUTO: 0.34 THOUSAND/ΜL (ref 0–0.61)
EOSINOPHIL NFR BLD AUTO: 6 % (ref 0–6)
ERYTHROCYTE [DISTWIDTH] IN BLOOD BY AUTOMATED COUNT: 13.8 % (ref 11.6–15.1)
EST. AVERAGE GLUCOSE BLD GHB EST-MCNC: 120 MG/DL
GFR SERPL CREATININE-BSD FRML MDRD: 65 ML/MIN/1.73SQ M
GLUCOSE P FAST SERPL-MCNC: 114 MG/DL (ref 65–99)
HBA1C MFR BLD: 5.8 %
HCT VFR BLD AUTO: 43.7 % (ref 34.8–46.1)
HDLC SERPL-MCNC: 48 MG/DL
HGB BLD-MCNC: 14.1 G/DL (ref 11.5–15.4)
IMM GRANULOCYTES # BLD AUTO: 0.02 THOUSAND/UL (ref 0–0.2)
IMM GRANULOCYTES NFR BLD AUTO: 0 % (ref 0–2)
LDLC SERPL CALC-MCNC: 109 MG/DL (ref 0–100)
LYMPHOCYTES # BLD AUTO: 2.14 THOUSANDS/ΜL (ref 0.6–4.47)
LYMPHOCYTES NFR BLD AUTO: 34 % (ref 14–44)
MCH RBC QN AUTO: 30.5 PG (ref 26.8–34.3)
MCHC RBC AUTO-ENTMCNC: 32.3 G/DL (ref 31.4–37.4)
MCV RBC AUTO: 94 FL (ref 82–98)
MONOCYTES # BLD AUTO: 0.54 THOUSAND/ΜL (ref 0.17–1.22)
MONOCYTES NFR BLD AUTO: 9 % (ref 4–12)
NEUTROPHILS # BLD AUTO: 3.12 THOUSANDS/ΜL (ref 1.85–7.62)
NEUTS SEG NFR BLD AUTO: 50 % (ref 43–75)
NONHDLC SERPL-MCNC: 129 MG/DL
NRBC BLD AUTO-RTO: 0 /100 WBCS
PLATELET # BLD AUTO: 256 THOUSANDS/UL (ref 149–390)
PMV BLD AUTO: 10.5 FL (ref 8.9–12.7)
POTASSIUM SERPL-SCNC: 4.3 MMOL/L (ref 3.5–5.3)
PROT SERPL-MCNC: 7.9 G/DL (ref 6.4–8.2)
RBC # BLD AUTO: 4.63 MILLION/UL (ref 3.81–5.12)
SODIUM SERPL-SCNC: 140 MMOL/L (ref 136–145)
TRIGL SERPL-MCNC: 102 MG/DL
WBC # BLD AUTO: 6.23 THOUSAND/UL (ref 4.31–10.16)

## 2020-08-25 PROCEDURE — 80053 COMPREHEN METABOLIC PANEL: CPT

## 2020-08-25 PROCEDURE — 80061 LIPID PANEL: CPT

## 2020-08-25 PROCEDURE — 85025 COMPLETE CBC W/AUTO DIFF WBC: CPT

## 2020-08-25 PROCEDURE — 83036 HEMOGLOBIN GLYCOSYLATED A1C: CPT

## 2020-08-25 PROCEDURE — 36415 COLL VENOUS BLD VENIPUNCTURE: CPT

## 2020-08-27 ENCOUNTER — OFFICE VISIT (OUTPATIENT)
Dept: INTERNAL MEDICINE CLINIC | Facility: CLINIC | Age: 85
End: 2020-08-27
Payer: COMMERCIAL

## 2020-08-27 VITALS
HEART RATE: 64 BPM | DIASTOLIC BLOOD PRESSURE: 70 MMHG | TEMPERATURE: 96.4 F | HEIGHT: 58 IN | RESPIRATION RATE: 12 BRPM | SYSTOLIC BLOOD PRESSURE: 124 MMHG | WEIGHT: 124 LBS | BODY MASS INDEX: 26.03 KG/M2

## 2020-08-27 DIAGNOSIS — R73.01 IMPAIRED FASTING GLUCOSE: Primary | ICD-10-CM

## 2020-08-27 DIAGNOSIS — I10 ESSENTIAL HYPERTENSION: ICD-10-CM

## 2020-08-27 DIAGNOSIS — R26.9 NEUROLOGIC GAIT DYSFUNCTION: ICD-10-CM

## 2020-08-27 DIAGNOSIS — E78.2 MIXED HYPERLIPIDEMIA: ICD-10-CM

## 2020-08-27 DIAGNOSIS — M81.0 AGE-RELATED OSTEOPOROSIS WITHOUT CURRENT PATHOLOGICAL FRACTURE: ICD-10-CM

## 2020-08-27 PROCEDURE — 1036F TOBACCO NON-USER: CPT | Performed by: INTERNAL MEDICINE

## 2020-08-27 PROCEDURE — 99214 OFFICE O/P EST MOD 30 MIN: CPT | Performed by: INTERNAL MEDICINE

## 2020-08-27 PROCEDURE — 3008F BODY MASS INDEX DOCD: CPT | Performed by: INTERNAL MEDICINE

## 2020-08-27 PROCEDURE — 3078F DIAST BP <80 MM HG: CPT | Performed by: INTERNAL MEDICINE

## 2020-08-27 PROCEDURE — 1101F PT FALLS ASSESS-DOCD LE1/YR: CPT | Performed by: INTERNAL MEDICINE

## 2020-08-27 PROCEDURE — 4040F PNEUMOC VAC/ADMIN/RCVD: CPT | Performed by: INTERNAL MEDICINE

## 2020-08-27 PROCEDURE — 3288F FALL RISK ASSESSMENT DOCD: CPT | Performed by: INTERNAL MEDICINE

## 2020-08-27 PROCEDURE — 1160F RVW MEDS BY RX/DR IN RCRD: CPT | Performed by: INTERNAL MEDICINE

## 2020-08-27 PROCEDURE — 3725F SCREEN DEPRESSION PERFORMED: CPT | Performed by: INTERNAL MEDICINE

## 2020-08-27 PROCEDURE — 3074F SYST BP LT 130 MM HG: CPT | Performed by: INTERNAL MEDICINE

## 2020-08-27 RX ORDER — CETIRIZINE HYDROCHLORIDE 10 MG/1
10 TABLET ORAL DAILY
COMMUNITY

## 2020-08-27 NOTE — PROGRESS NOTES
Assessment/Plan:    Diagnoses and all orders for this visit:    Impaired fasting glucose  -     Hemoglobin A1C; Future    Essential hypertension  -     CBC and differential; Future  -     Comprehensive metabolic panel; Future  -     Lipid panel; Future  -     TSH, 3rd generation with Free T4 reflex; Future    Age-related osteoporosis without current pathological fracture    Mixed hyperlipidemia    Neurologic gait dysfunction  -     Elevated toilet seat  -     Walker  -     Shower chair    Other orders  -     cetirizine (ZyrTEC) 10 mg tablet; Take 10 mg by mouth daily         Patient Instructions   Lab data reviewed in detail and compared prior    Gait dysfunction-prescriptions for toilet seat, walk-in tub, shower chair, Rollator provided    Hyperlipidemia at goal on atorvastatin    Impaired fasting glucose stable with A1c 5 8    Osteoporosis-patient declines treatment    Routine follow-up after labs in 6 months, sooner as needed  Subjective:      Patient ID: Sachi Wagoner is a 80 y o  female    F/u mmp and review labs  Feeling generally well  Has concerns w/ tub and stepping in  Wants walk in tub and walker  Needs raised toilet seat  HPL-tolerating atorvastatin  Vision has continued to decline, still managing house, cooking, baking w/o difficulty  F/u w/ MD f/u every 6 mos, glaucoma f/u q 4 mos  IFG-watching carbs  Exercising regularly w/ walks in the halls in early am, less walks outside d/t heat  Notes some arthitic pain in r knee stable  Osteoporosis- She continues to refuse bisphosphonates or other anti resorptive rx  No issues w/ balance, climbs stairs to second fl apt most times  Still cooking and cleaning for self, no driving x 10 yrs  [facility] w/ driving to apts             Current Outpatient Medications:     acetaminophen (TYLENOL) 500 mg tablet, Take 500 mg by mouth as needed for mild pain, Disp: , Rfl:     atorvastatin (LIPITOR) 20 mg tablet, TAKE 1 TABLET (20 MG TOTAL) BY MOUTH DAILY, Disp: 90 tablet, Rfl: 3    cetirizine (ZyrTEC) 10 mg tablet, Take 10 mg by mouth daily, Disp: , Rfl:     Cholecalciferol (CVS VITAMIN D) 2000 units CAPS, Take by mouth, Disp: , Rfl:     Multiple Vitamins-Minerals (CENTRUM SILVER 50+WOMEN) TABS, Take by mouth, Disp: , Rfl:     RHOPRESSA 0 02 % SOLN, instill 1 drop into both eyes at bedtime, Disp: , Rfl: 0    SIMBRINZA 1-0 2 % SUSP, , Disp: , Rfl: 0    VYZULTA 0 024 % SOLN, , Disp: , Rfl: 0    Recent Results (from the past 1008 hour(s))   CBC and differential    Collection Time: 08/25/20  9:28 AM   Result Value Ref Range    WBC 6 23 4 31 - 10 16 Thousand/uL    RBC 4 63 3 81 - 5 12 Million/uL    Hemoglobin 14 1 11 5 - 15 4 g/dL    Hematocrit 43 7 34 8 - 46 1 %    MCV 94 82 - 98 fL    MCH 30 5 26 8 - 34 3 pg    MCHC 32 3 31 4 - 37 4 g/dL    RDW 13 8 11 6 - 15 1 %    MPV 10 5 8 9 - 12 7 fL    Platelets 509 191 - 970 Thousands/uL    nRBC 0 /100 WBCs    Neutrophils Relative 50 43 - 75 %    Immat GRANS % 0 0 - 2 %    Lymphocytes Relative 34 14 - 44 %    Monocytes Relative 9 4 - 12 %    Eosinophils Relative 6 0 - 6 %    Basophils Relative 1 0 - 1 %    Neutrophils Absolute 3 12 1 85 - 7 62 Thousands/µL    Immature Grans Absolute 0 02 0 00 - 0 20 Thousand/uL    Lymphocytes Absolute 2 14 0 60 - 4 47 Thousands/µL    Monocytes Absolute 0 54 0 17 - 1 22 Thousand/µL    Eosinophils Absolute 0 34 0 00 - 0 61 Thousand/µL    Basophils Absolute 0 07 0 00 - 0 10 Thousands/µL   Comprehensive metabolic panel    Collection Time: 08/25/20  9:28 AM   Result Value Ref Range    Sodium 140 136 - 145 mmol/L    Potassium 4 3 3 5 - 5 3 mmol/L    Chloride 109 (H) 100 - 108 mmol/L    CO2 27 21 - 32 mmol/L    ANION GAP 4 4 - 13 mmol/L    BUN 20 5 - 25 mg/dL    Creatinine 0 80 0 60 - 1 30 mg/dL    Glucose, Fasting 114 (H) 65 - 99 mg/dL    Calcium 8 9 8 3 - 10 1 mg/dL    AST 35 5 - 45 U/L    ALT 50 12 - 78 U/L    Alkaline Phosphatase 75 46 - 116 U/L    Total Protein 7 9 6 4 - 8 2 g/dL    Albumin 3 7 3 5 - 5 0 g/dL    Total Bilirubin 0 46 0 20 - 1 00 mg/dL    eGFR 65 ml/min/1 73sq m   Lipid panel    Collection Time: 08/25/20  9:28 AM   Result Value Ref Range    Cholesterol 177 50 - 200 mg/dL    Triglycerides 102 <=150 mg/dL    HDL, Direct 48 >=40 mg/dL    LDL Calculated 109 (H) 0 - 100 mg/dL    Non-HDL-Chol (CHOL-HDL) 129 mg/dl   Hemoglobin A1C    Collection Time: 08/25/20  9:28 AM   Result Value Ref Range    Hemoglobin A1C 5 8 (H) Normal 3 8-5 6%; PreDiabetic 5 7-6 4%; Diabetic >=6 5%; Glycemic control for adults with diabetes <7 0% %     mg/dl       The following portions of the patient's history were reviewed and updated as appropriate: allergies, current medications, past family history, past medical history, past social history, past surgical history and problem list      Review of Systems   Constitutional: Negative for appetite change, chills, diaphoresis, fatigue, fever and unexpected weight change  HENT: Negative for congestion, hearing loss and rhinorrhea  Eyes: Negative for visual disturbance  Respiratory: Negative for cough, chest tightness, shortness of breath and wheezing  Cardiovascular: Negative for chest pain, palpitations and leg swelling  Gastrointestinal: Negative for abdominal pain and blood in stool  Endocrine: Negative for cold intolerance, heat intolerance, polydipsia and polyuria  Genitourinary: Negative for difficulty urinating, dysuria, frequency and urgency  Musculoskeletal: Negative for arthralgias and myalgias  Skin: Negative for rash  Neurological: Negative for dizziness, weakness, light-headedness and headaches  Hematological: Does not bruise/bleed easily  Psychiatric/Behavioral: Negative for dysphoric mood and sleep disturbance           Objective:      Vitals:    08/27/20 1102   BP: 124/70   Pulse: 64   Resp: 12   Temp: (!) 96 4 °F (35 8 °C)          Physical Exam  Constitutional:       Appearance: She is well-developed  HENT:      Head: Normocephalic and atraumatic  Nose: Nose normal    Eyes:      General: No scleral icterus  Conjunctiva/sclera: Conjunctivae normal       Pupils: Pupils are equal, round, and reactive to light  Neck:      Musculoskeletal: Normal range of motion and neck supple  Thyroid: No thyromegaly  Vascular: No JVD  Trachea: No tracheal deviation  Cardiovascular:      Rate and Rhythm: Normal rate and regular rhythm  Heart sounds: Murmur present  No friction rub  No gallop  Pulmonary:      Effort: Pulmonary effort is normal  No respiratory distress  Breath sounds: Normal breath sounds  No wheezing or rales  Musculoskeletal:         General: No deformity  Lymphadenopathy:      Cervical: No cervical adenopathy  Skin:     General: Skin is warm and dry  Coloration: Skin is not pale  Findings: No erythema or rash  Comments: Scattered hyperpigmented nevi on upper chest and back  There is approximately 5 mm raised flesh-colored papular lesion on upper back    Healing abrasion on right shin without exudate or erythema   Neurological:      Mental Status: She is alert and oriented to person, place, and time  Cranial Nerves: No cranial nerve deficit  Psychiatric:         Behavior: Behavior normal          Thought Content:  Thought content normal          Judgment: Judgment normal

## 2020-08-27 NOTE — PATIENT INSTRUCTIONS
Lab data reviewed in detail and compared prior    Gait dysfunction-prescriptions for toilet seat, walk-in tub, shower chair, Rollator provided    Hyperlipidemia at goal on atorvastatin    Impaired fasting glucose stable with A1c 5 8    Osteoporosis-patient declines treatment    Routine follow-up after labs in 6 months, sooner as needed

## 2020-09-22 ENCOUNTER — TELEPHONE (OUTPATIENT)
Dept: INTERNAL MEDICINE CLINIC | Facility: CLINIC | Age: 85
End: 2020-09-22

## 2020-09-22 NOTE — TELEPHONE ENCOUNTER
She faxed 9/14 and will re fax now a form "reason modification form" that needs to be completed and faxed back to them asap    pt is requesting a tub cut out    she has a RX from  but said the form is what she needs      Fax

## 2020-09-24 NOTE — TELEPHONE ENCOUNTER
Dhaval Fu from [facility] called, she received the fax but the last page was cut off   Needs us to re-fax

## 2021-01-26 ENCOUNTER — OFFICE VISIT (OUTPATIENT)
Dept: INTERNAL MEDICINE CLINIC | Facility: CLINIC | Age: 86
End: 2021-01-26
Payer: COMMERCIAL

## 2021-01-26 VITALS — TEMPERATURE: 97.5 F

## 2021-01-26 DIAGNOSIS — F41.9 ANXIETY: Primary | ICD-10-CM

## 2021-01-26 DIAGNOSIS — E78.5 HYPERLIPIDEMIA, UNSPECIFIED HYPERLIPIDEMIA TYPE: ICD-10-CM

## 2021-01-26 PROCEDURE — 99442 PR PHYS/QHP TELEPHONE EVALUATION 11-20 MIN: CPT | Performed by: INTERNAL MEDICINE

## 2021-01-26 RX ORDER — ATORVASTATIN CALCIUM 20 MG/1
20 TABLET, FILM COATED ORAL DAILY
Qty: 90 TABLET | Refills: 1 | Status: SHIPPED | OUTPATIENT
Start: 2021-01-26 | End: 2021-02-09 | Stop reason: HOSPADM

## 2021-01-26 RX ORDER — MIRTAZAPINE 7.5 MG/1
7.5 TABLET, FILM COATED ORAL
Qty: 30 TABLET | Refills: 5 | Status: SHIPPED | OUTPATIENT
Start: 2021-01-26 | End: 2021-02-26
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
## 2021-01-28 ENCOUNTER — HOSPITAL ENCOUNTER (INPATIENT)
Facility: HOSPITAL | Age: 86
LOS: 12 days | Discharge: NURSING, REHAB OR LONG-TERM CARE | DRG: 375 | End: 2021-02-09
Attending: EMERGENCY MEDICINE | Admitting: INTERNAL MEDICINE
Payer: COMMERCIAL

## 2021-01-28 ENCOUNTER — APPOINTMENT (EMERGENCY)
Dept: CT IMAGING | Facility: HOSPITAL | Age: 86
DRG: 375 | End: 2021-01-28
Payer: COMMERCIAL

## 2021-01-28 ENCOUNTER — LAB (OUTPATIENT)
Dept: LAB | Facility: CLINIC | Age: 86
End: 2021-01-28
Payer: COMMERCIAL

## 2021-01-28 ENCOUNTER — TELEPHONE (OUTPATIENT)
Dept: INTERNAL MEDICINE CLINIC | Facility: CLINIC | Age: 86
End: 2021-01-28

## 2021-01-28 DIAGNOSIS — R05.9 COUGH: ICD-10-CM

## 2021-01-28 DIAGNOSIS — R93.89 ABNORMAL CT SCAN: ICD-10-CM

## 2021-01-28 DIAGNOSIS — Z01.810 PREOP CARDIOVASCULAR EXAM: ICD-10-CM

## 2021-01-28 DIAGNOSIS — K63.89 COLONIC MASS: ICD-10-CM

## 2021-01-28 DIAGNOSIS — R73.01 IMPAIRED FASTING GLUCOSE: ICD-10-CM

## 2021-01-28 DIAGNOSIS — K81.9 CHOLECYSTITIS: ICD-10-CM

## 2021-01-28 DIAGNOSIS — K63.89 SMALL BOWEL MASS: Primary | ICD-10-CM

## 2021-01-28 DIAGNOSIS — M81.0 AGE-RELATED OSTEOPOROSIS WITHOUT CURRENT PATHOLOGICAL FRACTURE: ICD-10-CM

## 2021-01-28 DIAGNOSIS — I10 ESSENTIAL HYPERTENSION: ICD-10-CM

## 2021-01-28 DIAGNOSIS — K21.9 GASTROESOPHAGEAL REFLUX DISEASE WITHOUT ESOPHAGITIS: ICD-10-CM

## 2021-01-28 DIAGNOSIS — R74.01 TRANSAMINITIS: ICD-10-CM

## 2021-01-28 PROBLEM — R82.90 ABNORMAL FINDING ON URINALYSIS: Status: ACTIVE | Noted: 2021-01-28

## 2021-01-28 PROBLEM — R03.0 ELEVATED BLOOD PRESSURE READING: Status: ACTIVE | Noted: 2021-01-28

## 2021-01-28 LAB
ALBUMIN SERPL BCP-MCNC: 2.7 G/DL (ref 3.5–5)
ALBUMIN SERPL BCP-MCNC: 3 G/DL (ref 3.5–5)
ALP SERPL-CCNC: 404 U/L (ref 46–116)
ALP SERPL-CCNC: 423 U/L (ref 46–116)
ALT SERPL W P-5'-P-CCNC: 1448 U/L (ref 12–78)
ALT SERPL W P-5'-P-CCNC: 1551 U/L (ref 12–78)
ANION GAP SERPL CALCULATED.3IONS-SCNC: -1 MMOL/L (ref 4–13)
ANION GAP SERPL CALCULATED.3IONS-SCNC: 7 MMOL/L (ref 4–13)
APTT PPP: 29 SECONDS (ref 23–37)
AST SERPL W P-5'-P-CCNC: 1504 U/L (ref 5–45)
AST SERPL W P-5'-P-CCNC: 1654 U/L (ref 5–45)
BACTERIA UR QL AUTO: NORMAL /HPF
BASOPHILS # BLD AUTO: 0.07 THOUSANDS/ΜL (ref 0–0.1)
BASOPHILS # BLD AUTO: 0.08 THOUSANDS/ΜL (ref 0–0.1)
BASOPHILS NFR BLD AUTO: 1 % (ref 0–1)
BASOPHILS NFR BLD AUTO: 1 % (ref 0–1)
BILIRUB DIRECT SERPL-MCNC: 1.28 MG/DL (ref 0–0.2)
BILIRUB SERPL-MCNC: 1.76 MG/DL (ref 0.2–1)
BILIRUB SERPL-MCNC: 2 MG/DL (ref 0.2–1)
BILIRUB UR QL STRIP: ABNORMAL
BUN SERPL-MCNC: 21 MG/DL (ref 5–25)
BUN SERPL-MCNC: 27 MG/DL (ref 5–25)
CALCIUM ALBUM COR SERPL-MCNC: 10 MG/DL (ref 8.3–10.1)
CALCIUM ALBUM COR SERPL-MCNC: 10.4 MG/DL (ref 8.3–10.1)
CALCIUM SERPL-MCNC: 9 MG/DL (ref 8.3–10.1)
CALCIUM SERPL-MCNC: 9.6 MG/DL (ref 8.3–10.1)
CHLORIDE SERPL-SCNC: 105 MMOL/L (ref 100–108)
CHLORIDE SERPL-SCNC: 109 MMOL/L (ref 100–108)
CHOLEST SERPL-MCNC: 154 MG/DL (ref 50–200)
CLARITY UR: CLEAR
CO2 SERPL-SCNC: 26 MMOL/L (ref 21–32)
CO2 SERPL-SCNC: 31 MMOL/L (ref 21–32)
COLOR UR: YELLOW
CREAT SERPL-MCNC: 1 MG/DL (ref 0.6–1.3)
CREAT SERPL-MCNC: 1.05 MG/DL (ref 0.6–1.3)
CRP SERPL QL: 31.4 MG/L
EOSINOPHIL # BLD AUTO: 0.28 THOUSAND/ΜL (ref 0–0.61)
EOSINOPHIL # BLD AUTO: 0.33 THOUSAND/ΜL (ref 0–0.61)
EOSINOPHIL NFR BLD AUTO: 5 % (ref 0–6)
EOSINOPHIL NFR BLD AUTO: 5 % (ref 0–6)
ERYTHROCYTE [DISTWIDTH] IN BLOOD BY AUTOMATED COUNT: 15.2 % (ref 11.6–15.1)
ERYTHROCYTE [DISTWIDTH] IN BLOOD BY AUTOMATED COUNT: 15.2 % (ref 11.6–15.1)
EST. AVERAGE GLUCOSE BLD GHB EST-MCNC: 117 MG/DL
GFR SERPL CREATININE-BSD FRML MDRD: 47 ML/MIN/1.73SQ M
GFR SERPL CREATININE-BSD FRML MDRD: 49 ML/MIN/1.73SQ M
GLUCOSE P FAST SERPL-MCNC: 108 MG/DL (ref 65–99)
GLUCOSE SERPL-MCNC: 134 MG/DL (ref 65–140)
GLUCOSE UR STRIP-MCNC: NEGATIVE MG/DL
HBA1C MFR BLD: 5.7 %
HCT VFR BLD AUTO: 42 % (ref 34.8–46.1)
HCT VFR BLD AUTO: 44.8 % (ref 34.8–46.1)
HDLC SERPL-MCNC: 9 MG/DL
HGB BLD-MCNC: 13.6 G/DL (ref 11.5–15.4)
HGB BLD-MCNC: 14.4 G/DL (ref 11.5–15.4)
HGB UR QL STRIP.AUTO: NEGATIVE
IMM GRANULOCYTES # BLD AUTO: 0.02 THOUSAND/UL (ref 0–0.2)
IMM GRANULOCYTES # BLD AUTO: 0.03 THOUSAND/UL (ref 0–0.2)
IMM GRANULOCYTES NFR BLD AUTO: 0 % (ref 0–2)
IMM GRANULOCYTES NFR BLD AUTO: 0 % (ref 0–2)
INR PPP: 1.13 (ref 0.84–1.19)
KETONES UR STRIP-MCNC: NEGATIVE MG/DL
LDLC SERPL CALC-MCNC: 104 MG/DL (ref 0–100)
LEUKOCYTE ESTERASE UR QL STRIP: NEGATIVE
LIPASE SERPL-CCNC: 204 U/L (ref 73–393)
LYMPHOCYTES # BLD AUTO: 1.6 THOUSANDS/ΜL (ref 0.6–4.47)
LYMPHOCYTES # BLD AUTO: 1.91 THOUSANDS/ΜL (ref 0.6–4.47)
LYMPHOCYTES NFR BLD AUTO: 25 % (ref 14–44)
LYMPHOCYTES NFR BLD AUTO: 29 % (ref 14–44)
MCH RBC QN AUTO: 29.4 PG (ref 26.8–34.3)
MCH RBC QN AUTO: 29.8 PG (ref 26.8–34.3)
MCHC RBC AUTO-ENTMCNC: 32.1 G/DL (ref 31.4–37.4)
MCHC RBC AUTO-ENTMCNC: 32.4 G/DL (ref 31.4–37.4)
MCV RBC AUTO: 91 FL (ref 82–98)
MCV RBC AUTO: 93 FL (ref 82–98)
MONOCYTES # BLD AUTO: 0.78 THOUSAND/ΜL (ref 0.17–1.22)
MONOCYTES # BLD AUTO: 1.01 THOUSAND/ΜL (ref 0.17–1.22)
MONOCYTES NFR BLD AUTO: 12 % (ref 4–12)
MONOCYTES NFR BLD AUTO: 15 % (ref 4–12)
NEUTROPHILS # BLD AUTO: 3.33 THOUSANDS/ΜL (ref 1.85–7.62)
NEUTROPHILS # BLD AUTO: 3.54 THOUSANDS/ΜL (ref 1.85–7.62)
NEUTS SEG NFR BLD AUTO: 50 % (ref 43–75)
NEUTS SEG NFR BLD AUTO: 57 % (ref 43–75)
NITRITE UR QL STRIP: POSITIVE
NON-SQ EPI CELLS URNS QL MICRO: NORMAL /HPF
NONHDLC SERPL-MCNC: 145 MG/DL
NRBC BLD AUTO-RTO: 0 /100 WBCS
NRBC BLD AUTO-RTO: 0 /100 WBCS
PH UR STRIP.AUTO: 5.5 [PH]
PLATELET # BLD AUTO: 260 THOUSANDS/UL (ref 149–390)
PLATELET # BLD AUTO: 275 THOUSANDS/UL (ref 149–390)
PMV BLD AUTO: 10.4 FL (ref 8.9–12.7)
PMV BLD AUTO: 10.6 FL (ref 8.9–12.7)
POTASSIUM SERPL-SCNC: 4.1 MMOL/L (ref 3.5–5.3)
POTASSIUM SERPL-SCNC: 4.1 MMOL/L (ref 3.5–5.3)
PROT SERPL-MCNC: 8.4 G/DL (ref 6.4–8.2)
PROT SERPL-MCNC: 8.8 G/DL (ref 6.4–8.2)
PROT UR STRIP-MCNC: ABNORMAL MG/DL
PROTHROMBIN TIME: 13.9 SECONDS (ref 11.6–14.5)
RBC # BLD AUTO: 4.62 MILLION/UL (ref 3.81–5.12)
RBC # BLD AUTO: 4.83 MILLION/UL (ref 3.81–5.12)
RBC #/AREA URNS AUTO: NORMAL /HPF
SODIUM SERPL-SCNC: 138 MMOL/L (ref 136–145)
SODIUM SERPL-SCNC: 139 MMOL/L (ref 136–145)
SP GR UR STRIP.AUTO: 1.01 (ref 1–1.03)
TRIGL SERPL-MCNC: 205 MG/DL
TROPONIN I SERPL-MCNC: <0.02 NG/ML
TSH SERPL DL<=0.05 MIU/L-ACNC: 2.68 UIU/ML (ref 0.36–3.74)
UROBILINOGEN UR QL STRIP.AUTO: 1 E.U./DL
WBC # BLD AUTO: 6.29 THOUSAND/UL (ref 4.31–10.16)
WBC # BLD AUTO: 6.69 THOUSAND/UL (ref 4.31–10.16)
WBC #/AREA URNS AUTO: NORMAL /HPF

## 2021-01-28 PROCEDURE — 82248 BILIRUBIN DIRECT: CPT | Performed by: FAMILY MEDICINE

## 2021-01-28 PROCEDURE — 36415 COLL VENOUS BLD VENIPUNCTURE: CPT

## 2021-01-28 PROCEDURE — 99285 EMERGENCY DEPT VISIT HI MDM: CPT | Performed by: EMERGENCY MEDICINE

## 2021-01-28 PROCEDURE — 83690 ASSAY OF LIPASE: CPT | Performed by: FAMILY MEDICINE

## 2021-01-28 PROCEDURE — 80061 LIPID PANEL: CPT

## 2021-01-28 PROCEDURE — 85730 THROMBOPLASTIN TIME PARTIAL: CPT | Performed by: EMERGENCY MEDICINE

## 2021-01-28 PROCEDURE — 85025 COMPLETE CBC W/AUTO DIFF WBC: CPT | Performed by: EMERGENCY MEDICINE

## 2021-01-28 PROCEDURE — 96361 HYDRATE IV INFUSION ADD-ON: CPT

## 2021-01-28 PROCEDURE — 80053 COMPREHEN METABOLIC PANEL: CPT

## 2021-01-28 PROCEDURE — 99223 1ST HOSP IP/OBS HIGH 75: CPT | Performed by: FAMILY MEDICINE

## 2021-01-28 PROCEDURE — 96365 THER/PROPH/DIAG IV INF INIT: CPT

## 2021-01-28 PROCEDURE — 86140 C-REACTIVE PROTEIN: CPT | Performed by: FAMILY MEDICINE

## 2021-01-28 PROCEDURE — 81001 URINALYSIS AUTO W/SCOPE: CPT | Performed by: EMERGENCY MEDICINE

## 2021-01-28 PROCEDURE — 93005 ELECTROCARDIOGRAM TRACING: CPT

## 2021-01-28 PROCEDURE — 80053 COMPREHEN METABOLIC PANEL: CPT | Performed by: EMERGENCY MEDICINE

## 2021-01-28 PROCEDURE — 85025 COMPLETE CBC W/AUTO DIFF WBC: CPT

## 2021-01-28 PROCEDURE — 99285 EMERGENCY DEPT VISIT HI MDM: CPT

## 2021-01-28 PROCEDURE — 74177 CT ABD & PELVIS W/CONTRAST: CPT

## 2021-01-28 PROCEDURE — 84145 PROCALCITONIN (PCT): CPT | Performed by: FAMILY MEDICINE

## 2021-01-28 PROCEDURE — 83036 HEMOGLOBIN GLYCOSYLATED A1C: CPT

## 2021-01-28 PROCEDURE — 84484 ASSAY OF TROPONIN QUANT: CPT | Performed by: EMERGENCY MEDICINE

## 2021-01-28 PROCEDURE — G1004 CDSM NDSC: HCPCS

## 2021-01-28 PROCEDURE — 85610 PROTHROMBIN TIME: CPT | Performed by: EMERGENCY MEDICINE

## 2021-01-28 PROCEDURE — 84443 ASSAY THYROID STIM HORMONE: CPT

## 2021-01-28 RX ORDER — SODIUM CHLORIDE 9 MG/ML
100 INJECTION, SOLUTION INTRAVENOUS CONTINUOUS
Status: DISCONTINUED | OUTPATIENT
Start: 2021-01-28 | End: 2021-02-01

## 2021-01-28 RX ORDER — ONDANSETRON 2 MG/ML
4 INJECTION INTRAMUSCULAR; INTRAVENOUS EVERY 6 HOURS PRN
Status: DISCONTINUED | OUTPATIENT
Start: 2021-01-28 | End: 2021-02-09 | Stop reason: HOSPADM

## 2021-01-28 RX ORDER — HEPARIN SODIUM 5000 [USP'U]/ML
5000 INJECTION, SOLUTION INTRAVENOUS; SUBCUTANEOUS EVERY 12 HOURS SCHEDULED
Status: DISCONTINUED | OUTPATIENT
Start: 2021-01-28 | End: 2021-02-09 | Stop reason: HOSPADM

## 2021-01-28 RX ADMIN — SODIUM CHLORIDE 100 ML/HR: 0.9 INJECTION, SOLUTION INTRAVENOUS at 23:46

## 2021-01-28 RX ADMIN — CEFEPIME HYDROCHLORIDE 2000 MG: 2 INJECTION, POWDER, FOR SOLUTION INTRAVENOUS at 19:27

## 2021-01-28 RX ADMIN — PIPERACILLIN AND TAZOBACTAM 3.38 G: 36; 4.5 INJECTION, POWDER, FOR SOLUTION INTRAVENOUS at 23:47

## 2021-01-28 RX ADMIN — IOHEXOL 100 ML: 350 INJECTION, SOLUTION INTRAVENOUS at 18:10

## 2021-01-28 RX ADMIN — SODIUM CHLORIDE 1000 ML: 0.9 INJECTION, SOLUTION INTRAVENOUS at 18:02

## 2021-01-28 RX ADMIN — HEPARIN SODIUM 5000 UNITS: 5000 INJECTION INTRAVENOUS; SUBCUTANEOUS at 23:49

## 2021-01-28 NOTE — ED PROVIDER NOTES
History  Chief Complaint   Patient presents with    Abnormal Lab     Pt reports she was told by provider to come in for "abnormal liver lab'  Pt reorts feeling tired and weak lately  Patient is a 27-year-old female past medical history of hypertension, hyperlipidemia presenting for abnormal lab and fatigue  Patient states that she has been increasingly exhausted none steady on her feet over the past 4 days  She had lab work done at PCP office this morning was told to come to the emergency department for elevated liver enzymes  She denies any fevers, chest pain, abdominal pain, back pain, shortness of breath, swelling, nausea/vomiting/diarrhea/constipation, rashes, vision changes, cough, respiratory symptoms, dysuria  She states that she did take a sleeping pill last night but she had never taken before but other than that denies any medication changes  Does note intermittent lightheadedness with standing  Has been eating and drinking normally today  Denies alcohol use  Prior to Admission Medications   Prescriptions Last Dose Informant Patient Reported? Taking?    Cholecalciferol ([employer] VITAMIN D) 2000 units CAPS  Self Yes No   Sig: Take by mouth   Multiple Vitamins-Minerals (CENTRUM SILVER 50+WOMEN) TABS  Self Yes No   Sig: Take by mouth   RHOPRESSA 0 02 % SOLN  Self Yes No   Sig: instill 1 drop into both eyes at bedtime   SIMBRINZA 1-0 2 % SUSP  Self Yes No   VYZULTA 0 024 % SOLN  Self Yes No   acetaminophen (TYLENOL) 500 mg tablet  Self Yes No   Sig: Take 500 mg by mouth as needed for mild pain   atorvastatin (LIPITOR) 20 mg tablet   No No   Sig: Take 1 tablet (20 mg total) by mouth daily   cetirizine (ZyrTEC) 10 mg tablet  Self Yes No   Sig: Take 10 mg by mouth daily   mirtazapine (REMERON) 7 5 MG tablet   No No   Sig: Take 1 tablet (7 5 mg total) by mouth daily at bedtime      Facility-Administered Medications: None       Past Medical History:   Diagnosis Date    Hyperlipidemia     Osteoporosis     Sciatica        Past Surgical History:   Procedure Laterality Date     SECTION         Family History   Problem Relation Age of Onset    Diabetes type II Mother     Pancreatic cancer Father     Colon cancer Sister      I have reviewed and agree with the history as documented  E-Cigarette/Vaping    E-Cigarette Use Never User      E-Cigarette/Vaping Substances    Nicotine No     Flavoring No      Social History     Tobacco Use    Smoking status: Never Smoker    Smokeless tobacco: Never Used   Substance Use Topics    Alcohol use: Not Currently    Drug use: No       Review of Systems   All other systems reviewed and are negative  Physical Exam  Physical Exam  Vitals signs reviewed  Constitutional:       General: She is not in acute distress  Appearance: Normal appearance  She is not ill-appearing  HENT:      Mouth/Throat:      Mouth: Mucous membranes are moist    Eyes:      Conjunctiva/sclera: Conjunctivae normal    Neck:      Musculoskeletal: Neck supple  Cardiovascular:      Rate and Rhythm: Normal rate and regular rhythm  Heart sounds: Normal heart sounds  Pulmonary:      Effort: Pulmonary effort is normal       Breath sounds: Normal breath sounds  Abdominal:      General: Abdomen is flat  Palpations: Abdomen is soft  Tenderness: There is abdominal tenderness  There is no guarding  Comments: Non localizable mild abdominal tenderness without guarding   Musculoskeletal: Normal range of motion  General: No swelling  Skin:     General: Skin is warm and dry  Neurological:      General: No focal deficit present  Mental Status: She is alert     Psychiatric:         Mood and Affect: Mood normal          Vital Signs  ED Triage Vitals   Temperature Pulse Respirations Blood Pressure SpO2   21 1728 21 1728 21 1728 21 1728 21 1728   97 9 °F (36 6 °C) 91 18 145/73 98 %      Temp src Heart Rate Source Patient Position - Orthostatic VS BP Location FiO2 (%)   -- 01/28/21 1728 01/28/21 1845 01/28/21 1845 --    Monitor Lying Right arm       Pain Score       --                  Vitals:    01/28/21 1845 01/28/21 1900 01/28/21 2030 01/28/21 2100   BP: 140/60 135/63 162/67 141/64   Pulse: 89 78 72 67   Patient Position - Orthostatic VS: Lying Lying  Sitting         Visual Acuity      ED Medications  Medications   sodium chloride 0 9 % bolus 1,000 mL (0 mL Intravenous Stopped 1/28/21 1921)   iohexol (OMNIPAQUE) 350 MG/ML injection (SINGLE-DOSE) 100 mL (100 mL Intravenous Given 1/28/21 1810)   cefepime (MAXIPIME) 2,000 mg in dextrose 5 % 50 mL IVPB (0 mg Intravenous Stopped 1/28/21 2104)       Diagnostic Studies  Results Reviewed     Procedure Component Value Units Date/Time    Lipase [[id number]]  (Normal) Collected: 01/28/21 1759    Lab Status: Final result Specimen: Blood from Arm, Left Updated: 01/28/21 2125     Lipase 204 u/L     C-reactive protein [[id number]]  (Abnormal) Collected: 01/28/21 1759    Lab Status: Final result Specimen: Blood from Arm, Left Updated: 01/28/21 2112     CRP 31 4 mg/L     Bilirubin, direct [[id number]]  (Abnormal) Collected: 01/28/21 1759    Lab Status: Final result Specimen: Blood from Arm, Left Updated: 01/28/21 2112     Bilirubin, Direct 1 28 mg/dL     Procalcitonin with AM Reflex [[id number]]     Lab Status: No result Specimen: Blood     Urine Microscopic [[id number]]  (Normal) Collected: 01/28/21 1846    Lab Status: Final result Specimen: Urine, Clean Catch Updated: 01/28/21 1900     RBC, UA None Seen /hpf      WBC, UA 2-4 /hpf      Epithelial Cells Occasional /hpf      Bacteria, UA Occasional /hpf     UA w Reflex to Microscopic w Reflex to Culture [[id number]]  (Abnormal) Collected: 01/28/21 1846    Lab Status: Final result Specimen: Urine, Clean Catch Updated: 01/28/21 1852     Color, UA Yellow     Clarity, UA Clear     Specific Gravity, UA 1 015     pH, UA 5 5     Leukocytes, UA Negative Nitrite, UA Positive     Protein, UA Trace mg/dl      Glucose, UA Negative mg/dl      Ketones, UA Negative mg/dl      Urobilinogen, UA 1 0 E U /dl      Bilirubin, UA Small     Blood, UA Negative    Comprehensive metabolic panel [[id number]]  (Abnormal) Collected: 01/28/21 1759    Lab Status: Final result Specimen: Blood from Arm, Left Updated: 01/28/21 1842     Sodium 138 mmol/L      Potassium 4 1 mmol/L      Chloride 105 mmol/L      CO2 26 mmol/L      ANION GAP 7 mmol/L      BUN 27 mg/dL      Creatinine 1 05 mg/dL      Glucose 134 mg/dL      Calcium 9 0 mg/dL      Corrected Calcium 10 0 mg/dL      AST 1,504 U/L      ALT 1,448 U/L      Alkaline Phosphatase 404 U/L      Total Protein 8 4 g/dL      Albumin 2 7 g/dL      Total Bilirubin 2 00 mg/dL      eGFR 47 ml/min/1 73sq m     Narrative:      [facility] guidelines for Chronic Kidney Disease (CKD):     Stage 1 with normal or high GFR (GFR > 90 mL/min/1 73 square meters)    Stage 2 Mild CKD (GFR = 60-89 mL/min/1 73 square meters)    Stage 3A Moderate CKD (GFR = 45-59 mL/min/1 73 square meters)    Stage 3B Moderate CKD (GFR = 30-44 mL/min/1 73 square meters)    Stage 4 Severe CKD (GFR = 15-29 mL/min/1 73 square meters)    Stage 5 End Stage CKD (GFR <15 mL/min/1 73 square meters)  Note: GFR calculation is accurate only with a steady state creatinine    Troponin I [[id number]]  (Normal) Collected: 01/28/21 1759    Lab Status: Final result Specimen: Blood from Arm, Left Updated: 01/28/21 1828     Troponin I <0 02 ng/mL     Protime-INR [[id number]]  (Normal) Collected: 01/28/21 1759    Lab Status: Final result Specimen: Blood from Arm, Left Updated: 01/28/21 1819     Protime 13 9 seconds      INR 1 13    APTT [[id number]]  (Normal) Collected: 01/28/21 1759    Lab Status: Final result Specimen: Blood from Arm, Left Updated: 01/28/21 1819     PTT 29 seconds     CBC and differential [[id number]]  (Abnormal) Collected: 01/28/21 1759    Lab Status: Final result Specimen: Blood from Arm, Left Updated: 01/28/21 1807     WBC 6 69 Thousand/uL      RBC 4 62 Million/uL      Hemoglobin 13 6 g/dL      Hematocrit 42 0 %      MCV 91 fL      MCH 29 4 pg      MCHC 32 4 g/dL      RDW 15 2 %      MPV 10 4 fL      Platelets 445 Thousands/uL      nRBC 0 /100 WBCs      Neutrophils Relative 50 %      Immat GRANS % 0 %      Lymphocytes Relative 29 %      Monocytes Relative 15 %      Eosinophils Relative 5 %      Basophils Relative 1 %      Neutrophils Absolute 3 33 Thousands/µL      Immature Grans Absolute 0 03 Thousand/uL      Lymphocytes Absolute 1 91 Thousands/µL      Monocytes Absolute 1 01 Thousand/µL      Eosinophils Absolute 0 33 Thousand/µL      Basophils Absolute 0 08 Thousands/µL                  CT abdomen pelvis with contrast   Final Result by Fannie Mistry MD (01/28 1827)      Soft tissue mass in the ascending colon just above the ileocecal valve consistent with neoplasm measuring 3 9 x 2 1 cm  Multiple small mesenteric nodes are noted adjacent to the mass seen on image 601/65  Recommend colonoscopy  Pericholecystic fluid  No calcified gallstones  Possible acalculous cholecystitis  Correlate clinically  Consider ultrasound if any clinical ambiguity  Mild nonspecific enhancement of the extrahepatic CBD  This can be seen in the setting of cholangitis  The study was marked in [facility] for immediate notification  Workstation performed: [id number]                    Procedures  Procedures         ED Course  ED Course as of Jan 28 2126   Thu Jan 28, 2021 1916 Patient with CT findings of mass in the cecum, possible acalculous cholecystitis, possible cholangitis, will discuss with surgery  Patient with nitrates in urine as well as findings above, will give 2 g of cefepime  1958 Patient admitted to Medicine                                                MDM  Number of Diagnoses or Management Options  Diagnosis management comments: Patient is a 57-year-old female past medical history of hypertension, hyperlipidemia presenting with fatigue and transaminitis  Patient is well-appearing bedside stable vitals and in no acute distress  She has mild generalized abdominal tenderness without rebound or guarding no other significant physical exam findings  Patient shows transaminitis in the cells and from labs today  Will repeat labs, obtain CT abdomen pelvis, EKG and administer fluids  Patient has no pain at rest       Disposition  Final diagnoses:   Small bowel mass   Cholecystitis     Time reflects when diagnosis was documented in both MDM as applicable and the Disposition within this note     Time User Action Codes Description Comment    1/28/2021  7:58 PM Isabel Jean Add [K63 [id number]] Small bowel mass     1/28/2021  7:58 PM Isabel Jean Add [K81 [id number]] Cholecystitis       ED Disposition     ED Disposition Condition Date/Time Comment    Admit Stable Thu Jan 28, 2021  7:58 PM Case was discussed with Barbara Higgins and the patient's admission status was agreed to be Admission Status: inpatient status to the service of Dr Claudine Cisneros  Follow-up Information    None         Patient's Medications   Discharge Prescriptions    No medications on file     No discharge procedures on file      PDMP Review     None          ED Provider  Electronically Signed by           Edward Schwartz DO  01/28/21 0354

## 2021-01-28 NOTE — TELEPHONE ENCOUNTER
HAD  BLOOD  WORK  DONE  TODAY  PER  GREGORIO   WOULD  LIKE  A  CALL  WITH  RESULTS   CALL PT  [phone]

## 2021-01-28 NOTE — ED NOTES
Patient transported to [facility], [address] [place] [address]  01/28/21 9411

## 2021-01-29 ENCOUNTER — APPOINTMENT (INPATIENT)
Dept: MRI IMAGING | Facility: HOSPITAL | Age: 86
DRG: 375 | End: 2021-01-29
Payer: COMMERCIAL

## 2021-01-29 PROBLEM — F41.9 ANXIETY: Status: ACTIVE | Noted: 2021-01-29

## 2021-01-29 LAB
ALBUMIN SERPL BCP-MCNC: 2.2 G/DL (ref 3.5–5)
ALP SERPL-CCNC: 338 U/L (ref 46–116)
ALT SERPL W P-5'-P-CCNC: 1122 U/L (ref 12–78)
ANION GAP SERPL CALCULATED.3IONS-SCNC: 7 MMOL/L (ref 4–13)
AST SERPL W P-5'-P-CCNC: 1185 U/L (ref 5–45)
ATRIAL RATE: 82 BPM
BILIRUB SERPL-MCNC: 1.7 MG/DL (ref 0.2–1)
BUN SERPL-MCNC: 19 MG/DL (ref 5–25)
CALCIUM ALBUM COR SERPL-MCNC: 9.6 MG/DL (ref 8.3–10.1)
CALCIUM SERPL-MCNC: 8.2 MG/DL (ref 8.3–10.1)
CHLORIDE SERPL-SCNC: 108 MMOL/L (ref 100–108)
CO2 SERPL-SCNC: 23 MMOL/L (ref 21–32)
CREAT SERPL-MCNC: 0.86 MG/DL (ref 0.6–1.3)
ERYTHROCYTE [DISTWIDTH] IN BLOOD BY AUTOMATED COUNT: 15.3 % (ref 11.6–15.1)
GFR SERPL CREATININE-BSD FRML MDRD: 59 ML/MIN/1.73SQ M
GLUCOSE SERPL-MCNC: 98 MG/DL (ref 65–140)
HCT VFR BLD AUTO: 36.4 % (ref 34.8–46.1)
HGB BLD-MCNC: 11.9 G/DL (ref 11.5–15.4)
INR PPP: 1.18 (ref 0.84–1.19)
MAGNESIUM SERPL-MCNC: 1.9 MG/DL (ref 1.6–2.6)
MCH RBC QN AUTO: 29.6 PG (ref 26.8–34.3)
MCHC RBC AUTO-ENTMCNC: 32.7 G/DL (ref 31.4–37.4)
MCV RBC AUTO: 91 FL (ref 82–98)
P AXIS: 80 DEGREES
PLATELET # BLD AUTO: 222 THOUSANDS/UL (ref 149–390)
PMV BLD AUTO: 10.2 FL (ref 8.9–12.7)
POTASSIUM SERPL-SCNC: 3.7 MMOL/L (ref 3.5–5.3)
PR INTERVAL: 126 MS
PROCALCITONIN SERPL-MCNC: 0.51 NG/ML
PROT SERPL-MCNC: 7 G/DL (ref 6.4–8.2)
PROTHROMBIN TIME: 14.4 SECONDS (ref 11.6–14.5)
QRS AXIS: 17 DEGREES
QRSD INTERVAL: 100 MS
QT INTERVAL: 374 MS
QTC INTERVAL: 436 MS
RBC # BLD AUTO: 4.02 MILLION/UL (ref 3.81–5.12)
SODIUM SERPL-SCNC: 138 MMOL/L (ref 136–145)
T WAVE AXIS: 42 DEGREES
VENTRICULAR RATE: 82 BPM
WBC # BLD AUTO: 6.12 THOUSAND/UL (ref 4.31–10.16)

## 2021-01-29 PROCEDURE — A9585 GADOBUTROL INJECTION: HCPCS | Performed by: PHYSICIAN ASSISTANT

## 2021-01-29 PROCEDURE — 99232 SBSQ HOSP IP/OBS MODERATE 35: CPT | Performed by: INTERNAL MEDICINE

## 2021-01-29 PROCEDURE — 80053 COMPREHEN METABOLIC PANEL: CPT | Performed by: FAMILY MEDICINE

## 2021-01-29 PROCEDURE — 93010 ELECTROCARDIOGRAM REPORT: CPT | Performed by: INTERNAL MEDICINE

## 2021-01-29 PROCEDURE — 85027 COMPLETE CBC AUTOMATED: CPT | Performed by: FAMILY MEDICINE

## 2021-01-29 PROCEDURE — 99222 1ST HOSP IP/OBS MODERATE 55: CPT | Performed by: STUDENT IN AN ORGANIZED HEALTH CARE EDUCATION/TRAINING PROGRAM

## 2021-01-29 PROCEDURE — 85610 PROTHROMBIN TIME: CPT | Performed by: FAMILY MEDICINE

## 2021-01-29 PROCEDURE — 83735 ASSAY OF MAGNESIUM: CPT | Performed by: FAMILY MEDICINE

## 2021-01-29 PROCEDURE — G1004 CDSM NDSC: HCPCS

## 2021-01-29 PROCEDURE — 99222 1ST HOSP IP/OBS MODERATE 55: CPT | Performed by: INTERNAL MEDICINE

## 2021-01-29 PROCEDURE — 97163 PT EVAL HIGH COMPLEX 45 MIN: CPT

## 2021-01-29 PROCEDURE — 74183 MRI ABD W/O CNTR FLWD CNTR: CPT

## 2021-01-29 PROCEDURE — 97167 OT EVAL HIGH COMPLEX 60 MIN: CPT

## 2021-01-29 RX ADMIN — PIPERACILLIN AND TAZOBACTAM 3.38 G: 36; 4.5 INJECTION, POWDER, FOR SOLUTION INTRAVENOUS at 16:47

## 2021-01-29 RX ADMIN — ACETYLCYSTEINE 8430 MG: 200 INJECTION, SOLUTION INTRAVENOUS at 12:13

## 2021-01-29 RX ADMIN — HEPARIN SODIUM 5000 UNITS: 5000 INJECTION INTRAVENOUS; SUBCUTANEOUS at 22:25

## 2021-01-29 RX ADMIN — ACETYLCYSTEINE 2810 MG: 200 INJECTION, SOLUTION INTRAVENOUS at 18:06

## 2021-01-29 RX ADMIN — PIPERACILLIN AND TAZOBACTAM 3.38 G: 36; 4.5 INJECTION, POWDER, FOR SOLUTION INTRAVENOUS at 08:55

## 2021-01-29 RX ADMIN — GADOBUTROL 5 ML: 604.72 INJECTION INTRAVENOUS at 14:19

## 2021-01-29 RX ADMIN — ACETYLCYSTEINE 5620 MG: 200 INJECTION, SOLUTION INTRAVENOUS at 22:31

## 2021-01-29 RX ADMIN — SODIUM CHLORIDE 100 ML/HR: 0.9 INJECTION, SOLUTION INTRAVENOUS at 11:41

## 2021-01-29 RX ADMIN — HEPARIN SODIUM 5000 UNITS: 5000 INJECTION INTRAVENOUS; SUBCUTANEOUS at 08:52

## 2021-01-29 NOTE — ASSESSMENT & PLAN NOTE
· New finding:  Soft tissue mass in the ascending colon 3 9 x 2 1 cm  · Patient denies any blood in the stools, weight loss, loss of appetite or other B symptoms  · Will probably need colonoscopy for biopsy    GI consult requested  · Keep NPO overnight  · General surgery consult  · Family history of colon cancer and pancreatic cancer  · Last colonoscopy about 6 years ago which did not reveal any mass as endorsed by the patient and the daughter at bedside

## 2021-01-29 NOTE — PLAN OF CARE
Problem: PHYSICAL THERAPY ADULT  Goal: Performs mobility at highest level of function for planned discharge setting  See evaluation for individualized goals  Description: Treatment/Interventions: Functional transfer training, LE strengthening/ROM, Therapeutic exercise, Endurance training, Patient/family training, Equipment eval/education, Bed mobility, Gait training, Spoke to nursing, OT          See flowsheet documentation for full assessment, interventions and recommendations  Note: Prognosis: Good  Problem List: Decreased strength, Decreased endurance, Impaired balance, Decreased mobility, Impaired vision  Assessment: Pt is 80 y o  female seen for PT evaluation s/p admit to [facility] on 2021 w/ Acalculous cholecystitis  PT consulted to assess pt's functional mobility and d/c needs  Order placed for PT eval and tx, w/ up and OOB as tolerated order  Performed at least 2 patient identifiers during session: Name and   Comorbidities affecting pt's physical performance at time of assessment include: elevated blood pressure reading, anxiety, abnormal finding on urinalysis, colonic mass, macular degeneration, hyperlipidemia, esophageal reflux  PTA, pt was independent w/ all functional mobility w/ no AD vs  rollator walker, ambulates community distances, lives alone in one level apartment and retired  Personal factors affecting pt at time of IE include: ambulating w/ assistive device, inability to navigate level surfaces w/o external assistance, visual impairments, inability to perform IADLs and inability to perform ADLs  Please find objective findings from PT assessment regarding body systems outlined above with impairments and limitations including weakness, impaired balance, decreased endurance, gait deviations, decreased activity tolerance, decreased functional mobility tolerance and fall risk   The following objective measures performed on IE also reveal limitations: Barthel Index: 55/100 and Modified Osorio: 4 (moderate/severe disability)  Pt's clinical presentation is currently unstable/unpredictable seen in pt's presentation of ongoing medical management/monitoring, fatigue impacting overall mobility status and need for input for mobility technique/safety  Pt to benefit from continued PT tx to address deficits as defined above and maximize level of functional independent mobility and consistency  From PT/mobility standpoint, recommendation at time of d/c would be Home PT with family support pending progress in order to facilitate return to PLOF  Barriers to Discharge: None  Barriers to Discharge Comments: patient reports her daughter will be staying with her at discharge  PT Discharge Recommendation: Home with skilled therapy(Home PT with family support)     PT - OK to Discharge: Yes(when medically cleared)    See flowsheet documentation for full assessment

## 2021-01-29 NOTE — CONSULTS
Consultation - [facility]  Jessica Sanchez 80 y o  female MRN: [id number]  Unit/Bed#: -01 Encounter: [id number]        Inpatient consult to gastroenterology  Consult performed by: Justyn Juarez PA-C  Consult ordered by: Vonnie Otto MD          Reason for Consult / Principal Problem: Elevated LFTs, Abnormal CT    HPI: Noemi Shin is a pleasant 81 yo F with a PMH of macular degeneration, HTN, HLD, who presented yesterday due to significant weakness over the past 2 weeks, trouble sleeping, and jaundice noted over the past 48 hours  She denies associated abdominal pain, nausea, vomiting, fever, diarrhea, or constipation  She was noted to have significantly elevated LFTs on bloodwork as well as enhancement of her CBD on CT and a possible mass near her IC valve  She denies starting any new medications except mirtazapine for sleep prescribed 3-4 days ago  She has been taking about 2 g of tylenol several days per week for the past 6 months due to pain related to arthritis and a broken wrist  She denies alcohol abuse  She reports regular colonoscopies up until 6-7 years ago and they were always normal without polyps but she did have a sister with colon cancer       REVIEW OF SYSTEMS: Negative except for as stated above      Historical Information   Past Medical History:   Diagnosis Date    Hyperlipidemia     Osteoporosis     Sciatica      Past Surgical History:   Procedure Laterality Date     SECTION       Social History   Social History     Substance and Sexual Activity   Alcohol Use Not Currently     Social History     Substance and Sexual Activity   Drug Use No     Social History     Tobacco Use   Smoking Status Never Smoker   Smokeless Tobacco Never Used     Family History   Problem Relation Age of Onset    Diabetes type II Mother     Pancreatic cancer Father     Colon cancer Sister        Meds/Allergies     Medications Prior to Admission   Medication    acetaminophen (TYLENOL) 500 mg tablet    atorvastatin (LIPITOR) 20 mg tablet    cetirizine (ZyrTEC) 10 mg tablet    Cholecalciferol (CVS VITAMIN D) 2000 units CAPS    mirtazapine (REMERON) 7 5 MG tablet    Multiple Vitamins-Minerals (CENTRUM SILVER 50+WOMEN) TABS    RHOPRESSA 0 02 % SOLN    SIMBRINZA 1-0 2 % SUSP    VYZULTA 0 024 % SOLN     Current Facility-Administered Medications   Medication Dose Route Frequency    acetylcysteine (ACETADOTE) 2,810 mg in dextrose 5 % 500 mL IVPB  50 mg/kg Intravenous Once    acetylcysteine (ACETADOTE) 5,620 mg in dextrose 5 % 1,000 mL IVPB  100 mg/kg Intravenous Once    Brinzolamide-Brimonidine 1-0 2 % SUSP 1 drop  1 drop Both Eyes BID    heparin (porcine) subcutaneous injection 5,000 Units  5,000 Units Subcutaneous Q12H [facility]    Latanoprostene Bunod 0 024 % SOLN 1 drop  1 drop Both Eyes HS    Netarsudil Dimesylate 0 02 % SOLN 1 drop  1 drop Both Eyes HS    ondansetron (ZOFRAN) injection 4 mg  4 mg Intravenous Q6H PRN    piperacillin-tazobactam (ZOSYN) 3 375 g in sodium chloride 0 9 % 100 mL IVPB  3 375 g Intravenous Q8H    sodium chloride 0 9 % infusion  100 mL/hr Intravenous Continuous       No Known Allergies        Objective     Blood pressure 110/56, pulse 69, temperature 98 2 °F (36 8 °C), temperature source Oral, resp  rate 18, height 4' 11" (1 499 m), weight 56 2 kg (123 lb 14 4 oz), SpO2 92 %  Intake/Output Summary (Last 24 hours) at 1/29/2021 1327  Last data filed at 1/29/2021 0934  Gross per 24 hour   Intake 1050 ml   Output 50 ml   Net 1000 ml         PHYSICAL EXAM:      General Appearance:   Alert, oriented x3, nontoxic appearing   HEENT:   Normocephalic, atraumatic, (+) scleral icterus    Neck:  Supple, symmetrical, trachea midline   Lungs:   Clear to auscultation bilaterally; no rales, rhonchi or wheezing; respirations unlabored    Heart[de-identified]   S1 and S2 normal; regular rate and rhythm; no murmur, rub, or gallop     Abdomen:   Non-distended, BS active, soft, (+) mild LLQ TTP  Rectal:   Deferred    Extremities:  No cyanosis, clubbing or edema    Pulses:  2+ and symmetric all extremities    Skin:  No jaundice or pallor, no rashes or lesions      Lab Results:   Results from last 7 days   Lab Units 01/29/21  0451 01/28/21  1759   WBC Thousand/uL 6 12 6 69   HEMOGLOBIN g/dL 11 9 13 6   HEMATOCRIT % 36 4 42 0   PLATELETS Thousands/uL 222 260   NEUTROS PCT %  --  50   LYMPHS PCT %  --  29   MONOS PCT %  --  15*   EOS PCT %  --  5     Results from last 7 days   Lab Units 01/29/21  0451   POTASSIUM mmol/L 3 7   CHLORIDE mmol/L 108   CO2 mmol/L 23   BUN mg/dL 19   CREATININE mg/dL 0 86   CALCIUM mg/dL 8 2*   ALK PHOS U/L 338*   ALT U/L 1,122*   AST U/L 1,185*     Results from last 7 days   Lab Units 01/29/21  0451   INR  1 18     Results from last 7 days   Lab Units 01/28/21  1759   LIPASE u/L 204       Imaging Studies: I have personally reviewed pertinent imaging studies  Ct Abdomen Pelvis With Contrast  Result Date: 1/28/2021  Impression: Soft tissue mass in the ascending colon just above the ileocecal valve consistent with neoplasm measuring 3 9 x 2 1 cm  Multiple small mesenteric nodes are noted adjacent to the mass seen on image 601/65  Recommend colonoscopy  Pericholecystic fluid  No calcified gallstones  Possible acalculous cholecystitis  Correlate clinically  Consider ultrasound if any clinical ambiguity  Mild nonspecific enhancement of the extrahepatic CBD  This can be seen in the setting of cholangitis  The study was marked in [facility] for immediate notification          ASSESSMENT and PLAN:      Generalized Fatigue  Elevated LFTs  Jaundice  - She is generally healthy presenting with 2 weeks of generalized fatigue/weakness and jaundice noted over the past 48 hours found to have elevated LFTs, primarily a significant transaminitis  - CT on admission showed nonspecific enhancement of the extrahepatic CBD but she does not have typical biliary symptoms; will obtain MRI/MRCP for further biliary imaging  - Concern for possible tylenol toxicity due to her reported chronic tylenol use of 2 g several days per week for the past 6 months; no tylenol level obtained on admission and this will not be helpful to obtain now, will give NAC course empirically  - Check acute viral hepatitis panel, JACINTA, anti-smooth muscle ab, EBV panel  - Continue zosyn for now for cholangitis coverage but low suspicion for this given no fever or leukocytosis  - Surgical team consulted due to abnormal GB appearance on imaging though her symptoms are not consistent with a biliary etiology  - Monitor CMP/INR daily  - Supportive care  - Light diet as tolerated, NPO after midnight tentatively for ERCP pending MRCP findings    Colonic Mass  Abnormal CT  - CT on admission incidentally noted a soft tissue mass in the ascending colon just above the ileocecal valve consistent with possible neoplasm  - She reports normal routine colonoscopies up until 6-7 years ago; has a sister with a history of colon cancer  - Will address above issue first and then plan for colonoscopy for further evaluation, likely colonoscopy early next week      The patient will be seen and examined by Dr Tiana Whelan

## 2021-01-29 NOTE — CASE MANAGEMENT
CM spoke to daughter in [address] per pt request   Pt lives alone in a 2nd floor senior housing apt complex in [place]  An elevator is present to reach her apt  She is independent with ADL's  She has a walker, but rarely uses it  She has never been in rehab or used [facility] services  Denies substance abuse, tobacco abuse, or mental health issues  Dr Dean Ott is her PCP  She uses [employer] who delivers meds to her apt  She has no problem with co-pays  She has an Advanced Directive and her POA is nan Mustafa  She does not work or drive  Pt is legally blind and gets transported to appointments by [facility] or a family member  CM discussed d/c needs and PT recommendation of VNA services and this is acceptable to both pt and dil  Understands that it is their preference  CM will place referrals and will keep pt and family informed of acceptances  CM department will continue to follow through hospitalization  CM reviewed discharge planning process including the following: identifying help at home, patient preference for discharge planning needs, pharmacy preference, and availability of treatment team to discuss questions or concerns patient and/or family may have regarding understanding medications and recognizing signs and symptoms once discharged  CM also encouraged patient to follow up with all recommended appointments after discharge  Patient advised of importance for patient and family to participate in managing patients medical well being

## 2021-01-29 NOTE — PROGRESS NOTES
Progress Note - Zeyad Sesay Cuda 80 y o  female MRN: [id number]  Unit/Bed#: -01 Encounter: [id number]    Subjective:   Cabrera Butler is feeling okay this morning  She is anxious and afraid  She did not sleep well last night thinking about things  She is not having any abdominal pain, nausea or vomiting  She has not had a bowel movement in 2 days  She denies any fevers or chills  He is not having any chest pain or shortness of breath  All other ROS are negative  Objective:   Vitals: Blood pressure 110/56, pulse 69, temperature 98 2 °F (36 8 °C), temperature source Oral, resp  rate 18, height 4' 11" (1 499 m), weight 56 2 kg (123 lb 14 4 oz), SpO2 92 %  ,Body mass index is 25 02 kg/m²  SPO2 RA Rest      ED to Hosp-Admission (Current) from 1/28/2021 in [address] [place] [address] 4th Floor Med Surg Unit   SpO2  92 %   SpO2 Activity  At Rest   O2 Device  None (Room air)   O2 Flow Rate          I&O:     Intake/Output Summary (Last 24 hours) at 1/29/2021 0955  Last data filed at 1/29/2021 0934  Gross per 24 hour   Intake 1050 ml   Output 50 ml   Net 1000 ml         Physical Exam:       General Appearance:    Alert, cooperative, no distress   Head:    Normocephalic, without obvious abnormality, atraumatic   Eyes:    PERRL, conjunctiva/corneas clear, EOM's intact       Nose:   Moist mucous membranes, no drainage or sinus tenderness   Throat:   No tenderness, no exudates   Neck:   Supple, symmetrical, trachea midline, no JVD   Lungs:     Clear to auscultation bilaterally, respirations unlabored   Heart:    Regular rate and rhythm, S1 and S2 normal, no murmur, rub   or gallop   Abdomen: Soft, minimal mid epigastric tenderness, positive bowel sounds, no masses, no organomegaly   Extremities:  No pedal edema, calf tenderness  Distal pulses palpable  Neurologic:     CNII-XII intact        Invasive Devices     Peripheral Intravenous Line            Peripheral IV 01/28/21 Left Antecubital less than 1 day Social History  reviewed  Family History   Problem Relation Age of Onset    Diabetes type II Mother     Pancreatic cancer Father     Colon cancer Sister     reviewed    Meds:  Current Facility-Administered Medications   Medication Dose Route Frequency Provider Last Rate Last Admin    Brinzolamide-Brimonidine 1-0 2 % SUSP 1 drop  1 drop Both Eyes BID Torito Nelson MD   1 drop at 01/29/21 0853    heparin (porcine) subcutaneous injection 5,000 Units  5,000 Units Subcutaneous Q12H [facility] Torito Nelson MD   5,000 Units at 01/29/21 0852    Latanoprostene Bunod 0 024 % SOLN 1 drop  1 drop Both Eyes HS Torito Nelson MD        Netarsudil Dimesylate 0 02 % SOLN 1 drop  1 drop Both Eyes HS Torito Nelson MD        ondansetron [facility] injection 4 mg  4 mg Intravenous Q6H PRN Torito Nelson MD        piperacillin-tazobactam (ZOSYN) 3 375 g in sodium chloride 0 9 % 100 mL IVPB  3 375 g Intravenous Q8H Torito Nelson  mL/hr at 01/29/21 0855 3 375 g at 01/29/21 0855    sodium chloride 0 9 % infusion  100 mL/hr Intravenous Continuous Torito Nelson  mL/hr at 01/28/21 2346 100 mL/hr at 01/28/21 2346      Medications Prior to Admission   Medication    acetaminophen (TYLENOL) 500 mg tablet    atorvastatin (LIPITOR) 20 mg tablet    cetirizine (ZyrTEC) 10 mg tablet    Cholecalciferol ([employer] VITAMIN D) 2000 units CAPS    mirtazapine (REMERON) 7 5 MG tablet    Multiple Vitamins-Minerals (CENTRUM SILVER 50+WOMEN) TABS    RHOPRESSA 0 02 % SOLN    SIMBRINZA 1-0 2 % SUSP    VYZULTA 0 024 % SOLN       Labs:  Results from last 7 days   Lab Units 01/29/21  0451 01/28/21  1759 01/28/21  0933   WBC Thousand/uL 6 12 6 69 6 29   HEMOGLOBIN g/dL 11 9 13 6 14 4   HEMATOCRIT % 36 4 42 0 44 8   PLATELETS Thousands/uL 222 260 275   NEUTROS PCT %  --  50 57   LYMPHS PCT %  --  29 25   MONOS PCT %  --  15* 12   EOS PCT %  --  5 5     Results from last 7 days   Lab Units 01/29/21  0451 01/28/21  1759 01/28/21  0933   POTASSIUM mmol/L 3 7 4 1 4 1 CHLORIDE mmol/L 108 105 109*   CO2 mmol/L 23 26 31   BUN mg/dL 19 27* 21   CREATININE mg/dL 0 86 1 05 1 00   CALCIUM mg/dL 8 2* 9 0 9 6   ALK PHOS U/L 338* 404* 423*   ALT U/L 1,122* 1,448* 1,551*   AST U/L 1,185* 1,504* 1,654*     Lab Results   Component Value Date    TROPONINI <0 02 01/28/2021     Results from last 7 days   Lab Units 01/29/21  0451 01/28/21  1759   INR  1 18 1 13     Lab Results   Component Value Date    URINECX <10,000 cfu/ml  10/28/2019       Imaging:  No results found for this or any previous visit  No results found for this or any previous visit  VTE Pharmacologic Prophylaxis: Heparin      Code Status:   Level 3 - DNAR and DNI    Assessment:  Principal Problem:    Acalculous cholecystitis  Active Problems:    Esophageal reflux    Hyperlipidemia    Macular degeneration    Colonic mass    Abnormal finding on urinalysis    Elevated blood pressure reading    Anxiety  Resolved Problems:    * No resolved hospital problems  *      Plan:  Elevated transaminases-etiology is unclear, given CT findings there was some concern for acalculous cholecystitis versus cholangitis, versus cholangiocarcinoma  Cannot rule out acute infectious hepatitis versus autoimmune  Case discussed with GI  Plan for MRCP today, ERCP as needed  Continue Zosyn for now  Soft tissue mass of the ascending colon with pericolonic adenopathy concerning for colon cancer  She has family history of same  We discussed workup including colonoscopy and possible treatment options to include chemotherapy, radiation and surgery  Patient is interested in pursuing diagnosis and weighing the treatment options accordingly  Anxiety-mirtazapine was started in outpatient setting, will continue    Hyperlipidemia-hold statin based upon elevated transaminases    Macular degeneration-continue outpatient treatment    Hypertension on admission likely related to anxiety    Will continue to follow    Abnormal urinalysis likely related to bilirubin urea    Disposition-patient is not stable for discharge  Workup as above  Daughter Colette Galaviz updated by phone      Nunu Howe MD  1/29/2021,9:55 AM

## 2021-01-29 NOTE — ASSESSMENT & PLAN NOTE
· Associated right upper quadrant tenderness, elevated bilirubin, jaundice  · Check direct bilirubin, elevated alk-phos  · No fever or leukocytosis or other signs of systemic involvement at this time  · CT scan does show mildly dilated intrahepatic CBD and enhancement of extrahepatic CBD  · CT scan also shows concern of cholangitis but the patient does not meet all criteria [facility]  Will check CRP, procalcitonin and lipase levels to rule out the other differential diagnoses  · GI consultation sought  · General surgery consulted for the possibility of cholecystectomy  · Got cefepime in the ER  · We will start on Zosyn    Follow-up blood cultures to tailor antibiotics

## 2021-01-29 NOTE — OCCUPATIONAL THERAPY NOTE
Occupational Therapy Evaluation      Jean Carlos Dixon Cuda    2021    Patient Active Problem List   Diagnosis    Esophageal reflux    Hyperlipidemia    Hypertension    Impaired fasting glucose    Macular degeneration    Osteoporosis    Vitamin D deficiency    Family history of colon cancer    Myalgia    Benign skin lesion    Colonic mass    Acalculous cholecystitis    Abnormal finding on urinalysis    Elevated blood pressure reading    Anxiety       Past Medical History:   Diagnosis Date    Hyperlipidemia     Osteoporosis     Sciatica        Past Surgical History:   Procedure Laterality Date     SECTION          21 1129   OT Last Visit   OT Visit Date 21   Note Type   Note type Evaluation   Restrictions/Precautions   Weight Bearing Precautions Per Order No   Braces or Orthoses   (none at baseline)   Other Precautions Visual impairment; Bed Alarm; Chair Alarm;Multiple lines; Fall Risk   Pain Assessment   Pain Assessment Tool Pain Assessment not indicated - pt denies pain   Pain Score No Pain   Home Living   Type of Home Apartment  (2nd floor apt  )   Home Layout One level;Performs ADLs on one level; Able to live on main level with bedroom/bathroom; Access; Elevator   Bathroom Shower/Tub   (Walk-in tub/shower unit)   Bathroom Toilet Raised   Bathroom Equipment Shower chair;Grab bars in shower   [facility]  (utilizes rollator PRN )   Additional Comments patient reports her daughter will be staying with her at discharge   Prior Function   Level of [place] Independent with ADLs and functional mobility   Lives With Alone   Receives Help From Family   ADL Assistance Independent   IADLs Independent   Falls in the last 6 months 0   Vocational Retired   Comments (-) driving, DIL assists with transportation    Lifestyle   Autonomy Patient reporting being independent with ADLs/IADLs, ambulatory with no AD and lives alone in a one level apartment Reciprocal Relationships supportive daughter and DIL    Service to Others retired    Intrinsic Gratification enjoys reading, listening to music and baking bread    ADL   Eating Assistance 5  [address]  [place]  5  Supervision/Setup   Bed Mobility   Additional Comments DNT bed mobility: pt seated OOB in recliner upon arrival to room and seated in recliner at end of eval     Transfers   Sit to Stand 5  Supervision   Additional items Assist x 1; Armrests; Increased time required;Verbal cues   Stand to Sit 5  Supervision   Additional items Assist x 1; Increased time required;Verbal cues;Armrests   Functional Mobility   Functional Mobility 5  Supervision   Additional Comments Pt ambulated in room with no LOB or SOB    Additional items Rolling walker   Balance   Static Sitting Good   Dynamic Sitting Fair +   Static Standing Fair   Dynamic Standing Fair -   Activity Tolerance   Activity Tolerance Patient limited by fatigue   Medical Staff Made Aware PT Chasity    Nurse Made Aware PRICE Knutson verbalized pt appropriate for therapy and made aware of outcomes    RUE Assessment   RUE Assessment WFL  (4-/5 based on functional assessment)   LUE Assessment   LUE Assessment WFL  (4-/5 based on functional assessment)   Hand Function   Gross Motor Coordination Functional   Fine Motor Coordination Functional   Sensation   Light Touch No apparent deficits  (per pt )   Vision-Basic Assessment   Current Vision Does not wear glasses   Patient Visual Report Other (Comment)  (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)   Vision - Complex Assessment   Acuity   (Unable to see face or BP machine )   Cognition   Overall Cognitive Status [facility]   Arousal/Participation Alert; Responsive; Cooperative   Attention Within functional limits   Orientation Level Oriented X4   Memory Decreased short term memory   Following Commands Follows all commands and directions without difficulty   Comments Pt agreeable to OT eval    Assessment   Limitation Decreased ADL status; Decreased UE strength;Decreased endurance;Decreased self-care trans; Visual deficit; Decreased high-level ADLs   Prognosis Good   Assessment Patient is a 80 y o  female seen for OT evaluation s/p admit to [id number] [facility]  on 1/28/2021 w/Acalculous cholecystitis  Commorbidities affecting patient's functional performance at time of assessment include: anxiety, colonic mass, esophageal reflux, HLD and macular degeneration  Orders placed for OT evaluation and treatment and up and OOB as tolerated   Performed at least two patient identifiers during session including name and wristband  Prior to admission, Patient reporting being independent with ADLs/IADLs, ambulatory with no AD and lives alone in a one level apartment  Personal factors affecting patient at time of initial evaluation include: difficulty performing ADLs and difficulty performing IADLs  Upon evaluation, patient requires supervision assist for UB ADLs, minimal  assist for LB ADLs, transfers and functional ambulation in room and bathroom with supervision assist, with the use of Rolling Walker  Patient is oriented x 4 and presents with ability to recognize a problem, define a problem, identify alternative plan, select a plan, organize steps in a plan, implement plan and evaluate outcome (problem solving)    Occupational performance is affected by the following deficits: visual deficit, decreased muscle strength, dynamic sit/ stand balance deficit with poor standing tolerance time for self care and functional mobility, decreased activity tolerance, delayed righting and equilibrium reactions and postural control and postural alignment deficit, requiring external assistance to complete transitional movements  Patient to benefit from continued Occupational Therapy treatment while in the hospital to address deficits as defined above and maximize level of functional independence with ADLs and functional mobility  Occupational Performance areas to address include: grooming , bathing/ shower, dressing, toilet hygiene, transfer to all surfaces, functional ambulation, medication routine/ management, IADLS: Household maintenance, IADLs: safety procedures and IADLs: meal prep/ clean up  From OT standpoint, recommendation at time of d/c would be Home with skilled therapy  Goals   Patient Goals "to get my energy back"   Plan   Treatment Interventions ADL retraining;Visual perceptual retraining;Functional transfer training;UE strengthening/ROM; Endurance training;Patient/family training; Compensatory technique education; Activityengagement   Goal Expiration Date 02/08/21   OT Treatment Day 0   OT Frequency 3-5x/wk   Recommendation   OT Discharge Recommendation Home with skilled therapy   OT - OK to Discharge Yes  (Once medically cleared )   AM-PAC Daily Activity Inpatient   Lower Body Dressing 3   Bathing 3   Toileting 3   Upper Body Dressing 3   Grooming 4   Eating 4   Daily Activity Raw Score 20   Daily Activity Standardized Score (Calc for Raw Score >=11) 42 03   AM-PAC Applied Cognition Inpatient   Following a Speech/Presentation 3   Understanding Ordinary Conversation 3   Taking Medications 3   Remembering Where Things Are Placed or Put Away 3   Remembering List of 4-5 Errands 3   Taking Care of Complicated Tasks 3   Applied Cognition Raw Score 18   Applied Cognition Standardized Score 38 07   Barthel Index   Feeding 10   Bathing 0   Grooming Score 5   Dressing Score 5   Bladder Score 10   Bowels Score 10   Toilet Use Score 5   Transfers (Bed/Chair) Score 10   Mobility (Level Surface) Score 0   Stairs Score 0   Barthel Index Score 55     GOALS:    *ADL transfers with (I) for inc'd independence with ADLs/purposeful tasks    *UB ADL with (I) for inc'd independence with self cares    *LB ADL with (I) using AE prn for inc'd independence with self cares    *Toileting with (I) for clothing management and hygiene for return to PLOF with personal care    *Increase stand tolerance x8 m for inc'd tolerance with standing purposeful tasks    *Participate in 10m UE therex to increase overall stamina/activity tolerance for purposeful tasks    *Bed mobility- (I) for inc'd independence to manage own comfort and initiate EOB & OOB purposeful tasks    *Patient will verbalize 3 safety awareness/ principles to prevent falls in the home setting  *Patient will increase OOB/sitting tolerance to 2-4 hours per day to increase participation in self-care and leisure tasks with no s/s of exertion           Sangita Celis MS, OTR/L

## 2021-01-29 NOTE — PLAN OF CARE
Problem: Potential for Falls  Goal: Patient will remain free of falls  Description: INTERVENTIONS:  - Assess patient frequently for physical needs  -  Identify cognitive and physical deficits and behaviors that affect risk of falls    -  [place] fall precautions as indicated by assessment   - Educate patient/family on patient safety including physical limitations  - Instruct patient to call for assistance with activity based on assessment  - Modify environment to reduce risk of injury  - Consider OT/PT consult to assist with strengthening/mobility  Outcome: Progressing     Problem: PAIN - ADULT  Goal: Verbalizes/displays adequate comfort level or baseline comfort level  Description: Interventions:  - Encourage patient to monitor pain and request assistance  - Assess pain using appropriate pain scale  - Administer analgesics based on type and severity of pain and evaluate response  - Implement non-pharmacological measures as appropriate and evaluate response  - Consider cultural and social influences on pain and pain management  - Notify physician/advanced practitioner if interventions unsuccessful or patient reports new pain  Outcome: Progressing     Problem: INFECTION - ADULT  Goal: Absence or prevention of progression during hospitalization  Description: INTERVENTIONS:  - Assess and monitor for signs and symptoms of infection  - Monitor lab/diagnostic results  - Monitor all insertion sites, i e  indwelling lines, tubes, and drains  - Monitor endotracheal if appropriate and nasal secretions for changes in amount and color  - [place] appropriate cooling/warming therapies per order  - Administer medications as ordered  - Instruct and encourage patient and family to use good hand hygiene technique  - Identify and instruct in appropriate isolation precautions for identified infection/condition  Outcome: Progressing  Goal: Absence of fever/infection during neutropenic period  Description: INTERVENTIONS:  - Monitor WBC    Outcome: Progressing     Problem: SAFETY ADULT  Goal: Patient will remain free of falls  Description: INTERVENTIONS:  - Assess patient frequently for physical needs  -  Identify cognitive and physical deficits and behaviors that affect risk of falls    -  [place] fall precautions as indicated by assessment   - Educate patient/family on patient safety including physical limitations  - Instruct patient to call for assistance with activity based on assessment  - Modify environment to reduce risk of injury  - Consider OT/PT consult to assist with strengthening/mobility  Outcome: Progressing  Goal: Maintain or return to baseline ADL function  Description: INTERVENTIONS:  -  Assess patient's ability to carry out ADLs; assess patient's baseline for ADL function and identify physical deficits which impact ability to perform ADLs (bathing, care of mouth/teeth, toileting, grooming, dressing, etc )  - Assess/evaluate cause of self-care deficits   - Assess range of motion  - Assess patient's mobility; develop plan if impaired  - Assess patient's need for assistive devices and provide as appropriate  - Encourage maximum independence but intervene and supervise when necessary  - Involve family in performance of ADLs  - Assess for home care needs following discharge   - Consider OT consult to assist with ADL evaluation and planning for discharge  - Provide patient education as appropriate  Outcome: Progressing  Goal: Maintain or return mobility status to optimal level  Description: INTERVENTIONS:  - Assess patient's baseline mobility status (ambulation, transfers, stairs, etc )    - Identify cognitive and physical deficits and behaviors that affect mobility  - Identify mobility aids required to assist with transfers and/or ambulation (gait belt, sit-to-stand, lift, walker, cane, etc )  - [place] fall precautions as indicated by assessment  - Record patient progress and toleration of activity level on Mobility SBAR; progress patient to next Phase/Stage  - Instruct patient to call for assistance with activity based on assessment  - Consider rehabilitation consult to assist with strengthening/weightbearing, etc   Outcome: Progressing     Problem: DISCHARGE PLANNING  Goal: Discharge to home or other facility with appropriate resources  Description: INTERVENTIONS:  - Identify barriers to discharge w/patient and caregiver  - Arrange for needed discharge resources and transportation as appropriate  - Identify discharge learning needs (meds, wound care, etc )  - Arrange for interpretive services to assist at discharge as needed  - Refer to Case Management Department for coordinating discharge planning if the patient needs post-hospital services based on physician/advanced practitioner order or complex needs related to functional status, cognitive ability, or social support system  Outcome: Progressing     Problem: Knowledge Deficit  Goal: Patient/family/caregiver demonstrates understanding of disease process, treatment plan, medications, and discharge instructions  Description: Complete learning assessment and assess knowledge base    Interventions:  - Provide teaching at level of understanding  - Provide teaching via preferred learning methods  Outcome: Progressing

## 2021-01-29 NOTE — PLAN OF CARE
Problem: OCCUPATIONAL THERAPY ADULT  Goal: Performs self-care activities at highest level of function for planned discharge setting  See evaluation for individualized goals  Description: Treatment Interventions: ADL retraining, Visual perceptual retraining, Functional transfer training, UE strengthening/ROM, Endurance training, Patient/family training, Compensatory technique education, Activityengagement          See flowsheet documentation for full assessment, interventions and recommendations  Note: Limitation: Decreased ADL status, Decreased UE strength, Decreased endurance, Decreased self-care trans, Visual deficit, Decreased high-level ADLs  Prognosis: Good  Assessment: Patient is a 80 y o  female seen for OT evaluation s/p admit to [id number] [place] [address]  on 1/28/2021 w/Acalculous cholecystitis  Commorbidities affecting patient's functional performance at time of assessment include: anxiety, colonic mass, esophageal reflux, HLD and macular degeneration  Orders placed for OT evaluation and treatment and up and OOB as tolerated   Performed at least two patient identifiers during session including name and wristband  Prior to admission, Patient reporting being independent with ADLs/IADLs, ambulatory with no AD and lives alone in a one level apartment  Personal factors affecting patient at time of initial evaluation include: difficulty performing ADLs and difficulty performing IADLs  Upon evaluation, patient requires supervision assist for UB ADLs, minimal  assist for LB ADLs, transfers and functional ambulation in room and bathroom with supervision assist, with the use of Rolling Walker  Patient is oriented x 4 and presents with ability to recognize a problem, define a problem, identify alternative plan, select a plan, organize steps in a plan, implement plan and evaluate outcome (problem solving)    Occupational performance is affected by the following deficits: visual deficit, decreased muscle strength, dynamic sit/ stand balance deficit with poor standing tolerance time for self care and functional mobility, decreased activity tolerance, delayed righting and equilibrium reactions and postural control and postural alignment deficit, requiring external assistance to complete transitional movements  Patient to benefit from continued Occupational Therapy treatment while in the hospital to address deficits as defined above and maximize level of functional independence with ADLs and functional mobility  Occupational Performance areas to address include: grooming , bathing/ shower, dressing, toilet hygiene, transfer to all surfaces, functional ambulation, medication routine/ management, IADLS: Household maintenance, IADLs: safety procedures and IADLs: meal prep/ clean up  From OT standpoint, recommendation at time of d/c would be Home with skilled therapy         OT Discharge Recommendation: Home with skilled therapy  OT - OK to Discharge: Yes(Once medically cleared )     Norman Haddad OT

## 2021-01-29 NOTE — ASSESSMENT & PLAN NOTE
· Indicative of UTI    Patient does not have any UTI symptoms  · Follow urine cultures-as this could be asymptomatic bacteriuria in an immunocompromised patient which would warrant treatment with antibiotics

## 2021-01-29 NOTE — PLAN OF CARE
Problem: Potential for Falls  Goal: Patient will remain free of falls  Description: INTERVENTIONS:  - Assess patient frequently for physical needs  -  Identify cognitive and physical deficits and behaviors that affect risk of falls    -  [place] fall precautions as indicated by assessment   - Educate patient/family on patient safety including physical limitations  - Instruct patient to call for assistance with activity based on assessment  - Modify environment to reduce risk of injury  - Consider OT/PT consult to assist with strengthening/mobility  Outcome: Progressing     Problem: PAIN - ADULT  Goal: Verbalizes/displays adequate comfort level or baseline comfort level  Description: Interventions:  - Encourage patient to monitor pain and request assistance  - Assess pain using appropriate pain scale  - Administer analgesics based on type and severity of pain and evaluate response  - Implement non-pharmacological measures as appropriate and evaluate response  - Consider cultural and social influences on pain and pain management  - Notify physician/advanced practitioner if interventions unsuccessful or patient reports new pain  Outcome: Progressing     Problem: INFECTION - ADULT  Goal: Absence or prevention of progression during hospitalization  Description: INTERVENTIONS:  - Assess and monitor for signs and symptoms of infection  - Monitor lab/diagnostic results  - Monitor all insertion sites, i e  indwelling lines, tubes, and drains  - Monitor endotracheal if appropriate and nasal secretions for changes in amount and color  - [place] appropriate cooling/warming therapies per order  - Administer medications as ordered  - Instruct and encourage patient and family to use good hand hygiene technique  - Identify and instruct in appropriate isolation precautions for identified infection/condition  Outcome: Progressing  Goal: Absence of fever/infection during neutropenic period  Description: INTERVENTIONS:  - Monitor WBC    Outcome: Progressing     Problem: SAFETY ADULT  Goal: Patient will remain free of falls  Description: INTERVENTIONS:  - Assess patient frequently for physical needs  -  Identify cognitive and physical deficits and behaviors that affect risk of falls    -  [place] fall precautions as indicated by assessment   - Educate patient/family on patient safety including physical limitations  - Instruct patient to call for assistance with activity based on assessment  - Modify environment to reduce risk of injury  - Consider OT/PT consult to assist with strengthening/mobility  Outcome: Progressing  Goal: Maintain or return to baseline ADL function  Description: INTERVENTIONS:  -  Assess patient's ability to carry out ADLs; assess patient's baseline for ADL function and identify physical deficits which impact ability to perform ADLs (bathing, care of mouth/teeth, toileting, grooming, dressing, etc )  - Assess/evaluate cause of self-care deficits   - Assess range of motion  - Assess patient's mobility; develop plan if impaired  - Assess patient's need for assistive devices and provide as appropriate  - Encourage maximum independence but intervene and supervise when necessary  - Involve family in performance of ADLs  - Assess for home care needs following discharge   - Consider OT consult to assist with ADL evaluation and planning for discharge  - Provide patient education as appropriate  Outcome: Progressing  Goal: Maintain or return mobility status to optimal level  Description: INTERVENTIONS:  - Assess patient's baseline mobility status (ambulation, transfers, stairs, etc )    - Identify cognitive and physical deficits and behaviors that affect mobility  - Identify mobility aids required to assist with transfers and/or ambulation (gait belt, sit-to-stand, lift, walker, cane, etc )  - [place] fall precautions as indicated by assessment  - Record patient progress and toleration of activity level on Mobility SBAR; progress patient to next Phase/Stage  - Instruct patient to call for assistance with activity based on assessment  - Consider rehabilitation consult to assist with strengthening/weightbearing, etc   Outcome: Progressing     Problem: DISCHARGE PLANNING  Goal: Discharge to home or other facility with appropriate resources  Description: INTERVENTIONS:  - Identify barriers to discharge w/patient and caregiver  - Arrange for needed discharge resources and transportation as appropriate  - Identify discharge learning needs (meds, wound care, etc )  - Arrange for interpretive services to assist at discharge as needed  - Refer to Case Management Department for coordinating discharge planning if the patient needs post-hospital services based on physician/advanced practitioner order or complex needs related to functional status, cognitive ability, or social support system  Outcome: Progressing     Problem: Knowledge Deficit  Goal: Patient/family/caregiver demonstrates understanding of disease process, treatment plan, medications, and discharge instructions  Description: Complete learning assessment and assess knowledge base    Interventions:  - Provide teaching at level of understanding  - Provide teaching via preferred learning methods  Outcome: Progressing

## 2021-01-29 NOTE — ASSESSMENT & PLAN NOTE
· Mention of hypertension in the chart but patient is not on any antihypertensive medications  · I am not starting any medications at this time    Continue to monitor blood pressure closely and if consistently elevated none will start on antihypertensives

## 2021-01-29 NOTE — PHYSICAL THERAPY NOTE
Physical Therapy Evaluation     Patient's Name: Sunita Eubanks    Admitting Diagnosis  Cholecystitis [K81 9]  Abnormal gait [R26 9]  Fatigue [R53 83]  Small bowel mass [K63 89]    Problem List  Patient Active Problem List   Diagnosis    Esophageal reflux    Hyperlipidemia    Hypertension    Impaired fasting glucose    Macular degeneration    Osteoporosis    Vitamin D deficiency    Family history of colon cancer    Myalgia    Benign skin lesion    Colonic mass    Acalculous cholecystitis    Abnormal finding on urinalysis    Elevated blood pressure reading    Anxiety       Past Medical History  Past Medical History:   Diagnosis Date    Hyperlipidemia     Osteoporosis     Sciatica        Past Surgical History  Past Surgical History:   Procedure Laterality Date     SECTION              21 1112   PT Last Visit   PT Visit Date 21   Note Type   Note type Evaluation   Pain Assessment   Pain Assessment Tool Pain Assessment not indicated - pt denies pain   Pain Score No Pain   Home Living   Type of Home Apartment   Home Layout Elevator; One level;Performs ADLs on one level; Able to live on main level with bedroom/bathroom   Bathroom Shower/Tub   (Walk-in tub/shower unit)   Bathroom Toilet Raised   Bathroom Equipment Shower chair;Grab bars in [facility]  (rollator walker for community ambulation)   Additional Comments patient reports her daughter will be staying with her at discharge   Prior Function   Level of [place] Independent with ADLs and functional mobility   Lives With Alone   Receives Help From Family   ADL Assistance Independent   IADLs Independent   Falls in the last 6 months 0   Vocational Retired   Comments daughter-in-law assists with grocery shopping   Restrictions/Precautions   [place] Precautions Per Order No   Braces or Orthoses   (none at baseline)   Other Precautions Visual impairment; Bed Alarm; Chair Alarm; Fall Risk;Multiple lines   General   Family/Caregiver Present No   Cognition   Overall Cognitive Status WFL   Arousal/Participation Cooperative   Orientation Level Oriented X4   Memory Decreased short term memory   Following Commands Follows all commands and directions without difficulty   Comments patient agreeable to PT eval   RUE Assessment   RUE Assessment WFL  (based on functional assessment)   LUE Assessment   LUE Assessment WFL  (based on functional assessment)   RLE Assessment   RLE Assessment WFL  (grossly assessed with functional mobility: at least 3+/5)   LLE Assessment   LLE Assessment WFL  (grossly assessed with functional mobility: at least 3+/5)   Coordination   Movements are Fluid and Coordinated 1   Sensation [facility]   Light Touch   RLE Light Touch Grossly intact   LLE Light Touch Grossly intact   Bed Mobility   Additional Comments patient seated OOB in recliner chair upon arrival   Transfers   Sit to Stand 5  Supervision   Additional items Assist x 1; Increased time required;Verbal cues;Armrests   Stand to Sit 5  Supervision   Additional items Assist x 1; Increased time required;Verbal cues;Armrests   Ambulation/Elevation   Gait pattern Decreased foot clearance; Excessively slow; Short stride; Improper Weight shift   Gait Assistance 4  Minimal assist  (min A without AD; CGA with RW)   Additional items Assist x 1;Verbal cues   Assistive Device Rolling walker;None   Distance 20' x 2  (20' without AD + 20' with RW)   Stair Management Assistance Not tested   Balance   Static Sitting Good   Dynamic Sitting Fair +   Static Standing Fair   Dynamic Standing Fair -   Ambulatory Fair -   Endurance Deficit   Endurance Deficit Yes   Activity Tolerance   Activity Tolerance Patient limited by fatigue   Medical Staff [address]   Nurse Made Aware PRICE Mckenzie confirmed patient appropriate for therapy   Assessment   Prognosis Good   Problem List Decreased strength;Decreased endurance; Impaired balance;Decreased mobility; Impaired vision   Assessment Pt is 80 y o  female seen for PT evaluation s/p admit to [facility] on 2021 w/ Acalculous cholecystitis  PT consulted to assess pt's functional mobility and d/c needs  Order placed for PT eval and tx, w/ up and OOB as tolerated order  Performed at least 2 patient identifiers during session: Name and   Comorbidities affecting pt's physical performance at time of assessment include: elevated blood pressure reading, anxiety, abnormal finding on urinalysis, colonic mass, macular degeneration, hyperlipidemia, esophageal reflux  PTA, pt was independent w/ all functional mobility w/ no AD vs  rollator walker, ambulates community distances, lives alone in one level apartment and retired  Personal factors affecting pt at time of IE include: ambulating w/ assistive device, inability to navigate level surfaces w/o external assistance, visual impairments, inability to perform IADLs and inability to perform ADLs  Please find objective findings from PT assessment regarding body systems outlined above with impairments and limitations including weakness, impaired balance, decreased endurance, gait deviations, decreased activity tolerance, decreased functional mobility tolerance and fall risk  The following objective measures performed on IE also reveal limitations: Barthel Index: 55/100 and Modified [place]: 4 (moderate/severe disability)  Pt's clinical presentation is currently unstable/unpredictable seen in pt's presentation of ongoing medical management/monitoring, fatigue impacting overall mobility status and need for input for mobility technique/safety  Pt to benefit from continued PT tx to address deficits as defined above and maximize level of functional independent mobility and consistency  From PT/mobility standpoint, recommendation at time of d/c would be Home PT with family support pending progress in order to facilitate return to PLOF     Barriers to Discharge None Barriers to Discharge Comments patient reports her daughter will be staying with her at discharge   Goals   Patient Goals "to get my energy back"   [facility] Expiration Date 02/08/21   Short Term Goal #1 In 7-10 days: Increase bilateral LE strength 1/2 grade to facilitate independent mobility, Perform all bed mobility tasks modified independent to decrease caregiver burden, Perform all transfers modified independent to improve independence, Ambulate > 150 ft  with RW modified independent w/o LOB and w/ normalized gait pattern 100% of the time, Increase all balance 1 grade to decrease risk for falls and Complete exercise program independently   PT Treatment Day 0   Plan   Treatment/Interventions Functional transfer training;LE strengthening/ROM; Therapeutic exercise; Endurance training;Patient/family training;Equipment eval/education; Bed mobility;Gait training;Spoke to nursing;OT   PT Frequency Other (Comment)  (3-5x/wk)   Recommendation   PT Discharge Recommendation Home with skilled therapy  (Home PT with family support)   PT - OK to Discharge Yes  (when medically cleared)   AM-PAC Basic Mobility Inpatient   Turning in Bed Without Bedrails 3   Lying on Back to Sitting on Edge of Flat Bed 3   Moving Bed to Chair 3   Standing Up From Chair 3   Walk in Room 3   Climb 3-5 Stairs 3   Basic Mobility Inpatient Raw Score 18   Basic Mobility Standardized Score 41 05   Modified [place] Scale   Modified [place] Scale 4   Barthel Index   Feeding 10   Bathing 0   Grooming Score 5   Dressing Score 5   Bladder Score 10   Bowels Score 10   Toilet Use Score 5   Transfers (Bed/Chair) Score 10   Mobility (Level Surface) Score 0   Stairs Score 0   Barthel Index Score 55       Rogelio Bathe, PT, DPT

## 2021-01-29 NOTE — H&P
H&P- Christy Lynn 5/15/1929, 80 y o  female MRN: [id number]    Unit/Bed#: ED 14 Encounter: [id number]    Primary Care Provider: Merna Bautista MD   Date and time admitted to hospital: 1/28/2021  5:17 PM        * Acalculous cholecystitis  Assessment & Plan  · Associated right upper quadrant tenderness, elevated bilirubin, jaundice  · Check direct bilirubin, elevated alk-phos  · No fever or leukocytosis or other signs of systemic involvement at this time  · CT scan does show mildly dilated intrahepatic CBD and enhancement of extrahepatic CBD  · CT scan also shows concern of cholangitis but the patient does not meet all criteria [place]  Will check CRP, procalcitonin and lipase levels to rule out the other differential diagnoses  · GI consultation sought  · General surgery consulted for the possibility of cholecystectomy  · Got cefepime in the ER  · We will start on Zosyn  Follow-up blood cultures to tailor antibiotics    Abnormal finding on urinalysis  Assessment & Plan  · Indicative of UTI  Patient does not have any UTI symptoms  · Follow urine cultures-as this could be asymptomatic bacteriuria in an immunocompromised patient which would warrant treatment with antibiotics    Colonic mass  Assessment & Plan  · New finding:  Soft tissue mass in the ascending colon 3 9 x 2 1 cm  · Patient denies any blood in the stools, weight loss, loss of appetite or other B symptoms  · Will probably need colonoscopy for biopsy    GI consult requested  · Keep NPO overnight  · General surgery consult  · Family history of colon cancer and pancreatic cancer  · Last colonoscopy about 6 years ago which did not reveal any mass as endorsed by the patient and the daughter at bedside    Hyperlipidemia  Assessment & Plan  · Hold statins    Elevated blood pressure reading  Assessment & Plan  · Mention of hypertension in the chart but patient is not on any antihypertensive medications  · I am not starting any medications at this time   Continue to monitor blood pressure closely and if consistently elevated none will start on antihypertensives    Macular degeneration  Assessment & Plan  · Also has glaucoma  · Continue all 3 eyedrops from home    Esophageal reflux  Assessment & Plan  · Stable  · Not on any medications at this time  · If has reflux-consider giving p o/IV  Protonix    VTE Prophylaxis: Heparin  / sequential compression device   Code Status:  DNR DNI  POLST: There is no POLST form on file for this patient (pre-hospital)  Discussion with family:  Discussed with the daughter Marci Mayer was at bedside  All questions answered in detail  The differential diagnoses and that current medical condition was discussed in detail along with the CT scan findings  Her current medications were also discussed  No concerns raised at the end of our discussion  Okay with the current management plan    Anticipated Length of Stay:  Patient will be admitted on an Inpatient basis with an anticipated length of stay of  greater than 2 midnights  Justification for Hospital Stay:  Patient with a new onset colonic mass along with the elevated transaminases with abdominal pain will require further GI workup and will need surgical evaluation  This will require inpatient hospitalization    Total Time for Visit, including Counseling / Coordination of Care: 1 hour  Greater than 50% of this total time spent on direct patient counseling and coordination of care  Chief Complaint:   Abdominal pain, severe weakness    History of Present Illness:    Arben Mercado is a 80 y o  female who presents with severe weakness -2 weeks, gradually worsening  Appetite normal   No weight loss  Jaundice since 24-48 hours  Saw PCP on the outside and secondary to elevated transaminases was referred to the ER  Endorses high colored urine but no dysuria or frequency or CV angle pain or bladder tenderness  Abdominal tenderness and pain, no distension, no fever    Areas which are painful and tender or left lower quadrant, right lower quadrant, right upper quadrant and epigastrium  Normal bowel movements  No altered mental status  Shortness of breath on minimal exertion since last couple weeks  Denies chest pain, cough, palpitations, night sweats, bone pains, weight loss, loss of appetite, hemoptysis, hematemesis, melena, hematochezia, hematuria, sinus pressure, sinus pain, joint pain, skin wounds, oral ulcers, anxiety, depression, photophobia, blurry vision, paresthesias, loss of muscle strength or sensations  Review of Systems:    Review of Systems as per HPI  All other systems reviewed and are negative    Past Medical and Surgical History:     Past Medical History:   Diagnosis Date    Hyperlipidemia     Osteoporosis     Sciatica        Past Surgical History:   Procedure Laterality Date     SECTION         Meds/Allergies:    Prior to Admission medications    Medication Sig Start Date End Date Taking? Authorizing Provider   acetaminophen (TYLENOL) 500 mg tablet Take 500 mg by mouth as needed for mild pain    Historical Provider, MD   atorvastatin (LIPITOR) 20 mg tablet Take 1 tablet (20 mg total) by mouth daily 21   Alan Schultz MD   cetirizine (ZyrTEC) 10 mg tablet Take 10 mg by mouth daily    Historical Provider, MD   Cholecalciferol ([employer]S VITAMIN D) 2000 units CAPS Take by mouth    Historical Provider, MD   mirtazapine (REMERON) 7 5 MG tablet Take 1 tablet (7 5 mg total) by mouth daily at bedtime 21   Alan Schultz MD   Multiple Vitamins-Minerals (CENTRUM SILVER 50+WOMEN) TABS Take by mouth    Historical Provider, MD   RHOPRESSA 0 02 % SOLN instill 1 drop into both eyes at bedtime 18   Historical Provider, MD Jose A Acosta 1-0 2 % SUSP  18   Historical Provider, MD   VYZULTA 0 024 % SOLN  18   Historical Provider, MD     I have reviewed home medications with patient family member      Allergies: No Known Allergies    Social History: Marital Status:      Substance Use History:   Social History     Substance and Sexual Activity   Alcohol Use Not Currently     Social History     Tobacco Use   Smoking Status Never Smoker   Smokeless Tobacco Never Used     Social History     Substance and Sexual Activity   Drug Use No       Family History:    Sister-colon cancer, father-pancreatic cancer    Physical Exam:     Vitals:   Blood Pressure: 141/64 (01/28/21 2100)  Pulse: 67 (01/28/21 2100)  Temperature: 97 9 °F (36 6 °C) (01/28/21 1728)  Respirations: 20 (01/28/21 2100)  Weight - Scale: 56 2 kg (123 lb 14 4 oz) (01/28/21 1728)  SpO2: 94 % (01/28/21 2100)    Physical Exam    General- Awake, alert and oriented x 3, looks comfortable  HEENT- icteric, Normocephalic, atraumatic, oral mucosa- moist  Neck- Supple, No carotid bruit, no JVD  CVS- Normal S1/ S2, Regular rate and rhythm, systolic murmur predominantly in the mitral area, No edema  Respiratory system- B/L clear breath sounds, no wheezing  Abdomen- right upper quadrant, epigastric, left lower quadrant tenderness, guarding noted, no rigidity, no rebound tenderness, bowel sounds audible all 4 quadrants  Genitourinary- mild suprapubic tenderness, No CVA tenderness  Skin- No new bruise or rash  Musculoskeletal- No gross deformity  Psych- No acute psychosis  CNS- CN II- XII grossly intact, No acute focal neurologic deficit noted      Additional Data:     Lab Results: I have personally reviewed pertinent reports        Results from last 7 days   Lab Units 01/28/21  1759   WBC Thousand/uL 6 69   HEMOGLOBIN g/dL 13 6   HEMATOCRIT % 42 0   PLATELETS Thousands/uL 260   NEUTROS PCT % 50   LYMPHS PCT % 29   MONOS PCT % 15*   EOS PCT % 5     Results from last 7 days   Lab Units 01/28/21  1759   SODIUM mmol/L 138   POTASSIUM mmol/L 4 1   CHLORIDE mmol/L 105   CO2 mmol/L 26   BUN mg/dL 27*   CREATININE mg/dL 1 05   ANION GAP mmol/L 7   CALCIUM mg/dL 9 0   ALBUMIN g/dL 2 7*   TOTAL BILIRUBIN mg/dL 2 00*   ALK PHOS U/L 404*   ALT U/L 1,448*   AST U/L 1,504*   GLUCOSE RANDOM mg/dL 134     Results from last 7 days   Lab Units 01/28/21  1759   INR  1 13         Results from last 7 days   Lab Units 01/28/21  0933   HEMOGLOBIN A1C % 5 7*           Imaging: I have personally reviewed pertinent reports  CT abdomen pelvis with contrast   Final Result by Sabi Cates MD (01/28 1827)      Soft tissue mass in the ascending colon just above the ileocecal valve consistent with neoplasm measuring 3 9 x 2 1 cm  Multiple small mesenteric nodes are noted adjacent to the mass seen on image 601/65  Recommend colonoscopy  Pericholecystic fluid  No calcified gallstones  Possible acalculous cholecystitis  Correlate clinically  Consider ultrasound if any clinical ambiguity  Mild nonspecific enhancement of the extrahepatic CBD  This can be seen in the setting of cholangitis  The study was marked in [facility] for immediate notification  Workstation performed: [id number]              Pathology, and Other Studies Reviewed on Admission:  Yes      Allscripts / Epic Records Reviewed: Yes     ** Please Note: This note has been constructed using a voice recognition system   **

## 2021-01-29 NOTE — CONSULTS
Consultation - General Surgery   [address] 80 y o  female MRN: [id number]  Unit/Bed#: -01 Encounter: [id number]    ASSESSMENT:  CT findings suggestive of acalculous cholecystitis  - no right upper quadrant tenderness negative Xiong sign  Ascending colon soft tissue mass   - mass noted on CT just above the ileocecal bowel consistent with neoplasm measuring 3 9 x 2 1 cm with multiple small mesenteric nodes adjacent to the mass  Hyperlipidemia  Hypertension  Macular degeneration  Esophageal reflux    PLAN:  - Will await MRCP results and GI input for further recommendations  - Will discuss surgical possibilities with patient regarding GB and right colon  - Patient will need cardiology pre-op clearance   - Continue to monitor bowel function  - analgesics and antiemetics p r n   - serial abdominal exams  - continue to monitor vitals and lab results  - okay to have diet as tolerated until a more definitive surgical plan is in place      _______________________________________________________________  Physician Requesting Consult: Pao Bobby MD    Additional consultants:  Gastroenterology    Reason for Consult / Principal Problem:  Ascending colon mass in cholecystitis    HPI: [address] is a 80y o  year old female with a past medical history of macular degeneration, and esophageal reflux, who presents emergency department with complaints of severe weakness over the last 2 weeks that is gradually getting worse  Additionally the patient has been jaundice over the last 2 days and was seen by her PCP as an outpatient and labs were ordered  Patient was found to have elevated transaminitis she was referred to emergency department  Patient unable to tell me about blood in the stool or changes in color of urine and stool was the patient has macular degeneration  Patient reports he has been having abdominal tenderness in the lower abdomen and denies any correlation with p o  Intake or fatty meals    She reports having regular bowel movements  Patient denies any abdominal distention, fevers, chills, weight loss, changes in appetite, changes in mental status, palpitations, night sweats, cough  Historical Information   Past Medical History:   Diagnosis Date    Hyperlipidemia     Osteoporosis     Sciatica      Past Surgical History:   Procedure Laterality Date     SECTION       Social History   Social History     Substance and Sexual Activity   Alcohol Use Not Currently     Social History     Substance and Sexual Activity   Drug Use No     Social History     Tobacco Use   Smoking Status Never Smoker   Smokeless Tobacco Never Used     Family History: non-contributory}    Meds/Allergies   Home meds:   Prior to Admission medications    Medication Sig Start Date End Date Taking?  Authorizing Provider   acetaminophen (TYLENOL) 500 mg tablet Take 500 mg by mouth as needed for mild pain    Historical Provider, MD   atorvastatin (LIPITOR) 20 mg tablet Take 1 tablet (20 mg total) by mouth daily 21   Merna Bautista MD   cetirizine (ZyrTEC) 10 mg tablet Take 10 mg by mouth daily    Historical Provider, MD   Cholecalciferol ([employer] VITAMIN D) 2000 units CAPS Take by mouth    Historical Provider, MD   mirtazapine (REMERON) 7 5 MG tablet Take 1 tablet (7 5 mg total) by mouth daily at bedtime 21   Merna Bautista MD   Multiple Vitamins-Minerals (CENTRUM SILVER 50+WOMEN) TABS Take by mouth    Historical Provider, MD   RHOPRESSA 0 02 % SOLN instill 1 drop into both eyes at bedtime 18   Historical Provider, MD Sheldon Ni 1-0 2 % SUSP  18   Historical Provider, MD   VYZULTA 0 024 % SOLN  18   Historical Provider, MD     Scheduled Meds:  Current Facility-Administered Medications   Medication Dose Route Frequency Provider Last Rate    acetylcysteine  50 mg/kg Intravenous Once Lillian Barker PA-C      acetylcysteine  100 mg/kg Intravenous Once Lillian Barker PA-C      acetylcysteine  150 mg/kg Intravenous Once Lillian Barker PA-C      Brinzolamide-Brimonidine  1 drop Both Eyes BID Marina Manning MD      heparin (porcine)  5,000 Units Subcutaneous Q12H [address] Marina Manning MD      Latanoprostene Bunod  1 drop Both Eyes HS Marina Manning MD      Netarsudil Dimesylate  1 drop Both Eyes HS Marina Manning MD      ondansetron  4 mg Intravenous Q6H PRN Marina Manning MD      piperacillin-tazobactam  3 375 g Intravenous Q8H Marina Manning MD 3 375 g (01/29/21 0855)    sodium chloride  100 mL/hr Intravenous Continuous Marina Manning  mL/hr (01/28/21 2346)     Continuous Infusions:sodium chloride, 100 mL/hr, Last Rate: 100 mL/hr (01/28/21 2346)      PRN Meds:  ondansetron    ALLERGIES: No Known Allergies    Review of Systems:  General:  Weakness  Cardiovascular: no chest pain or dyspnea on exertion  Respiratory: no cough, shortness of breath, or wheezing  Gastrointestinal:  Bilateral lower abdominal pain  Genitourinary: no dysuria, trouble voiding, or hematuria  Musculoskeletal: negative  Neurological: no TIA or stroke symptoms  Hematological and Lymphatic: negative  Dermatological : Jaundice  Psychological: negative  Ophthalmic: negative  ENT: negative      Objective   Vitals:  Blood pressure 110/56, pulse 69, temperature 98 2 °F (36 8 °C), temperature source Oral, resp  rate 18, height 4' 11" (1 499 m), weight 56 2 kg (123 lb 14 4 oz), SpO2 92 %  Body mass index is 25 02 kg/m²  SpO2: SpO2: 92 %    I/Os:  I/O       01/27 0701 - 01/28 0700 01/28 0701 - 01/29 0700 01/29 0701 - 01/30 0700    P  O   0     IV Piggyback  1050     Total Intake(mL/kg)  1050 (18 7)     Urine (mL/kg/hr)  0 50 (0 2)    Total Output  0 50    Net  +1050 -50                 Invasive Lines/Tubes:  Invasive Devices     Peripheral Intravenous Line            Peripheral IV 01/28/21 Left Antecubital less than 1 day                Physical Exam  General appearance: alert, appears stated age and cooperative  Head: Normocephalic, without obvious abnormality, atraumatic  Eyes: scleral icterus  Lungs: clear to auscultation bilaterally  Chest wall: no tenderness  Heart[de-identified] regular rate and rhythm and S1, S2 normal  Abdomen: Soft, slightly distended, slight generalized tenderness to palpation, negative Xiong sign  Genitalia: deferred  Rectal: deferred  Extremities: extremities normal, atraumatic, no cyanosis or edema  Skin: jaundice   Neurologic: Grossly normal      Lab Results and Cultures:   CBC:   Results from last 7 days   Lab Units 01/29/21  0451 01/28/21  1759 01/28/21  0933   WBC Thousand/uL 6 12 6 69 6 29   HEMOGLOBIN g/dL 11 9 13 6 14 4   HEMATOCRIT % 36 4 42 0 44 8   PLATELETS Thousands/uL 222 260 275     BMP/CMP:  Results from last 7 days   Lab Units 01/29/21  0451 01/28/21  1759 01/28/21  0933   POTASSIUM mmol/L 3 7 4 1 4 1   CHLORIDE mmol/L 108 105 109*   CO2 mmol/L 23 26 31   BUN mg/dL 19 27* 21   CREATININE mg/dL 0 86 1 05 1 00   CALCIUM mg/dL 8 2* 9 0 9 6     Coags:   Results from last 7 days   Lab Units 01/29/21  0451 01/28/21  1759   INR  1 18 1 13   PTT seconds  --  29     Lipid panel:   Results from last 7 days   Lab Units 01/28/21  0933   TRIGLYCERIDES mg/dL 205*   HDL mg/dL 9*     HgbA1c:   Lab Results   Component Value Date    HGBA1C 5 7 (H) 01/28/2021    HGBA1C 5 8 (H) 08/25/2020    HGBA1C 5 9 (H) 02/13/2020    HGBA1C 5 9 07/10/2019    HGBA1C 5 9 01/28/2019    HGBA1C 5 8 07/19/2018    HGBA1C 6 0 01/02/2018    HGBA1C 5 8 05/11/2017    HGBA1C 5 8 11/21/2016    HGBA1C 5 8 (H) 05/03/2016       Urinalysis:   Lab Results   Component Value Date    COLORU Yellow 01/28/2021    CLARITYU Clear 01/28/2021    SPECGRAV 1 015 01/28/2021    PHUR 5 5 01/28/2021    LEUKOCYTESUR Negative 01/28/2021    NITRITE Positive (A) 01/28/2021    GLUCOSEU Negative 01/28/2021    KETONESU Negative 01/28/2021    BILIRUBINUR Small (A) 01/28/2021    BLOODU Negative 01/28/2021   ,   Urine Culture:   Lab Results   Component Value Date    URINECX <10,000 cfu/ml  10/28/2019     Wound Culure: No results found for: WOUNDCULT  Blood Culture: No results found for: BLOODCX    Imaging Studies: I have personally reviewed pertinent reports  EKG, Pathology, and Other Studies: I have personally reviewed pertinent reports      VTE Prophylaxis: Heparin     Code Status: Level 3 - DNAR and DNI  Advance Directive and Living Will: Yes    Power of :    POLST:      Counseling / Coordination of Care  None       Jake Yap, [place]  1/29/2021

## 2021-01-29 NOTE — UTILIZATION REVIEW
Initial Clinical Review    Admission: Date/Time/Statement:   Admission Orders (From admission, onward)     Ordered        01/28/21 1959  Inpatient Admission  Once                   Orders Placed This Encounter   Procedures    Inpatient Admission     Standing Status:   Standing     Number of Occurrences:   1     Order Specific Question:   Level of Care     Answer:   Med Surg [16]     Order Specific Question:   Estimated length of stay     Answer:   More than 2 Midnights     Order Specific Question:   Certification     Answer:   I certify that inpatient services are medically necessary for this patient for a duration of greater than two midnights  See H&P and MD Progress Notes for additional information about the patient's course of treatment  ED Arrival Information     Expected Arrival Acuity Means of Arrival Escorted By Service Admission Type    - 1/28/2021 17:14 Urgent Walk-In Family Member General Medicine Urgent    Arrival Complaint    unsteady gait, fatigue         Chief Complaint   Patient presents with    Abnormal Lab     Pt reports she was told by provider to come in for "abnormal liver lab'  Pt reorts feeling tired and weak lately  Assessment/Plan:    80year old female presents to ed from home for evaluation and treatment of elevated liver enzymes and fatigue  Patient reports she has become increasingly tired and unsteady when standing for 4 days  Her pcp advised her to come to the hospital for further evaluation of T bili 2 0 ast 1504, alt 1448, alk phos 404  Clinical assessment significant for crp 31 4, procalcitonin   51  and Ct showing colon mass consistent with neoplasm,   pericholecystic fluid consistent with cholecystitis , possible cholangitis  Treated in ed with iv  9% ns bolus, iv cefepime  Admit to inpatient medical surgical for acalculous cholecystitis    Plan includes general surgery consult, iv zosyn, follow up cultures, GI consult, MRI abdomen/ mrcp, iv acetylcysteine, iv fluids, trend LFTs  1-29    Continue iv antibiotics and iv fluids  MRI abdomen in progress  Consult general surgery  CT findings suggestive of acalculous cholecystitis  - no right upper quadrant tenderness negative Xiong sign  Ascending colon soft tissue mass   - mass noted on CT just above the ileocecal bowel consistent with neoplasm measuring 3 9 x 2 1 cm with multiple small mesenteric nodes adjacent to the mass      PLAN:  - Will await MRCP results and GI input for further recommendations  - Will discuss surgical possibilities with patient regarding GB and right colon  - Patient will need cardiology pre-op clearance   - Continue to monitor bowel function  - analgesics and antiemetics p r n   - serial abdominal exams  - continue to monitor vitals and lab results  - okay to have diet as tolerated until a more definitive surgical       ED Triage Vitals   01/28/21 1728 01/28/21 1728 01/28/21 1728 01/28/21 1728 01/28/21 1728   97 9 °F (36 6 °C) 91 18 145/73 98 %      Oral Monitor         No Pain          01/28/21 56 2 kg (123 lb 14 4 oz)     Additional Vital Signs:     Date/Time  Temp  Pulse  Resp  BP  MAP (mmHg)  SpO2  O2 Device   01/29/21 0749  98 2 °F (36 8 °C)  69  18  110/56  80       01/29/21 0730              None (Room air)   01/28/21 2218  98 1 °F (36 7 °C)  73  20  130/60    92 %  None (Room air)   01/28/21 2100    67  20  141/64  92  94 %  None (Room air)   01/28/21 2030    72  16  162/67  96  95 %  None (Room air)   01/28/21 1900    78  20  135/63  91  95 %  None (Room air)   01/28/21 1845    89  20  140/60  87  99 %  None (Room air)           Pertinent Labs/Diagnostic Test Results:     CT abdomen pelvis with contrast   (01/28 1827)      Soft tissue mass in the ascending colon just above the ileocecal valve consistent with neoplasm measuring 3 9 x 2 1 cm  Multiple small mesenteric nodes are noted adjacent to the mass seen on image 601/65  Recommend colonoscopy  Pericholecystic fluid  No calcified gallstones  Possible acalculous cholecystitis  Correlate clinically  Consider ultrasound if any clinical ambiguity  Mild nonspecific enhancement of the extrahepatic CBD  This can be seen in the setting of cholangitis              MRI abdomen w wo contrast and mrcp    (Results Pending)         Results from last 7 days   Lab Units 01/29/21  0451 01/28/21  1759 01/28/21  0933   WBC Thousand/uL 6 12 6 69 6 29   HEMOGLOBIN g/dL 11 9 13 6 14 4   HEMATOCRIT % 36 4 42 0 44 8   PLATELETS Thousands/uL 222 260 275   NEUTROS ABS Thousands/µL  --  3 33 3 54         Results from last 7 days   Lab Units 01/29/21  0451 01/28/21  1759 01/28/21  0933   SODIUM mmol/L 138 138 139   POTASSIUM mmol/L 3 7 4 1 4 1   CHLORIDE mmol/L 108 105 109*   CO2 mmol/L 23 26 31   ANION GAP mmol/L 7 7 -1*   BUN mg/dL 19 27* 21   CREATININE mg/dL 0 86 1 05 1 00   EGFR ml/min/1 73sq m 59 47 49   CALCIUM mg/dL 8 2* 9 0 9 6   MAGNESIUM mg/dL 1 9  --   --      Results from last 7 days   Lab Units 01/29/21  0451 01/28/21 1759 01/28/21  0933   AST U/L 1,185* 1,504* 1,654*   ALT U/L 1,122* 1,448* 1,551*   ALK PHOS U/L 338* 404* 423*   TOTAL PROTEIN g/dL 7 0 8 4* 8 8*   ALBUMIN g/dL 2 2* 2 7* 3 0*   TOTAL BILIRUBIN mg/dL 1 70* 2 00* 1 76*   BILIRUBIN DIRECT mg/dL  --  1 28*  --          Results from last 7 days   Lab Units 01/29/21  0451 01/28/21  1759   GLUCOSE RANDOM mg/dL 98 134         Results from last 7 days   Lab Units 01/28/21  0933   HEMOGLOBIN A1C % 5 7*   EAG mg/dl 117     Results from last 7 days   Lab Units 01/28/21  1759   TROPONIN I ng/mL <0 02         Results from last 7 days   Lab Units 01/29/21  0451 01/28/21  1759   PROTIME seconds 14 4 13 9   INR  1 18 1 13   PTT seconds  --  29     Results from last 7 days   Lab Units 01/28/21  0933   TSH 3RD GENERATON uIU/mL 2 680     Results from last 7 days   Lab Units 01/28/21  2205   PROCALCITONIN ng/ml 0 51*     Results from last 7 days   Lab Units 01/28/21  1759   LIPASE u/L 204     Results from last 7 days   Lab Units 01/28/21  1759   CRP mg/L 31 4*         Results from last 7 days   Lab Units 01/28/21  1846   CLARITY UA  Clear   COLOR UA  Yellow   SPEC GRAV UA  1 015   PH UA  5 5   GLUCOSE UA mg/dl Negative   KETONES UA mg/dl Negative   BLOOD UA  Negative   PROTEIN UA mg/dl Trace*   NITRITE UA  Positive*   BILIRUBIN UA  Small*   UROBILINOGEN UA E U /dl 1 0   LEUKOCYTES UA  Negative   WBC UA /hpf 2-4   RBC UA /hpf None Seen   BACTERIA UA /hpf Occasional   EPITHELIAL CELLS WET PREP /hpf Occasional       ED Treatment:   Medication Administration from 01/28/2021 1714 to 01/28/2021 2149       Date/Time Order Dose Route Action     01/28/2021 1802 sodium chloride 0 9 % bolus 1,000 mL 1,000 mL Intravenous New Bag     01/28/2021 1927 cefepime (MAXIPIME) 2,000 mg in dextrose 5 % 50 mL IVPB 2,000 mg Intravenous New Bag        Past Medical History:   Diagnosis    Hyperlipidemia    Osteoporosis    Sciatica     Present on Admission:   Hyperlipidemia   Esophageal reflux   Colonic mass   Acalculous cholecystitis   Macular degeneration   Abnormal finding on urinalysis   Elevated blood pressure reading   Anxiety      Admitting Diagnosis: Cholecystitis [K81 9]  Abnormal gait [R26 9]  Fatigue [R53 83]  Small bowel mass [K63 89]  Age/Sex: 80 y o  female  Admission Orders:  Scheduled Medications:  acetylcysteine, 50 mg/kg, Intravenous, Once  acetylcysteine, 100 mg/kg, Intravenous, Once  Brinzolamide-Brimonidine, 1 drop, Both Eyes, BID  heparin (porcine), 5,000 Units, Subcutaneous, Q12H [address]  Latanoprostene Bunod, 1 drop, Both Eyes, HS  Netarsudil Dimesylate, 1 drop, Both Eyes, HS  piperacillin-tazobactam, 3 375 g, Intravenous, Q8H      Continuous IV Infusions:  sodium chloride, 100 mL/hr, Intravenous, Continuous      PRN Meds:  ondansetron, 4 mg, Intravenous, Q6H PRN        IP CONSULT TO GASTROENTEROLOGY  IP CONSULT TO ACUTE CARE SURGERY    Network Utilization Review Department  ATTENTION: Please call with any questions or concerns to [phone] and carefully listen to the prompts so that you are directed to the right person  All voicemails are confidential   Sravanthi Muss all requests for admission clinical reviews, approved or denied determinations and any other requests to dedicated fax number below belonging to the campus where the patient is receiving treatment   List of dedicated fax numbers for the Facilities:  [address] DENIALS (Administrative/Medical Necessity) [phone]   [address] (Maternity/NICU/Pediatrics) [phone]   [address] [place] [facility] [place] [address] (Maciej Hand) [address] [place] [address] [address] [place] [address] [phone]

## 2021-01-30 PROBLEM — R74.01 TRANSAMINITIS: Status: ACTIVE | Noted: 2021-01-30

## 2021-01-30 LAB
ALBUMIN SERPL BCP-MCNC: 1.8 G/DL (ref 3.5–5)
ALP SERPL-CCNC: 297 U/L (ref 46–116)
ALT SERPL W P-5'-P-CCNC: 868 U/L (ref 12–78)
ANION GAP SERPL CALCULATED.3IONS-SCNC: 9 MMOL/L (ref 4–13)
AST SERPL W P-5'-P-CCNC: 777 U/L (ref 5–45)
BASOPHILS # BLD AUTO: 0.05 THOUSANDS/ΜL (ref 0–0.1)
BASOPHILS NFR BLD AUTO: 1 % (ref 0–1)
BILIRUB SERPL-MCNC: 1.5 MG/DL (ref 0.2–1)
BUN SERPL-MCNC: 14 MG/DL (ref 5–25)
CALCIUM ALBUM COR SERPL-MCNC: 9.7 MG/DL (ref 8.3–10.1)
CALCIUM SERPL-MCNC: 7.9 MG/DL (ref 8.3–10.1)
CHLORIDE SERPL-SCNC: 106 MMOL/L (ref 100–108)
CO2 SERPL-SCNC: 23 MMOL/L (ref 21–32)
CREAT SERPL-MCNC: 1 MG/DL (ref 0.6–1.3)
EOSINOPHIL # BLD AUTO: 0.33 THOUSAND/ΜL (ref 0–0.61)
EOSINOPHIL NFR BLD AUTO: 6 % (ref 0–6)
ERYTHROCYTE [DISTWIDTH] IN BLOOD BY AUTOMATED COUNT: 15.7 % (ref 11.6–15.1)
GFR SERPL CREATININE-BSD FRML MDRD: 49 ML/MIN/1.73SQ M
GLUCOSE SERPL-MCNC: 124 MG/DL (ref 65–140)
HAV IGM SER QL: NORMAL
HBV CORE IGM SER QL: NORMAL
HBV SURFACE AG SER QL: NORMAL
HCT VFR BLD AUTO: 34.8 % (ref 34.8–46.1)
HCV AB SER QL: NORMAL
HGB BLD-MCNC: 11.2 G/DL (ref 11.5–15.4)
IMM GRANULOCYTES # BLD AUTO: 0.02 THOUSAND/UL (ref 0–0.2)
IMM GRANULOCYTES NFR BLD AUTO: 0 % (ref 0–2)
INR PPP: 1.34 (ref 0.84–1.19)
LYMPHOCYTES # BLD AUTO: 1.74 THOUSANDS/ΜL (ref 0.6–4.47)
LYMPHOCYTES NFR BLD AUTO: 30 % (ref 14–44)
MCH RBC QN AUTO: 29.2 PG (ref 26.8–34.3)
MCHC RBC AUTO-ENTMCNC: 32.2 G/DL (ref 31.4–37.4)
MCV RBC AUTO: 91 FL (ref 82–98)
MONOCYTES # BLD AUTO: 0.75 THOUSAND/ΜL (ref 0.17–1.22)
MONOCYTES NFR BLD AUTO: 13 % (ref 4–12)
NEUTROPHILS # BLD AUTO: 2.93 THOUSANDS/ΜL (ref 1.85–7.62)
NEUTS SEG NFR BLD AUTO: 50 % (ref 43–75)
NRBC BLD AUTO-RTO: 0 /100 WBCS
PLATELET # BLD AUTO: 210 THOUSANDS/UL (ref 149–390)
PMV BLD AUTO: 10.3 FL (ref 8.9–12.7)
POTASSIUM SERPL-SCNC: 3.4 MMOL/L (ref 3.5–5.3)
PROCALCITONIN SERPL-MCNC: 0.48 NG/ML
PROT SERPL-MCNC: 6.4 G/DL (ref 6.4–8.2)
PROTHROMBIN TIME: 15.9 SECONDS (ref 11.6–14.5)
RBC # BLD AUTO: 3.83 MILLION/UL (ref 3.81–5.12)
SODIUM SERPL-SCNC: 138 MMOL/L (ref 136–145)
WBC # BLD AUTO: 5.82 THOUSAND/UL (ref 4.31–10.16)

## 2021-01-30 PROCEDURE — 86038 ANTINUCLEAR ANTIBODIES: CPT | Performed by: PHYSICIAN ASSISTANT

## 2021-01-30 PROCEDURE — 85025 COMPLETE CBC W/AUTO DIFF WBC: CPT | Performed by: INTERNAL MEDICINE

## 2021-01-30 PROCEDURE — 86664 EPSTEIN-BARR NUCLEAR ANTIGEN: CPT | Performed by: PHYSICIAN ASSISTANT

## 2021-01-30 PROCEDURE — 86308 HETEROPHILE ANTIBODY SCREEN: CPT | Performed by: PHYSICIAN ASSISTANT

## 2021-01-30 PROCEDURE — 99232 SBSQ HOSP IP/OBS MODERATE 35: CPT | Performed by: INTERNAL MEDICINE

## 2021-01-30 PROCEDURE — 84145 PROCALCITONIN (PCT): CPT | Performed by: FAMILY MEDICINE

## 2021-01-30 PROCEDURE — 80053 COMPREHEN METABOLIC PANEL: CPT | Performed by: PHYSICIAN ASSISTANT

## 2021-01-30 PROCEDURE — 85610 PROTHROMBIN TIME: CPT | Performed by: PHYSICIAN ASSISTANT

## 2021-01-30 PROCEDURE — 86663 EPSTEIN-BARR ANTIBODY: CPT | Performed by: PHYSICIAN ASSISTANT

## 2021-01-30 PROCEDURE — 86665 EPSTEIN-BARR CAPSID VCA: CPT | Performed by: PHYSICIAN ASSISTANT

## 2021-01-30 PROCEDURE — 86039 ANTINUCLEAR ANTIBODIES (ANA): CPT | Performed by: PHYSICIAN ASSISTANT

## 2021-01-30 PROCEDURE — 86235 NUCLEAR ANTIGEN ANTIBODY: CPT | Performed by: PHYSICIAN ASSISTANT

## 2021-01-30 PROCEDURE — 80074 ACUTE HEPATITIS PANEL: CPT | Performed by: PHYSICIAN ASSISTANT

## 2021-01-30 PROCEDURE — 99232 SBSQ HOSP IP/OBS MODERATE 35: CPT | Performed by: NURSE PRACTITIONER

## 2021-01-30 RX ORDER — POTASSIUM CHLORIDE 20 MEQ/1
40 TABLET, EXTENDED RELEASE ORAL ONCE
Status: COMPLETED | OUTPATIENT
Start: 2021-01-30 | End: 2021-01-30

## 2021-01-30 RX ADMIN — PIPERACILLIN AND TAZOBACTAM 3.38 G: 36; 4.5 INJECTION, POWDER, FOR SOLUTION INTRAVENOUS at 00:22

## 2021-01-30 RX ADMIN — HEPARIN SODIUM 5000 UNITS: 5000 INJECTION INTRAVENOUS; SUBCUTANEOUS at 08:01

## 2021-01-30 RX ADMIN — POTASSIUM CHLORIDE 40 MEQ: 1500 TABLET, EXTENDED RELEASE ORAL at 10:37

## 2021-01-30 RX ADMIN — PIPERACILLIN AND TAZOBACTAM 3.38 G: 36; 4.5 INJECTION, POWDER, FOR SOLUTION INTRAVENOUS at 08:01

## 2021-01-30 RX ADMIN — HEPARIN SODIUM 5000 UNITS: 5000 INJECTION INTRAVENOUS; SUBCUTANEOUS at 21:51

## 2021-01-30 RX ADMIN — PIPERACILLIN AND TAZOBACTAM 3.38 G: 36; 4.5 INJECTION, POWDER, FOR SOLUTION INTRAVENOUS at 15:11

## 2021-01-30 RX ADMIN — SODIUM CHLORIDE 100 ML/HR: 0.9 INJECTION, SOLUTION INTRAVENOUS at 16:18

## 2021-01-30 RX ADMIN — PIPERACILLIN AND TAZOBACTAM 3.38 G: 36; 4.5 INJECTION, POWDER, FOR SOLUTION INTRAVENOUS at 23:46

## 2021-01-30 NOTE — PROGRESS NOTES
GI Progress Note - Karyle Bellow Cuda 80 y o  female MRN: [id number]    Unit/Bed#: -01 Encounter: [id number]    Subjective: Josephine Vargas reports no significant changes in her symptoms  She has been NPO  She is finishing NAC course  Objective:     Vitals: Blood pressure 106/51, pulse 61, temperature 98 3 °F (36 8 °C), resp  rate 19, height 4' 11" (1 499 m), weight 56 2 kg (123 lb 14 4 oz), SpO2 96 %  ,Body mass index is 25 02 kg/m²  Intake/Output Summary (Last 24 hours) at 1/30/2021 1116  Last data filed at 1/29/2021 1700  Gross per 24 hour   Intake    Output 100 ml   Net -100 ml       Physical Exam:     General Appearance: Alert, oriented x3, appears stated age, nontoxic appearing  Lungs: Clear to auscultation bilaterally, no rales or rhonchi, no labored breathing/accessory muscle use  Heart: Regular rate and rhythm, S1, S2 normal, no murmur, click, rub or gallop  Abdomen: Soft, non-tender, non-distended; bowel sounds normal; no masses or no organomegaly  Extremities: No cyanosis, edema    Invasive Devices     Peripheral Intravenous Line            Peripheral IV 01/28/21 Left Forearm 1 day    Peripheral IV 01/28/21 Right Forearm 1 day                Lab Results:  Results from last 7 days   Lab Units 01/30/21  0502   WBC Thousand/uL 5 82   HEMOGLOBIN g/dL 11 2*   HEMATOCRIT % 34 8   PLATELETS Thousands/uL 210   NEUTROS PCT % 50   LYMPHS PCT % 30   MONOS PCT % 13*   EOS PCT % 6     Results from last 7 days   Lab Units 01/30/21  0502   POTASSIUM mmol/L 3 4*   CHLORIDE mmol/L 106   CO2 mmol/L 23   BUN mg/dL 14   CREATININE mg/dL 1 00   CALCIUM mg/dL 7 9*   ALK PHOS U/L 297*   ALT U/L 868*   AST U/L 777*     Results from last 7 days   Lab Units 01/30/21  0502   INR  1 34*     Results from last 7 days   Lab Units 01/28/21  1759   LIPASE u/L 204       Imaging Studies: I have personally reviewed pertinent imaging studies        Ct Abdomen Pelvis With Contrast  Result Date: 1/28/2021  Impression: Soft tissue mass in the ascending colon just above the ileocecal valve consistent with neoplasm measuring 3 9 x 2 1 cm  Multiple small mesenteric nodes are noted adjacent to the mass seen on image 601/65  Recommend colonoscopy  Pericholecystic fluid  No calcified gallstones  Possible acalculous cholecystitis  Correlate clinically  Consider ultrasound if any clinical ambiguity  Mild nonspecific enhancement of the extrahepatic CBD  This can be seen in the setting of cholangitis  The study was marked in [facility] for immediate notification          Assessment and Plan:     Generalized Fatigue  Elevated LFTs  Jaundice  - She is presenting with 2 weeks of generalized fatigue/weakness as well as jaundice noted over the past 48 hours found to have elevated LFTs, primarily hepatocellular with a significant transaminitis  - Typically a transaminitis >1000 it seen with tylenol toxicity, mushroom poisoning, acute viral hepatitis, and ischemic hepatitis  - There have been no documented episodes of hypotension and she denies near syncopal episodes  - She does report chronic tylenol use of 2 g several times per week x 6 months so concern for possible tylenol toxicity; she is currently finishing a NAC course  - MRI/MRCP pending to rule out cholangitis given nonspecific finding of CBD enhancement on CT; review of imaging is not convincing for a stricture or CBD stone but will await formal read  - Will let her have a normal diet today given low suspicion for biliary obstruction  - LFTs trending down; continue to trend CMP/INR  - Follow up labs for acute viral hepatitis, AIH, EBV  - Supportive care  - Further recommendation pending serologies and MRCP results  - Surgical team following due to concern for acalculous cholecystitis but her symptoms are not consistent with this; no surgical intervention planned currently     Colonic Mass  Abnormal CT  - CT on admission incidentally noted a soft tissue mass in the ascending colon just above the ileocecal valve consistent with possible neoplasm  - She reports normal routine colonoscopies up until 6-7 years ago; has a sister with a history of colon cancer  - She will need a colonoscopy for further workup as she would at least like to have formal diagnosis prior to deciding about potential treatments; we will likely plan for colonoscopy closely as outpatient given the above issue we are addressing first      The patient will be seen by Dr Simone Knight

## 2021-01-30 NOTE — ASSESSMENT & PLAN NOTE
· Present on admission, as evidenced by ALT 1122, AST 1185  · Patient does report chronic Tylenol use over last 6 months  · GI Involved  · NAC Protocol  · Follow LFTs, PT/INR Daily  · Trending down; ,   Total Bilirubin 1 50  · INR stable today at 1 34  · MRCP - results pending  · Hepatitis panel pending

## 2021-01-30 NOTE — ASSESSMENT & PLAN NOTE
· New finding:  Soft tissue mass in the ascending colon 3 9 x 2 1 cm  · Patient denies any blood in the stools, weight loss, loss of appetite or other B symptoms  · GI Consult  · Recommend colonoscopy likely early in the week, wanting to treat elevated LFTs first   · General surgery consult  · Plan pending GI workup    · Family history of colon cancer and pancreatic cancer  · Last colonoscopy about 6 years ago which did not reveal any mass as endorsed by the patient and the daughter at bedside

## 2021-01-30 NOTE — ASSESSMENT & PLAN NOTE
· Associated right upper quadrant tenderness, elevated bilirubin, jaundice  · Direct Bilirubin 1 28, elevated alk-phos  · No fever or leukocytosis or other signs of systemic involvement at this time  · CT scan does show mildly dilated intrahepatic CBD and enhancement of extrahepatic CBD  · CT scan also shows concern of cholangitis but the patient does not meet all criteria [facility]ad  Will check CRP, procalcitonin and lipase levels to rule out the other differential diagnoses  · Procalcitonin elevated at 0 51 as of yesterday; continue Zosyn  CRP 31 4  · GI consultation, appreciate input  · General surgery consulted for the possibility of cholecystectomy  · Patient started on Zosyn on admission; continue    Follow-up blood cultures to tailor antibiotics

## 2021-01-30 NOTE — ASSESSMENT & PLAN NOTE
· Associated right upper quadrant tenderness, elevated bilirubin, jaundice  · With associated transaminitis - suspected chronic tylenol toxicity treated with NAC  · No fever or leukocytosis   · CT scan does show mildly dilated intrahepatic CBD and enhancement of extrahepatic CBD  · CT scan also shows concern of cholangitis but the patient does not meet all criteria [facility]ad  · Procalcitonin elevated and improving - continue Zosyn    CRP 31 4   · GI consultation and surgery consulted, appreciate input - HIDA today   · HIDA normal, so will need to further discuss with specialties in am but it appears Perc drain and cholecystectomy do NOT need to occur Cancer of temporal lobe    Fracture of lumbar spine without cord injury  lumbar surgery in 2006  H/O arthroscopy of shoulder  right - 2001 and 2004  H/O colonoscopy  and endoscopy  H/O foot surgery  right foot  x 2 - 11 yrs ago

## 2021-01-30 NOTE — PLAN OF CARE
Problem: Potential for Falls  Goal: Patient will remain free of falls  Description: INTERVENTIONS:  - Assess patient frequently for physical needs  -  Identify cognitive and physical deficits and behaviors that affect risk of falls  -  [place] fall precautions as indicated by assessment   - Educate patient/family on patient safety including physical limitations  - Instruct patient to call for assistance with activity based on assessment  - Modify environment to reduce risk of injury  - Consider OT/PT consult to assist with strengthening/mobility  Outcome: Progressing     Problem: SAFETY ADULT  Goal: Patient will remain free of falls  Description: INTERVENTIONS:  - Assess patient frequently for physical needs  -  Identify cognitive and physical deficits and behaviors that affect risk of falls    -  [place] fall precautions as indicated by assessment   - Educate patient/family on patient safety including physical limitations  - Instruct patient to call for assistance with activity based on assessment  - Modify environment to reduce risk of injury  - Consider OT/PT consult to assist with strengthening/mobility  Outcome: Progressing

## 2021-01-30 NOTE — PROGRESS NOTES
yohana Note - Vear Moment 5/15/1929, 80 y o  female MRN: [id number]    Unit/Bed#: -01 Encounter: [id number]    Primary Care Provider: Stephani Diop MD   Date and time admitted to hospital: 1/28/2021  5:17 PM    * Acalculous cholecystitis  Assessment & Plan  · Associated right upper quadrant tenderness, elevated bilirubin, jaundice  · Direct Bilirubin 1 28, elevated alk-phos  · No fever or leukocytosis or other signs of systemic involvement at this time  · CT scan does show mildly dilated intrahepatic CBD and enhancement of extrahepatic CBD  · CT scan also shows concern of cholangitis but the patient does not meet all criteria [place]  Will check CRP, procalcitonin and lipase levels to rule out the other differential diagnoses  · Procalcitonin elevated at 0 51 as of yesterday; continue Zosyn  CRP 31 4  · GI consultation, appreciate input  · General surgery consulted for the possibility of cholecystectomy  · Patient started on Zosyn on admission; continue  Follow-up blood cultures to tailor antibiotics    Transaminitis  Assessment & Plan  · Present on admission, as evidenced by ALT 1122, AST 1185  · Patient does report chronic Tylenol use over last 6 months  · GI Involved  · NAC Protocol  · Follow LFTs, PT/INR Daily  · Trending down; ,   Total Bilirubin 1 50  · INR stable today at 1 34  · MRCP - results pending  · Hepatitis panel pending  Anxiety  Assessment & Plan  · Chronic  · Patient maintained on Mirtazapine  · Supportive measures  Elevated blood pressure reading  Assessment & Plan  · Mention of hypertension in the chart but patient is not on any antihypertensive medications  · Blood pressures stable here, 106-110 SBP  · Monitor  Abnormal finding on urinalysis  Assessment & Plan  · Indicative of UTI    Patient does not have any UTI symptoms  · Follow urine cultures-as this could be asymptomatic bacteriuria in an immunocompromised patient which would warrant treatment with antibiotics    Colonic mass  Assessment & Plan  · New finding:  Soft tissue mass in the ascending colon 3 9 x 2 1 cm  · Patient denies any blood in the stools, weight loss, loss of appetite or other B symptoms  · GI Consult  · Recommend colonoscopy likely early in the week, wanting to treat elevated LFTs first   · General surgery consult  · Plan pending GI workup  · Family history of colon cancer and pancreatic cancer  · Last colonoscopy about 6 years ago which did not reveal any mass as endorsed by the patient and the daughter at bedside    Macular degeneration  Assessment & Plan  · Also has glaucoma  · Continue all 3 eyedrops from home    Hyperlipidemia  Assessment & Plan  · Hold statins given elevated transaminases  Esophageal reflux  Assessment & Plan  · Stable  · Not on any medications at this time  · Monitor symptoms  VTE Pharmacologic Prophylaxis:   Pharmacologic: Heparin  Mechanical VTE Prophylaxis in Place: Yes    Patient Centered Rounds: I have performed bedside rounds with nursing staff today  Discussions with Specialists or Other Care Team Provider: Case management, Primary RN, GI Note reviewed     Education and Discussions with Family / Patient: patient    Time Spent for Care: 20 minutes  More than 50% of total time spent on counseling and coordination of care as described above  Current Length of Stay: 2 day(s)    Current Patient Status: Inpatient   Certification Statement: The patient will continue to require additional inpatient hospital stay due to ongoing treatment in setting of elevated LFTs, elevated procalcitonin, pending MRCP  Discharge Plan: Not medically clear    Code Status: Level 3 - DNAR and DNI      Subjective:   Patient resting in bed; denies complaints of abdominal pain, nausea/vomiting, fever, or chills overnight      Objective:     Vitals:   Temp (24hrs), Av 4 °F (36 9 °C), Min:98 °F (36 7 °C), Max:98 9 °F (37 2 °C)    Temp:  [98 °F (36 7 °C)-98 9 °F (37 2 °C)] 98 3 °F (36 8 °C)  HR:  [57-68] 61  Resp:  [18-19] 19  BP: (106-132)/(51-62) 106/51  SpO2:  [94 %-97 %] 96 %  Body mass index is 25 02 kg/m²  Input and Output Summary (last 24 hours): Intake/Output Summary (Last 24 hours) at 1/30/2021 1106  Last data filed at 1/29/2021 1700  Gross per 24 hour   Intake    Output 100 ml   Net -100 ml       Physical Exam:     Physical Exam  Constitutional:       General: She is not in acute distress  Appearance: She is obese  She is ill-appearing (chronic)  Neck:      Musculoskeletal: Normal range of motion  Cardiovascular:      Rate and Rhythm: Normal rate and regular rhythm  Pulses: Normal pulses  Heart sounds: Normal heart sounds  Pulmonary:      Effort: Pulmonary effort is normal       Breath sounds: Normal breath sounds  Abdominal:      General: Bowel sounds are normal       Palpations: Abdomen is soft  Musculoskeletal: Normal range of motion  General: No swelling  Skin:     General: Skin is warm and dry  Capillary Refill: Capillary refill takes less than 2 seconds  Coloration: Skin is pale  Neurological:      Mental Status: She is alert and oriented to person, place, and time     Psychiatric:         Mood and Affect: Mood normal          Additional Data:     Labs:    Results from last 7 days   Lab Units 01/30/21  0502   WBC Thousand/uL 5 82   HEMOGLOBIN g/dL 11 2*   HEMATOCRIT % 34 8   PLATELETS Thousands/uL 210   NEUTROS PCT % 50   LYMPHS PCT % 30   MONOS PCT % 13*   EOS PCT % 6     Results from last 7 days   Lab Units 01/30/21  0502   SODIUM mmol/L 138   POTASSIUM mmol/L 3 4*   CHLORIDE mmol/L 106   CO2 mmol/L 23   BUN mg/dL 14   CREATININE mg/dL 1 00   ANION GAP mmol/L 9   CALCIUM mg/dL 7 9*   ALBUMIN g/dL 1 8*   TOTAL BILIRUBIN mg/dL 1 50*   ALK PHOS U/L 297*   ALT U/L 868*   AST U/L 777*   GLUCOSE RANDOM mg/dL 124     Results from last 7 days   Lab Units 01/30/21  0502   INR  1 34*         Results from last 7 days Lab Units 01/28/21  0933   HEMOGLOBIN A1C % 5 7*     Results from last 7 days   Lab Units 01/28/21  2205   PROCALCITONIN ng/ml 0 51*           * I Have Reviewed All Lab Data Listed Above  * Additional Pertinent Lab Tests Reviewed: All Labs Within Last 24 Hours Reviewed    Imaging:    Imaging Reports Reviewed Today Include: MRCP  Imaging Personally Reviewed by Myself Includes:  None    Recent Cultures (last 7 days):           Last 24 Hours Medication List:   Current Facility-Administered Medications   Medication Dose Route Frequency Provider Last Rate    acetylcysteine  100 mg/kg Intravenous Once Lillian Barker PA-C 5,620 mg (01/29/21 2231)    Brinzolamide-Brimonidine  1 drop Both Eyes BID Treella Job, MD      heparin (porcine)  5,000 Units Subcutaneous Q12H [address] Lavella Meals, MD      Latanoprostene Bunod  1 drop Both Eyes HS Lavella Meals, MD      Netarsudil Dimesylate  1 drop Both Eyes HS Lavella Meals, MD      ondansetron  4 mg Intravenous Q6H PRN Lavella Meals, MD      piperacillin-tazobactam  3 375 g Intravenous Q8H Lavella Meals, MD 3 375 g (01/30/21 0801)    sodium chloride  100 mL/hr Intravenous Continuous Lavella Meals,  mL/hr (01/29/21 1141)        Today, Patient Was Seen By: AMNA Stover    ** Please Note: Dictation voice to text software may have been used in the creation of this document   **

## 2021-01-30 NOTE — ASSESSMENT & PLAN NOTE
· Mention of hypertension in the chart but patient is not on any antihypertensive medications  · Blood pressures stable here, 106-110 SBP  · Monitor

## 2021-01-31 ENCOUNTER — APPOINTMENT (INPATIENT)
Dept: NUCLEAR MEDICINE | Facility: HOSPITAL | Age: 86
DRG: 375 | End: 2021-01-31
Payer: COMMERCIAL

## 2021-01-31 LAB
ALBUMIN SERPL BCP-MCNC: 1.8 G/DL (ref 3.5–5)
ALP SERPL-CCNC: 321 U/L (ref 46–116)
ALT SERPL W P-5'-P-CCNC: 756 U/L (ref 12–78)
ANION GAP SERPL CALCULATED.3IONS-SCNC: 6 MMOL/L (ref 4–13)
AST SERPL W P-5'-P-CCNC: 688 U/L (ref 5–45)
BASOPHILS # BLD AUTO: 0.04 THOUSANDS/ΜL (ref 0–0.1)
BASOPHILS NFR BLD AUTO: 1 % (ref 0–1)
BILIRUB SERPL-MCNC: 2 MG/DL (ref 0.2–1)
BUN SERPL-MCNC: 11 MG/DL (ref 5–25)
CALCIUM ALBUM COR SERPL-MCNC: 9.8 MG/DL (ref 8.3–10.1)
CALCIUM SERPL-MCNC: 8 MG/DL (ref 8.3–10.1)
CHLORIDE SERPL-SCNC: 109 MMOL/L (ref 100–108)
CO2 SERPL-SCNC: 22 MMOL/L (ref 21–32)
CREAT SERPL-MCNC: 0.89 MG/DL (ref 0.6–1.3)
EOSINOPHIL # BLD AUTO: 0.3 THOUSAND/ΜL (ref 0–0.61)
EOSINOPHIL NFR BLD AUTO: 6 % (ref 0–6)
ERYTHROCYTE [DISTWIDTH] IN BLOOD BY AUTOMATED COUNT: 16 % (ref 11.6–15.1)
GFR SERPL CREATININE-BSD FRML MDRD: 57 ML/MIN/1.73SQ M
GLUCOSE SERPL-MCNC: 92 MG/DL (ref 65–140)
HCT VFR BLD AUTO: 34.1 % (ref 34.8–46.1)
HGB BLD-MCNC: 11.3 G/DL (ref 11.5–15.4)
IMM GRANULOCYTES # BLD AUTO: 0.03 THOUSAND/UL (ref 0–0.2)
IMM GRANULOCYTES NFR BLD AUTO: 1 % (ref 0–2)
LYMPHOCYTES # BLD AUTO: 1.46 THOUSANDS/ΜL (ref 0.6–4.47)
LYMPHOCYTES NFR BLD AUTO: 30 % (ref 14–44)
MCH RBC QN AUTO: 29.7 PG (ref 26.8–34.3)
MCHC RBC AUTO-ENTMCNC: 33.1 G/DL (ref 31.4–37.4)
MCV RBC AUTO: 90 FL (ref 82–98)
MONOCYTES # BLD AUTO: 0.65 THOUSAND/ΜL (ref 0.17–1.22)
MONOCYTES NFR BLD AUTO: 13 % (ref 4–12)
NEUTROPHILS # BLD AUTO: 2.44 THOUSANDS/ΜL (ref 1.85–7.62)
NEUTS SEG NFR BLD AUTO: 49 % (ref 43–75)
NRBC BLD AUTO-RTO: 0 /100 WBCS
PLATELET # BLD AUTO: 217 THOUSANDS/UL (ref 149–390)
PMV BLD AUTO: 10.7 FL (ref 8.9–12.7)
POTASSIUM SERPL-SCNC: 3.8 MMOL/L (ref 3.5–5.3)
PROT SERPL-MCNC: 6.2 G/DL (ref 6.4–8.2)
RBC # BLD AUTO: 3.8 MILLION/UL (ref 3.81–5.12)
SODIUM SERPL-SCNC: 137 MMOL/L (ref 136–145)
WBC # BLD AUTO: 4.92 THOUSAND/UL (ref 4.31–10.16)

## 2021-01-31 PROCEDURE — 80053 COMPREHEN METABOLIC PANEL: CPT | Performed by: NURSE PRACTITIONER

## 2021-01-31 PROCEDURE — 99232 SBSQ HOSP IP/OBS MODERATE 35: CPT | Performed by: PHYSICIAN ASSISTANT

## 2021-01-31 PROCEDURE — 78227 HEPATOBIL SYST IMAGE W/DRUG: CPT

## 2021-01-31 PROCEDURE — 99232 SBSQ HOSP IP/OBS MODERATE 35: CPT | Performed by: INTERNAL MEDICINE

## 2021-01-31 PROCEDURE — 85025 COMPLETE CBC W/AUTO DIFF WBC: CPT | Performed by: NURSE PRACTITIONER

## 2021-01-31 PROCEDURE — A9537 TC99M MEBROFENIN: HCPCS

## 2021-01-31 PROCEDURE — G1004 CDSM NDSC: HCPCS

## 2021-01-31 RX ADMIN — MORPHINE SULFATE 2 MG: 2 INJECTION, SOLUTION INTRAMUSCULAR; INTRAVENOUS at 16:54

## 2021-01-31 RX ADMIN — PIPERACILLIN AND TAZOBACTAM 3.38 G: 36; 4.5 INJECTION, POWDER, FOR SOLUTION INTRAVENOUS at 23:25

## 2021-01-31 RX ADMIN — SODIUM CHLORIDE 100 ML/HR: 0.9 INJECTION, SOLUTION INTRAVENOUS at 19:25

## 2021-01-31 RX ADMIN — HEPARIN SODIUM 5000 UNITS: 5000 INJECTION INTRAVENOUS; SUBCUTANEOUS at 21:41

## 2021-01-31 RX ADMIN — PIPERACILLIN AND TAZOBACTAM 3.38 G: 36; 4.5 INJECTION, POWDER, FOR SOLUTION INTRAVENOUS at 08:50

## 2021-01-31 RX ADMIN — PIPERACILLIN AND TAZOBACTAM 3.38 G: 36; 4.5 INJECTION, POWDER, FOR SOLUTION INTRAVENOUS at 17:44

## 2021-01-31 RX ADMIN — HEPARIN SODIUM 5000 UNITS: 5000 INJECTION INTRAVENOUS; SUBCUTANEOUS at 08:50

## 2021-01-31 NOTE — PLAN OF CARE
Problem: Potential for Falls  Goal: Patient will remain free of falls  Description: INTERVENTIONS:  - Assess patient frequently for physical needs  -  Identify cognitive and physical deficits and behaviors that affect risk of falls    -  [place] fall precautions as indicated by assessment   - Educate patient/family on patient safety including physical limitations  - Instruct patient to call for assistance with activity based on assessment  - Modify environment to reduce risk of injury  - Consider OT/PT consult to assist with strengthening/mobility  Outcome: Progressing     Problem: PAIN - ADULT  Goal: Verbalizes/displays adequate comfort level or baseline comfort level  Description: Interventions:  - Encourage patient to monitor pain and request assistance  - Assess pain using appropriate pain scale  - Administer analgesics based on type and severity of pain and evaluate response  - Implement non-pharmacological measures as appropriate and evaluate response  - Consider cultural and social influences on pain and pain management  - Notify physician/advanced practitioner if interventions unsuccessful or patient reports new pain  Outcome: Progressing     Problem: INFECTION - ADULT  Goal: Absence or prevention of progression during hospitalization  Description: INTERVENTIONS:  - Assess and monitor for signs and symptoms of infection  - Monitor lab/diagnostic results  - Monitor all insertion sites, i e  indwelling lines, tubes, and drains  - Monitor endotracheal if appropriate and nasal secretions for changes in amount and color  - [place] appropriate cooling/warming therapies per order  - Administer medications as ordered  - Instruct and encourage patient and family to use good hand hygiene technique  - Identify and instruct in appropriate isolation precautions for identified infection/condition  Outcome: Progressing  Goal: Absence of fever/infection during neutropenic period  Description: INTERVENTIONS:  - Monitor WBC    Outcome: Progressing     Problem: SAFETY ADULT  Goal: Patient will remain free of falls  Description: INTERVENTIONS:  - Assess patient frequently for physical needs  -  Identify cognitive and physical deficits and behaviors that affect risk of falls    -  [place] fall precautions as indicated by assessment   - Educate patient/family on patient safety including physical limitations  - Instruct patient to call for assistance with activity based on assessment  - Modify environment to reduce risk of injury  - Consider OT/PT consult to assist with strengthening/mobility  Outcome: Progressing  Goal: Maintain or return to baseline ADL function  Description: INTERVENTIONS:  -  Assess patient's ability to carry out ADLs; assess patient's baseline for ADL function and identify physical deficits which impact ability to perform ADLs (bathing, care of mouth/teeth, toileting, grooming, dressing, etc )  - Assess/evaluate cause of self-care deficits   - Assess range of motion  - Assess patient's mobility; develop plan if impaired  - Assess patient's need for assistive devices and provide as appropriate  - Encourage maximum independence but intervene and supervise when necessary  - Involve family in performance of ADLs  - Assess for home care needs following discharge   - Consider OT consult to assist with ADL evaluation and planning for discharge  - Provide patient education as appropriate  Outcome: Progressing  Goal: Maintain or return mobility status to optimal level  Description: INTERVENTIONS:  - Assess patient's baseline mobility status (ambulation, transfers, stairs, etc )    - Identify cognitive and physical deficits and behaviors that affect mobility  - Identify mobility aids required to assist with transfers and/or ambulation (gait belt, sit-to-stand, lift, walker, cane, etc )  - [place] fall precautions as indicated by assessment  - Record patient progress and toleration of activity level on Mobility SBAR; progress patient to next Phase/Stage  - Instruct patient to call for assistance with activity based on assessment  - Consider rehabilitation consult to assist with strengthening/weightbearing, etc   Outcome: Progressing     Problem: DISCHARGE PLANNING  Goal: Discharge to home or other facility with appropriate resources  Description: INTERVENTIONS:  - Identify barriers to discharge w/patient and caregiver  - Arrange for needed discharge resources and transportation as appropriate  - Identify discharge learning needs (meds, wound care, etc )  - Arrange for interpretive services to assist at discharge as needed  - Refer to Case Management Department for coordinating discharge planning if the patient needs post-hospital services based on physician/advanced practitioner order or complex needs related to functional status, cognitive ability, or social support system  Outcome: Progressing     Problem: Knowledge Deficit  Goal: Patient/family/caregiver demonstrates understanding of disease process, treatment plan, medications, and discharge instructions  Description: Complete learning assessment and assess knowledge base    Interventions:  - Provide teaching at level of understanding  - Provide teaching via preferred learning methods  Outcome: Progressing

## 2021-01-31 NOTE — PROGRESS NOTES
Progress Note - General Surgery   Mar Smalls 80 y o  female MRN: [id number]  Unit/Bed#: -01 Encounter: [id number]    Assessment/Plan  Sonya Corey is a 80 y o  female     Abnormal LFTs, surgery consulted to r/o acute acalculous cholecystitis  MRCP showed abnormal appearance of gallbladder with wall thickening and/or pericholecystic fluid, no stones were identified  LFTs mostly trending down, mild increase in Tbili 2 0 from 1 5    HIDA ordered to rule out acute cholecystitis  At this time, patient doesn't not clinically appear to have acute cholecystitis  Abdominal pain is improving, and patient has been able to tolerate diet without any worsening of abdominal pain  NPO at this time for HIDA this afternoon  If HIDA positive for acute acalculous cholecystis, may elect to treat with antibiotic therapy vs PERC tube over surgical intervention as patient is asymptomatic and has multiple medical comorbidities  Continue IV Zosyn for now  Trend LFTs  GI following    Subjective/Objective     Subjective: No acute events overnight, abdominal pain is improving    Objective:     Blood pressure 122/57, pulse 60, temperature 98 2 °F (36 8 °C), resp  rate 18, height 4' 11" (1 499 m), weight 56 2 kg (123 lb 14 4 oz), SpO2 94 %  ,Body mass index is 25 02 kg/m²        Intake/Output Summary (Last 24 hours) at 1/31/2021 1113  Last data filed at 1/31/2021 0700  Gross per 24 hour   Intake 1260 ml   Output 1550 ml   Net -290 ml       Invasive Devices     Peripheral Intravenous Line            Peripheral IV 01/28/21 Right Forearm 2 days                Physical Exam: /57   Pulse 60   Temp 98 2 °F (36 8 °C)   Resp 18   Ht 4' 11" (1 499 m)   Wt 56 2 kg (123 lb 14 4 oz)   SpO2 94%   BMI 25 02 kg/m²   General appearance: alert and oriented, in no acute distress  Lungs: clear to auscultation bilaterally  Heart: regular rate and rhythm, S1, S2 normal, no murmur, click, rub or gallop  Abdomen: soft, non-distended, tender to palpation in epigastrium and RUQ, no guarding or rebound, positive bowel sounds  Extremities: extremities normal, warm and well-perfused; no cyanosis, clubbing, or edema    Lab, Imaging and other studies:I have personally reviewed pertinent lab results       VTE Pharmacologic Prophylaxis: Heparin  VTE Mechanical Prophylaxis: sequential compression device    Recent Results (from the past 36 hour(s))   Procalcitonin Reflex    Collection Time: 01/30/21  5:02 AM   Result Value Ref Range    Procalcitonin 0 48 (H) <=0 25 ng/ml   Hepatitis panel, acute    Collection Time: 01/30/21  5:02 AM   Result Value Ref Range    Hepatitis B Surface Ag Non-reactive Non-reactive, NonReactive - Confirmed    Hep A IgM Non-reactive Non-reactive, Equivocal-Suggest Recollect    Hepatitis C Ab Non-reactive Non-reactive    Hep B C IgM Non-reactive Non-reactive   Comprehensive metabolic panel    Collection Time: 01/30/21  5:02 AM   Result Value Ref Range    Sodium 138 136 - 145 mmol/L    Potassium 3 4 (L) 3 5 - 5 3 mmol/L    Chloride 106 100 - 108 mmol/L    CO2 23 21 - 32 mmol/L    ANION GAP 9 4 - 13 mmol/L    BUN 14 5 - 25 mg/dL    Creatinine 1 00 0 60 - 1 30 mg/dL    Glucose 124 65 - 140 mg/dL    Calcium 7 9 (L) 8 3 - 10 1 mg/dL    Corrected Calcium 9 7 8 3 - 10 1 mg/dL     (H) 5 - 45 U/L     (H) 12 - 78 U/L    Alkaline Phosphatase 297 (H) 46 - 116 U/L    Total Protein 6 4 6 4 - 8 2 g/dL    Albumin 1 8 (L) 3 5 - 5 0 g/dL    Total Bilirubin 1 50 (H) 0 20 - 1 00 mg/dL    eGFR 49 ml/min/1 73sq m   Protime-INR    Collection Time: 01/30/21  5:02 AM   Result Value Ref Range    Protime 15 9 (H) 11 6 - 14 5 seconds    INR 1 34 (H) 0 84 - 1 19   CBC and differential    Collection Time: 01/30/21  5:02 AM   Result Value Ref Range    WBC 5 82 4 31 - 10 16 Thousand/uL    RBC 3 83 3 81 - 5 12 Million/uL    Hemoglobin 11 2 (L) 11 5 - 15 4 g/dL    Hematocrit 34 8 34 8 - 46 1 %    MCV 91 82 - 98 fL    MCH 29 2 26 8 - 34 3 pg    MCHC 32 2 31 4 - 37 4 g/dL    RDW 15 7 (H) 11 6 - 15 1 %    MPV 10 3 8 9 - 12 7 fL    Platelets 221 065 - 649 Thousands/uL    nRBC 0 /100 WBCs    Neutrophils Relative 50 43 - 75 %    Immat GRANS % 0 0 - 2 %    Lymphocytes Relative 30 14 - 44 %    Monocytes Relative 13 (H) 4 - 12 %    Eosinophils Relative 6 0 - 6 %    Basophils Relative 1 0 - 1 %    Neutrophils Absolute 2 93 1 85 - 7 62 Thousands/µL    Immature Grans Absolute 0 02 0 00 - 0 20 Thousand/uL    Lymphocytes Absolute 1 74 0 60 - 4 47 Thousands/µL    Monocytes Absolute 0 75 0 17 - 1 22 Thousand/µL    Eosinophils Absolute 0 33 0 00 - 0 61 Thousand/µL    Basophils Absolute 0 05 0 00 - 0 10 Thousands/µL   Comprehensive metabolic panel    Collection Time: 01/31/21  4:44 AM   Result Value Ref Range    Sodium 137 136 - 145 mmol/L    Potassium 3 8 3 5 - 5 3 mmol/L    Chloride 109 (H) 100 - 108 mmol/L    CO2 22 21 - 32 mmol/L    ANION GAP 6 4 - 13 mmol/L    BUN 11 5 - 25 mg/dL    Creatinine 0 89 0 60 - 1 30 mg/dL    Glucose 92 65 - 140 mg/dL    Calcium 8 0 (L) 8 3 - 10 1 mg/dL    Corrected Calcium 9 8 8 3 - 10 1 mg/dL     (H) 5 - 45 U/L     (H) 12 - 78 U/L    Alkaline Phosphatase 321 (H) 46 - 116 U/L    Total Protein 6 2 (L) 6 4 - 8 2 g/dL    Albumin 1 8 (L) 3 5 - 5 0 g/dL    Total Bilirubin 2 00 (H) 0 20 - 1 00 mg/dL    eGFR 57 ml/min/1 73sq m   CBC and differential    Collection Time: 01/31/21  4:44 AM   Result Value Ref Range    WBC 4 92 4 31 - 10 16 Thousand/uL    RBC 3 80 (L) 3 81 - 5 12 Million/uL    Hemoglobin 11 3 (L) 11 5 - 15 4 g/dL    Hematocrit 34 1 (L) 34 8 - 46 1 %    MCV 90 82 - 98 fL    MCH 29 7 26 8 - 34 3 pg    MCHC 33 1 31 4 - 37 4 g/dL    RDW 16 0 (H) 11 6 - 15 1 %    MPV 10 7 8 9 - 12 7 fL    Platelets 908 211 - 856 Thousands/uL    nRBC 0 /100 WBCs    Neutrophils Relative 49 43 - 75 %    Immat GRANS % 1 0 - 2 %    Lymphocytes Relative 30 14 - 44 %    Monocytes Relative 13 (H) 4 - 12 %    Eosinophils Relative 6 0 - 6 %    Basophils Relative 1 0 - 1 %    Neutrophils Absolute 2 44 1 85 - 7 62 Thousands/µL    Immature Grans Absolute 0 03 0 00 - 0 20 Thousand/uL    Lymphocytes Absolute 1 46 0 60 - 4 47 Thousands/µL    Monocytes Absolute 0 65 0 17 - 1 22 Thousand/µL    Eosinophils Absolute 0 30 0 00 - 0 61 Thousand/µL    Basophils Absolute 0 04 0 00 - 0 10 Thousands/µL

## 2021-01-31 NOTE — ASSESSMENT & PLAN NOTE
· Indicative of UTI    Patient does not have any UTI symptoms  · Likely related to bilirubin spillage, monitor

## 2021-01-31 NOTE — ASSESSMENT & PLAN NOTE
· Chronic - she is very nervous and anxious regarding her admission, workup, and possibility of cancer  · Patient maintained on Mirtazapine  · Supportive measures

## 2021-01-31 NOTE — ASSESSMENT & PLAN NOTE
· Present on admission, as evidenced by ALT 1122, AST 1185  · Patient does report chronic Tylenol use over last 6 months  · GI Involved - completed NAC Protocol  · Continue to follow LFTs, PT/INR Daily - trending down  · Hepatitis panel negative

## 2021-01-31 NOTE — PROGRESS NOTES
SL[facility] Progress Note - Katelyn Warren 5/15/1929, 80 y o  female MRN: [id number]    Unit/Bed#: -01 Encounter: [id number]    Primary Care Provider: Sterlign Thomas MD   Date and time admitted to hospital: 1/28/2021  5:17 PM    * Acalculous cholecystitis  Assessment & Plan  · Associated right upper quadrant tenderness, elevated bilirubin, jaundice  · With associated transaminitis - suspected chronic tylenol toxicity treated with NAC  · No fever or leukocytosis   · CT scan does show mildly dilated intrahepatic CBD and enhancement of extrahepatic CBD  · CT scan also shows concern of cholangitis but the patient does not meet all criteria [facility]  · Procalcitonin elevated and improving - continue Zosyn  CRP 31 4   · GI consultation and surgery consulted, appreciate input - HIDA today   · HIDA normal, so will need to further discuss with specialties in am but it appears Perc drain and cholecystectomy do NOT need to occur    Esophageal reflux  Assessment & Plan  · Stable, no complaints  · Not on any medications at this time    Transaminitis  Assessment & Plan  · Present on admission, as evidenced by ALT 1122, AST 1185  · Patient does report chronic Tylenol use over last 6 months  · GI Involved - completed NAC Protocol  · Continue to follow LFTs, PT/INR Daily - trending down  · Hepatitis panel negative  Colonic mass  Assessment & Plan  · New finding:  Soft tissue mass in the ascending colon 3 9 x 2 1 cm  · Patient denies any blood in the stools, weight loss, loss of appetite or other B symptoms  · GI Consulted  · Recommending colonoscopy ?  This week, pending stability of transaminitis and cholecystitis workup  · Family history of colon cancer and pancreatic cancer  · Last colonoscopy about 6 years ago which did not reveal any mass as endorsed by the patient and the daughter at bedside on admit    Elevated blood pressure reading  Assessment & Plan  · No medications, stable and being monitored    Anxiety  Assessment & Plan  · Chronic - she is very nervous and anxious regarding her admission, workup, and possibility of cancer  · Patient maintained on Mirtazapine  · Supportive measures  Abnormal finding on urinalysis  Assessment & Plan  · Indicative of UTI  Patient does not have any UTI symptoms  · Likely related to bilirubin spillage, monitor    Macular degeneration  Assessment & Plan  · Also has glaucoma; continue all 3 eyedrops from home    Hyperlipidemia  Assessment & Plan  · Hold statins given elevated transaminases  VTE Pharmacologic Prophylaxis:   Pharmacologic: Heparin  Mechanical VTE Prophylaxis in Place: Yes    Patient Centered Rounds: I have performed bedside rounds with nursing staff today  Discussions with Specialists or Other Care Team Provider: reviewed GI and surgery notes - awaiting HIDA at time of my eval with patient    Education and Discussions with Family / Patient: patient; did not call family as still awaiting results    Time Spent for Care: 20 minutes  More than 50% of total time spent on counseling and coordination of care as described above  Current Length of Stay: 3 day(s)    Current Patient Status: Inpatient   Certification Statement: The patient will continue to require additional inpatient hospital stay due to as HIDA is normal appearing by report, suspect patient will need colonoscopy to further evaluation the colonic mass    Discharge Plan: pending GI workup for colon mass    Code Status: Level 3 - DNAR and DNI      Subjective:   Patient seen after HIDA, no results at this time  She is very nervous, worried about the "unknown"  She reports "I have never felt this sick in my life"  Denies new symptoms, nausea or vomiting  Has  No new pains, but just doesn't feel well  Denies SOB or palpitations      Objective:     Vitals:   Temp (24hrs), Av 3 °F (36 8 °C), Min:98 2 °F (36 8 °C), Max:98 4 °F (36 9 °C)    Temp:  [98 2 °F (36 8 °C)-98 4 °F (36 9 °C)] 98 2 °F (36 8 °C)  HR:  [54-60] 60  Resp:  [18] 18  BP: (103-122)/(54-57) 122/57  SpO2:  [94 %-95 %] 94 %  Body mass index is 25 02 kg/m²  Input and Output Summary (last 24 hours): Intake/Output Summary (Last 24 hours) at 1/31/2021 1422  Last data filed at 1/31/2021 0700  Gross per 24 hour   Intake 1260 ml   Output 1550 ml   Net -290 ml       Physical Exam:     Physical Exam  Vitals signs and nursing note reviewed  Constitutional:       General: She is in acute distress (very anxious)  Appearance: Normal appearance  She is obese  She is not ill-appearing or toxic-appearing  Cardiovascular:      Rate and Rhythm: Normal rate  Heart sounds: Normal heart sounds  Pulmonary:      Effort: Pulmonary effort is normal  No respiratory distress  Breath sounds: Normal breath sounds  Abdominal:      General: Bowel sounds are normal  There is no distension  Palpations: Abdomen is soft  Tenderness: There is no abdominal tenderness  There is no guarding  Musculoskeletal:      Right lower leg: No edema  Left lower leg: No edema  Skin:     General: Skin is warm and dry  Coloration: Skin is not pale  Neurological:      Mental Status: She is alert and oriented to person, place, and time  Psychiatric:         Mood and Affect: Mood is anxious             Additional Data:     Labs:    Results from last 7 days   Lab Units 01/31/21  0444   WBC Thousand/uL 4 92   HEMOGLOBIN g/dL 11 3*   HEMATOCRIT % 34 1*   PLATELETS Thousands/uL 217   NEUTROS PCT % 49   LYMPHS PCT % 30   MONOS PCT % 13*   EOS PCT % 6     Results from last 7 days   Lab Units 01/31/21  0444   SODIUM mmol/L 137   POTASSIUM mmol/L 3 8   CHLORIDE mmol/L 109*   CO2 mmol/L 22   BUN mg/dL 11   CREATININE mg/dL 0 89   ANION GAP mmol/L 6   CALCIUM mg/dL 8 0*   ALBUMIN g/dL 1 8*   TOTAL BILIRUBIN mg/dL 2 00*   ALK PHOS U/L 321*   ALT U/L 756*   AST U/L 688*   GLUCOSE RANDOM mg/dL 92     Results from last 7 days   Lab Units 01/30/21  0502   INR  1 34*         Results from last 7 days   Lab Units 01/28/21  0933   HEMOGLOBIN A1C % 5 7*     Results from last 7 days   Lab Units 01/30/21  0502 01/28/21  2205   PROCALCITONIN ng/ml 0 48* 0 51*           * I Have Reviewed All Lab Data Listed Above  * Additional Pertinent Lab Tests Reviewed: All Labs Within Last 24 Hours Reviewed    Imaging:    Imaging Reports Reviewed Today Include: CT, MRCP, HIDA  Imaging Personally Reviewed by Myself Includes:      Recent Cultures (last 7 days):           Last 24 Hours Medication List:   Current Facility-Administered Medications   Medication Dose Route Frequency Provider Last Rate    Brinzolamide-Brimonidine  1 drop Both Eyes BID Fannie Dacosta MD      heparin (porcine)  5,000 Units Subcutaneous Q12H [address] Fannie Dacosta MD      Latanoprostene Bunod  1 drop Both Eyes HS Fannie Dacosta MD      Netarsudil Dimesylate  1 drop Both Eyes HS Fannie Dacosta MD      ondansetron  4 mg Intravenous Q6H PRN Fannie Dacosta MD      piperacillin-tazobactam  3 375 g Intravenous Kraig Sibley MD 3 375 g (01/31/21 0850)    sodium chloride  100 mL/hr Intravenous Continuous Fannie Dacosta  mL/hr (01/30/21 1618)        Today, Patient Was Seen By: Magalys Gimenez PA-C    ** Please Note: Dictation voice to text software may have been used in the creation of this document   **

## 2021-01-31 NOTE — PROGRESS NOTES
Progress Note  Nhi Smalls 80 y o  female MRN: [id number]  Unit/Bed#: -01 Encounter: [id number]    Subjective:  She is alert and oriented  She is still on the reporting extreme fatigue  She ate yesterday fine  She had no nausea vomiting or abdominal pain  She had no fevers chills  She reports that she is just tired and that her back hurts a little bit  Objective:     Vitals:   Vitals:    01/31/21 0654   BP: 122/57   Pulse: 60   Resp: 18   Temp: 98 2 °F (36 8 °C)   SpO2: 94%   ,Body mass index is 25 02 kg/m²        Intake/Output Summary (Last 24 hours) at 1/31/2021 1053  Last data filed at 1/31/2021 0700  Gross per 24 hour   Intake 1260 ml   Output 1550 ml   Net -290 ml       Physical Exam:     General Appearance: Alert, appears stated age and cooperative  Lungs: Clear to auscultation bilaterally, no rales or rhonchi, no labored breathing/accessory muscle use  Heart: Regular rate and rhythm, S1, S2 normal, no murmur, click, rub or gallop  Abdomen: Soft, non-tender, non-distended; bowel sounds normal; no masses or no organomegaly  Extremities: No cyanosis, edema    Invasive Devices     Peripheral Intravenous Line            Peripheral IV 01/28/21 Right Forearm 2 days                Lab Results:  Admission on 01/28/2021   Component Date Value    Sodium 01/28/2021 138     Potassium 01/28/2021 4 1     Chloride 01/28/2021 105     CO2 01/28/2021 26     ANION GAP 01/28/2021 7     BUN 01/28/2021 27*    Creatinine 01/28/2021 1 05     Glucose 01/28/2021 134     Calcium 01/28/2021 9 0     Corrected Calcium 01/28/2021 10 0     AST 01/28/2021 1,504*    ALT 01/28/2021 1,448*    Alkaline Phosphatase 01/28/2021 404*    Total Protein 01/28/2021 8 4*    Albumin 01/28/2021 2 7*    Total Bilirubin 01/28/2021 2 00*    eGFR 01/28/2021 47     WBC 01/28/2021 6 69     RBC 01/28/2021 4 62     Hemoglobin 01/28/2021 13 6     Hematocrit 01/28/2021 42 0     MCV 01/28/2021 91     MCH 01/28/2021 29 4     MCHC 01/28/2021 32 4     RDW 01/28/2021 15 2*    MPV 01/28/2021 10 4     Platelets 04/18/8086 260     nRBC 01/28/2021 0     Neutrophils Relative 01/28/2021 50     Immat GRANS % 01/28/2021 0     Lymphocytes Relative 01/28/2021 29     Monocytes Relative 01/28/2021 15*    Eosinophils Relative 01/28/2021 5     Basophils Relative 01/28/2021 1     Neutrophils Absolute 01/28/2021 3 33     Immature Grans Absolute 01/28/2021 0 03     Lymphocytes Absolute 01/28/2021 1 91     Monocytes Absolute 01/28/2021 1 01     Eosinophils Absolute 01/28/2021 0 33     Basophils Absolute 01/28/2021 0 08     Troponin I 01/28/2021 <0 02     Color, UA 01/28/2021 Yellow     Clarity, UA 01/28/2021 Clear     Specific Gravity, UA 01/28/2021 1 015     pH, UA 01/28/2021 5 5     Leukocytes, UA 01/28/2021 Negative     Nitrite, UA 01/28/2021 Positive*    Protein, UA 01/28/2021 Trace*    Glucose, UA 01/28/2021 Negative     Ketones, UA 01/28/2021 Negative     Urobilinogen, UA 01/28/2021 1 0     Bilirubin, UA 01/28/2021 Small*    Blood, UA 01/28/2021 Negative     Protime 01/28/2021 13 9     INR 01/28/2021 1 13     PTT 01/28/2021 29     RBC, UA 01/28/2021 None Seen     WBC, UA 01/28/2021 2-4     Epithelial Cells 01/28/2021 Occasional     Bacteria, UA 01/28/2021 Occasional     CRP 01/28/2021 31 4*    Procalcitonin 01/28/2021 0 51*    Bilirubin, Direct 01/28/2021 1 28*    Lipase 01/28/2021 204     Sodium 01/29/2021 138     Potassium 01/29/2021 3 7     Chloride 01/29/2021 108     CO2 01/29/2021 23     ANION GAP 01/29/2021 7     BUN 01/29/2021 19     Creatinine 01/29/2021 0 86     Glucose 01/29/2021 98     Calcium 01/29/2021 8 2*    Corrected Calcium 01/29/2021 9 6     AST 01/29/2021 1,185*    ALT 01/29/2021 1,122*    Alkaline Phosphatase 01/29/2021 338*    Total Protein 01/29/2021 7 0     Albumin 01/29/2021 2 2*    Total Bilirubin 01/29/2021 1 70*    eGFR 01/29/2021 59     Magnesium 01/29/2021 1 9     WBC 01/29/2021 6 12     RBC 01/29/2021 4 02     Hemoglobin 01/29/2021 11 9     Hematocrit 01/29/2021 36 4     MCV 01/29/2021 91     MCH 01/29/2021 29 6     MCHC 01/29/2021 32 7     RDW 01/29/2021 15 3*    Platelets 79/22/5381 222     MPV 01/29/2021 10 2     Protime 01/29/2021 14 4     INR 01/29/2021 1 18     Procalcitonin 01/30/2021 0 48*    Ventricular Rate 01/28/2021 82     Atrial Rate 01/28/2021 82     SD Interval 01/28/2021 126     QRSD Interval 01/28/2021 100     QT Interval 01/28/2021 374     QTC Interval 01/28/2021 436     P Axis 01/28/2021 80     QRS Axis 01/28/2021 17     T Wave Axis 01/28/2021 42     Hepatitis B Surface Ag 01/30/2021 Non-reactive     Hep A IgM 01/30/2021 Non-reactive     Hepatitis C Ab 01/30/2021 Non-reactive     Hep B C IgM 01/30/2021 Non-reactive     Sodium 01/30/2021 138     Potassium 01/30/2021 3 4*    Chloride 01/30/2021 106     CO2 01/30/2021 23     ANION GAP 01/30/2021 9     BUN 01/30/2021 14     Creatinine 01/30/2021 1 00     Glucose 01/30/2021 124     Calcium 01/30/2021 7 9*    Corrected Calcium 01/30/2021 9 7     AST 01/30/2021 777*    ALT 01/30/2021 868*    Alkaline Phosphatase 01/30/2021 297*    Total Protein 01/30/2021 6 4     Albumin 01/30/2021 1 8*    Total Bilirubin 01/30/2021 1 50*    eGFR 01/30/2021 49     Protime 01/30/2021 15 9*    INR 01/30/2021 1 34*    WBC 01/30/2021 5 82     RBC 01/30/2021 3 83     Hemoglobin 01/30/2021 11 2*    Hematocrit 01/30/2021 34 8     MCV 01/30/2021 91     MCH 01/30/2021 29 2     MCHC 01/30/2021 32 2     RDW 01/30/2021 15 7*    MPV 01/30/2021 10 3     Platelets 52/08/7751 210     nRBC 01/30/2021 0     Neutrophils Relative 01/30/2021 50     Immat GRANS % 01/30/2021 0     Lymphocytes Relative 01/30/2021 30     Monocytes Relative 01/30/2021 13*    Eosinophils Relative 01/30/2021 6     Basophils Relative 01/30/2021 1     Neutrophils Absolute 01/30/2021 2 93     Immature Grans Absolute 01/30/2021 0 02     Lymphocytes Absolute 01/30/2021 1 74     Monocytes Absolute 01/30/2021 0 75     Eosinophils Absolute 01/30/2021 0 33     Basophils Absolute 01/30/2021 0 05     Sodium 01/31/2021 137     Potassium 01/31/2021 3 8     Chloride 01/31/2021 109*    CO2 01/31/2021 22     ANION GAP 01/31/2021 6     BUN 01/31/2021 11     Creatinine 01/31/2021 0 89     Glucose 01/31/2021 92     Calcium 01/31/2021 8 0*    Corrected Calcium 01/31/2021 9 8     AST 01/31/2021 688*    ALT 01/31/2021 756*    Alkaline Phosphatase 01/31/2021 321*    Total Protein 01/31/2021 6 2*    Albumin 01/31/2021 1 8*    Total Bilirubin 01/31/2021 2 00*    eGFR 01/31/2021 57     WBC 01/31/2021 4 92     RBC 01/31/2021 3 80*    Hemoglobin 01/31/2021 11 3*    Hematocrit 01/31/2021 34 1*    MCV 01/31/2021 90     MCH 01/31/2021 29 7     MCHC 01/31/2021 33 1     RDW 01/31/2021 16 0*    MPV 01/31/2021 10 7     Platelets 91/01/1840 217     nRBC 01/31/2021 0     Neutrophils Relative 01/31/2021 49     Immat GRANS % 01/31/2021 1     Lymphocytes Relative 01/31/2021 30     Monocytes Relative 01/31/2021 13*    Eosinophils Relative 01/31/2021 6     Basophils Relative 01/31/2021 1     Neutrophils Absolute 01/31/2021 2 44     Immature Grans Absolute 01/31/2021 0 03     Lymphocytes Absolute 01/31/2021 1 46     Monocytes Absolute 01/31/2021 0 65     Eosinophils Absolute 01/31/2021 0 30     Basophils Absolute 01/31/2021 0 04        Imaging Studies:   I have personally reviewed pertinent imaging studies  Mri Abdomen W Wo Contrast And Mrcp    Result Date: 1/30/2021  Narrative: MRI OF THE ABDOMEN WITH AND WITHOUT CONTRAST WITH MRCP INDICATION:  Assess CBD with possible a calculus cholecystitis on CT scan  COMPARISON: CT scan from yesterday   TECHNIQUE:  The following pulse sequences were obtained:  Coronal and axial T2 with TE of 90 and 180 respectively, axial T2 with fat saturation, axial FIESTA fat-sat, axial T1-weighted in-and-out-of phase, axial DWI/ADC, pre-contrast axial T1 with fat saturation, post-contrast dynamic axial T1 with fat saturation at 20, 70, and 180 seconds, followed by coronal and 7 minute delayed axial T1 with fat saturation  3D MRCP images were obtained with radial thick slabs and projections  3D rendering was performed from the acquisition scanner  IV Contrast:  5 mL of Gadobutrol injection (SINGLE-DOSE) FINDINGS: LOWER CHEST:   Unremarkable  LIVER: Normal in size and configuration  No suspicious mass  The hepatic veins and portal veins are patent  BILE DUCTS:  No intrahepatic or extrahepatic bile duct dilation  Common bile duct is normal in caliber  No choledocholithiasis, biliary stricture or suspicious mass  GALLBLADDER:  Gallbladder wall thickening  No stones  PANCREAS:  Normal  No main pancreatic ductal dilation  ADRENAL GLANDS:  Normal  SPLEEN:  Normal  KIDNEYS/PROXIMAL URETERS:  No hydroureteronephrosis  No suspicious renal mass  BOWEL:   Enhancing focus in the ascending colon reidentified suspicious for neoplasm measuring approximately 3 5 cm on image 14/323  PERITONEUM/RETROPERITONEUM:  No ascites  LYMPH NODES:  Upper abdominal adenopathy including a portacaval node measuring 1 2 cm  VASCULAR STRUCTURES:  No aneurysm  ABDOMINAL WALL:  Unremarkable  OSSEOUS STRUCTURES:  No suspicious osseous lesion  Impression: Gallbladder has an abnormal appearance with wall thickening and/or pericholecystic fluid  No stones identified  Possibility is raised of acalculous cholecystitis  CBD is normal in diameter  There is mild nonspecific enhancement of the distal CBD which may represent mild cholangitis  Upper abdominal portacaval lymphadenopathy measuring 1 2 cm  Enhancing focus in the ascending colon wall reidentified suspicious for neoplasm measuring approximately 3 5 cm on image 14/323   Workstation performed: [id number]     Ct Abdomen Pelvis With Contrast    Result Date: 1/28/2021  Narrative: CT ABDOMEN AND PELVIS WITH IV CONTRAST INDICATION:   Abdominal pain, acute, nonlocalized pain,l transaminitis  COMPARISON:  None  TECHNIQUE:  CT examination of the abdomen and pelvis was performed  Axial, sagittal, and coronal 2D reformatted images were created from the source data and submitted for interpretation  Radiation dose length product (DLP) for this visit:  538 65 mGy-cm   This examination, like all CT scans performed in the [facility], was performed utilizing techniques to minimize radiation dose exposure, including the use of iterative  reconstruction and automated exposure control  IV Contrast:  100 mL of iohexol (OMNIPAQUE) Enteric Contrast:  Enteric contrast was not administered  FINDINGS: ABDOMEN LOWER CHEST:  No clinically significant abnormality identified in the visualized lower chest  LIVER/BILIARY TREE:  Mild intrahepatic ductal dilatation  Extrahepatic CBD with mild mucosal enhancement  GALLBLADDER:  No calcified stones identified  Minimal pericholecystic fluid  SPLEEN:  Unremarkable  PANCREAS:  Unremarkable  ADRENAL GLANDS:  Unremarkable  KIDNEYS/URETERS:  Unremarkable  No hydronephrosis  STOMACH AND BOWEL:  Colonic diverticulosis  Soft tissue mass in the ascending colon just above the ileocecal valve consistent with neoplasm measuring 3 9 x 2 1 cm  Multiple small mesenteric nodes are noted adjacent to the mass seen on image 601/65  APPENDIX:  No findings to suggest appendicitis  ABDOMINOPELVIC CAVITY:  No ascites  No pneumoperitoneum  Small mesenteric nodes in the right lower quadrant adjacent to the colon mass  VESSELS:  Unremarkable for patient's age  PELVIS REPRODUCTIVE ORGANS:  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  URINARY BLADDER:  Unremarkable  ABDOMINAL WALL/INGUINAL REGIONS:  Unremarkable  OSSEOUS STRUCTURES:  No acute fracture or destructive osseous lesion  Advanced degenerative changes of the lumbar spine  Impression: Soft tissue mass in the ascending colon just above the ileocecal valve consistent with neoplasm measuring 3 9 x 2 1 cm  Multiple small mesenteric nodes are noted adjacent to the mass seen on image 601/65  Recommend colonoscopy  Pericholecystic fluid  No calcified gallstones  Possible acalculous cholecystitis  Correlate clinically  Consider ultrasound if any clinical ambiguity  Mild nonspecific enhancement of the extrahepatic CBD  This can be seen in the setting of cholangitis  The study was marked in [facility] for immediate notification  Workstation performed: [id number]         Assessment & Plan  Principal Problem:    Acalculous cholecystitis  Active Problems:    Esophageal reflux    Hyperlipidemia    Macular degeneration    Colonic mass    Abnormal finding on urinalysis    Elevated blood pressure reading    Anxiety    Transaminitis      She is a 79-year-old female with abnormal liver function tests which I think are from chronic Tylenol toxicity  The patient does have biliary dilatation but does not have a stricture stone according to the MRI  Her clinical picture is not compatible with cholangitis  The patient really has had no GI symptoms at all  She has no fevers chills nausea vomiting or pain      Her gallbladder appears abnormal on the imaging studies so she will be having a HIDA scan to rule out acalculous cholecystitis  Continue Zosyn for now  Follow LFTs  Liver labs are pending  She has a mass in her colon on the imaging and will need a colonoscopy at some point but I explained to her that this is not important right now  Continue diet; NPO after midnight for nuclear medicine study tomorrow    Rhys Andres MD  1/31/2021,10:53 AM

## 2021-01-31 NOTE — PLAN OF CARE
Problem: Potential for Falls  Goal: Patient will remain free of falls  Description: INTERVENTIONS:  - Assess patient frequently for physical needs  -  Identify cognitive and physical deficits and behaviors that affect risk of falls    -  [place] fall precautions as indicated by assessment   - Educate patient/family on patient safety including physical limitations  - Instruct patient to call for assistance with activity based on assessment  - Modify environment to reduce risk of injury  - Consider OT/PT consult to assist with strengthening/mobility  Outcome: Progressing     Problem: PAIN - ADULT  Goal: Verbalizes/displays adequate comfort level or baseline comfort level  Description: Interventions:  - Encourage patient to monitor pain and request assistance  - Assess pain using appropriate pain scale  - Administer analgesics based on type and severity of pain and evaluate response  - Implement non-pharmacological measures as appropriate and evaluate response  - Consider cultural and social influences on pain and pain management  - Notify physician/advanced practitioner if interventions unsuccessful or patient reports new pain  Outcome: Progressing     Problem: INFECTION - ADULT  Goal: Absence or prevention of progression during hospitalization  Description: INTERVENTIONS:  - Assess and monitor for signs and symptoms of infection  - Monitor lab/diagnostic results  - Monitor all insertion sites, i e  indwelling lines, tubes, and drains  - Monitor endotracheal if appropriate and nasal secretions for changes in amount and color  - [place] appropriate cooling/warming therapies per order  - Administer medications as ordered  - Instruct and encourage patient and family to use good hand hygiene technique  - Identify and instruct in appropriate isolation precautions for identified infection/condition  Outcome: Progressing  Goal: Absence of fever/infection during neutropenic period  Description: INTERVENTIONS:  - Monitor WBC    Outcome: Progressing     Problem: SAFETY ADULT  Goal: Patient will remain free of falls  Description: INTERVENTIONS:  - Assess patient frequently for physical needs  -  Identify cognitive and physical deficits and behaviors that affect risk of falls    -  [place] fall precautions as indicated by assessment   - Educate patient/family on patient safety including physical limitations  - Instruct patient to call for assistance with activity based on assessment  - Modify environment to reduce risk of injury  - Consider OT/PT consult to assist with strengthening/mobility  Outcome: Progressing  Goal: Maintain or return to baseline ADL function  Description: INTERVENTIONS:  -  Assess patient's ability to carry out ADLs; assess patient's baseline for ADL function and identify physical deficits which impact ability to perform ADLs (bathing, care of mouth/teeth, toileting, grooming, dressing, etc )  - Assess/evaluate cause of self-care deficits   - Assess range of motion  - Assess patient's mobility; develop plan if impaired  - Assess patient's need for assistive devices and provide as appropriate  - Encourage maximum independence but intervene and supervise when necessary  - Involve family in performance of ADLs  - Assess for home care needs following discharge   - Consider OT consult to assist with ADL evaluation and planning for discharge  - Provide patient education as appropriate  Outcome: Progressing  Goal: Maintain or return mobility status to optimal level  Description: INTERVENTIONS:  - Assess patient's baseline mobility status (ambulation, transfers, stairs, etc )    - Identify cognitive and physical deficits and behaviors that affect mobility  - Identify mobility aids required to assist with transfers and/or ambulation (gait belt, sit-to-stand, lift, walker, cane, etc )  - [place] fall precautions as indicated by assessment  - Record patient progress and toleration of activity level on Mobility SBAR; progress patient to next Phase/Stage  - Instruct patient to call for assistance with activity based on assessment  - Consider rehabilitation consult to assist with strengthening/weightbearing, etc   Outcome: Progressing     Problem: DISCHARGE PLANNING  Goal: Discharge to home or other facility with appropriate resources  Description: INTERVENTIONS:  - Identify barriers to discharge w/patient and caregiver  - Arrange for needed discharge resources and transportation as appropriate  - Identify discharge learning needs (meds, wound care, etc )  - Arrange for interpretive services to assist at discharge as needed  - Refer to Case Management Department for coordinating discharge planning if the patient needs post-hospital services based on physician/advanced practitioner order or complex needs related to functional status, cognitive ability, or social support system  Outcome: Progressing     Problem: Knowledge Deficit  Goal: Patient/family/caregiver demonstrates understanding of disease process, treatment plan, medications, and discharge instructions  Description: Complete learning assessment and assess knowledge base    Interventions:  - Provide teaching at level of understanding  - Provide teaching via preferred learning methods  Outcome: Progressing awaiting bed, no change

## 2021-01-31 NOTE — ASSESSMENT & PLAN NOTE
· New finding:  Soft tissue mass in the ascending colon 3 9 x 2 1 cm  · Patient denies any blood in the stools, weight loss, loss of appetite or other B symptoms  · GI Consulted  · Recommending colonoscopy likely early this week, pending stability of transaminitis and cholecystitis workup  · Family history of colon cancer and pancreatic cancer  · Last colonoscopy about 6 years ago which did not reveal any mass as endorsed by the patient and the daughter at bedside on admit

## 2021-02-01 ENCOUNTER — APPOINTMENT (INPATIENT)
Dept: RADIOLOGY | Facility: HOSPITAL | Age: 86
DRG: 375 | End: 2021-02-01
Payer: COMMERCIAL

## 2021-02-01 PROBLEM — R06.02 SHORTNESS OF BREATH: Status: ACTIVE | Noted: 2021-02-01

## 2021-02-01 LAB
ACTIN IGG SERPL-ACNC: 14 UNITS (ref 0–19)
ALBUMIN SERPL BCP-MCNC: 1.7 G/DL (ref 3.5–5)
ALBUMIN SERPL BCP-MCNC: 1.8 G/DL (ref 3.5–5)
ALP SERPL-CCNC: 349 U/L (ref 46–116)
ALP SERPL-CCNC: 353 U/L (ref 46–116)
ALT SERPL W P-5'-P-CCNC: 696 U/L (ref 12–78)
ALT SERPL W P-5'-P-CCNC: 696 U/L (ref 12–78)
ANION GAP SERPL CALCULATED.3IONS-SCNC: 6 MMOL/L (ref 4–13)
AST SERPL W P-5'-P-CCNC: 648 U/L (ref 5–45)
AST SERPL W P-5'-P-CCNC: 649 U/L (ref 5–45)
BASOPHILS # BLD AUTO: 0.05 THOUSANDS/ΜL (ref 0–0.1)
BASOPHILS NFR BLD AUTO: 1 % (ref 0–1)
BILIRUB DIRECT SERPL-MCNC: 2.35 MG/DL (ref 0–0.2)
BILIRUB SERPL-MCNC: 3 MG/DL (ref 0.2–1)
BILIRUB SERPL-MCNC: 3 MG/DL (ref 0.2–1)
BUN SERPL-MCNC: 8 MG/DL (ref 5–25)
CALCIUM ALBUM COR SERPL-MCNC: 9.5 MG/DL (ref 8.3–10.1)
CALCIUM SERPL-MCNC: 7.7 MG/DL (ref 8.3–10.1)
CHLORIDE SERPL-SCNC: 108 MMOL/L (ref 100–108)
CO2 SERPL-SCNC: 22 MMOL/L (ref 21–32)
CREAT SERPL-MCNC: 0.79 MG/DL (ref 0.6–1.3)
EOSINOPHIL # BLD AUTO: 0.39 THOUSAND/ΜL (ref 0–0.61)
EOSINOPHIL NFR BLD AUTO: 6 % (ref 0–6)
ERYTHROCYTE [DISTWIDTH] IN BLOOD BY AUTOMATED COUNT: 16.2 % (ref 11.6–15.1)
GFR SERPL CREATININE-BSD FRML MDRD: 66 ML/MIN/1.73SQ M
GLUCOSE SERPL-MCNC: 96 MG/DL (ref 65–140)
HCT VFR BLD AUTO: 34.3 % (ref 34.8–46.1)
HETEROPH AB SER QL: NEGATIVE
HGB BLD-MCNC: 11.5 G/DL (ref 11.5–15.4)
IMM GRANULOCYTES # BLD AUTO: 0.04 THOUSAND/UL (ref 0–0.2)
IMM GRANULOCYTES NFR BLD AUTO: 1 % (ref 0–2)
INR PPP: 1.15 (ref 0.84–1.19)
LYMPHOCYTES # BLD AUTO: 1.7 THOUSANDS/ΜL (ref 0.6–4.47)
LYMPHOCYTES NFR BLD AUTO: 24 % (ref 14–44)
MCH RBC QN AUTO: 30 PG (ref 26.8–34.3)
MCHC RBC AUTO-ENTMCNC: 33.5 G/DL (ref 31.4–37.4)
MCV RBC AUTO: 90 FL (ref 82–98)
MONOCYTES # BLD AUTO: 0.85 THOUSAND/ΜL (ref 0.17–1.22)
MONOCYTES NFR BLD AUTO: 12 % (ref 4–12)
NEUTROPHILS # BLD AUTO: 4.01 THOUSANDS/ΜL (ref 1.85–7.62)
NEUTS SEG NFR BLD AUTO: 56 % (ref 43–75)
NRBC BLD AUTO-RTO: 0 /100 WBCS
PLATELET # BLD AUTO: 229 THOUSANDS/UL (ref 149–390)
PMV BLD AUTO: 10.5 FL (ref 8.9–12.7)
POTASSIUM SERPL-SCNC: 3.7 MMOL/L (ref 3.5–5.3)
PROT SERPL-MCNC: 6.4 G/DL (ref 6.4–8.2)
PROT SERPL-MCNC: 6.7 G/DL (ref 6.4–8.2)
PROTHROMBIN TIME: 14.9 SECONDS (ref 11.6–14.5)
RBC # BLD AUTO: 3.83 MILLION/UL (ref 3.81–5.12)
SODIUM SERPL-SCNC: 136 MMOL/L (ref 136–145)
WBC # BLD AUTO: 7.04 THOUSAND/UL (ref 4.31–10.16)

## 2021-02-01 PROCEDURE — 85610 PROTHROMBIN TIME: CPT | Performed by: INTERNAL MEDICINE

## 2021-02-01 PROCEDURE — 99232 SBSQ HOSP IP/OBS MODERATE 35: CPT | Performed by: PHYSICIAN ASSISTANT

## 2021-02-01 PROCEDURE — 99232 SBSQ HOSP IP/OBS MODERATE 35: CPT | Performed by: INTERNAL MEDICINE

## 2021-02-01 PROCEDURE — 80053 COMPREHEN METABOLIC PANEL: CPT | Performed by: INTERNAL MEDICINE

## 2021-02-01 PROCEDURE — 0DBK8ZX EXCISION OF ASCENDING COLON, VIA NATURAL OR ARTIFICIAL OPENING ENDOSCOPIC, DIAGNOSTIC: ICD-10-PCS | Performed by: INTERNAL MEDICINE

## 2021-02-01 PROCEDURE — NC001 PR NO CHARGE: Performed by: PHYSICIAN ASSISTANT

## 2021-02-01 PROCEDURE — 85025 COMPLETE CBC W/AUTO DIFF WBC: CPT | Performed by: INTERNAL MEDICINE

## 2021-02-01 PROCEDURE — 80076 HEPATIC FUNCTION PANEL: CPT | Performed by: INTERNAL MEDICINE

## 2021-02-01 PROCEDURE — 71045 X-RAY EXAM CHEST 1 VIEW: CPT

## 2021-02-01 RX ORDER — POLYETHYLENE GLYCOL 3350, SODIUM CHLORIDE, SODIUM BICARBONATE, POTASSIUM CHLORIDE 420; 11.2; 5.72; 1.48 G/4L; G/4L; G/4L; G/4L
4000 POWDER, FOR SOLUTION ORAL ONCE
Status: COMPLETED | OUTPATIENT
Start: 2021-02-01 | End: 2021-02-01

## 2021-02-01 RX ADMIN — PIPERACILLIN AND TAZOBACTAM 3.38 G: 36; 4.5 INJECTION, POWDER, FOR SOLUTION INTRAVENOUS at 10:18

## 2021-02-01 RX ADMIN — POLYETHYLENE GLYCOL 3350, SODIUM CHLORIDE, SODIUM BICARBONATE AND POTASSIUM CHLORIDE WITH LEMON FLAVOR 4000 ML: 420; 11.2; 5.72; 1.48 POWDER, FOR SOLUTION ORAL at 16:32

## 2021-02-01 RX ADMIN — HEPARIN SODIUM 5000 UNITS: 5000 INJECTION INTRAVENOUS; SUBCUTANEOUS at 21:58

## 2021-02-01 RX ADMIN — HEPARIN SODIUM 5000 UNITS: 5000 INJECTION INTRAVENOUS; SUBCUTANEOUS at 09:12

## 2021-02-01 NOTE — PLAN OF CARE
Problem: Potential for Falls  Goal: Patient will remain free of falls  Description: INTERVENTIONS:  - Assess patient frequently for physical needs  -  Identify cognitive and physical deficits and behaviors that affect risk of falls    -  [place] fall precautions as indicated by assessment   - Educate patient/family on patient safety including physical limitations  - Instruct patient to call for assistance with activity based on assessment  - Modify environment to reduce risk of injury  - Consider OT/PT consult to assist with strengthening/mobility  Outcome: Progressing     Problem: PAIN - ADULT  Goal: Verbalizes/displays adequate comfort level or baseline comfort level  Description: Interventions:  - Encourage patient to monitor pain and request assistance  - Assess pain using appropriate pain scale  - Administer analgesics based on type and severity of pain and evaluate response  - Implement non-pharmacological measures as appropriate and evaluate response  - Consider cultural and social influences on pain and pain management  - Notify physician/advanced practitioner if interventions unsuccessful or patient reports new pain  Outcome: Progressing     Problem: INFECTION - ADULT  Goal: Absence or prevention of progression during hospitalization  Description: INTERVENTIONS:  - Assess and monitor for signs and symptoms of infection  - Monitor lab/diagnostic results  - Monitor all insertion sites, i e  indwelling lines, tubes, and drains  - Monitor endotracheal if appropriate and nasal secretions for changes in amount and color  - [place] appropriate cooling/warming therapies per order  - Administer medications as ordered  - Instruct and encourage patient and family to use good hand hygiene technique  - Identify and instruct in appropriate isolation precautions for identified infection/condition  Outcome: Progressing  Goal: Absence of fever/infection during neutropenic period  Description: INTERVENTIONS:  - Monitor WBC    Outcome: Progressing     Problem: SAFETY ADULT  Goal: Patient will remain free of falls  Description: INTERVENTIONS:  - Assess patient frequently for physical needs  -  Identify cognitive and physical deficits and behaviors that affect risk of falls    -  [place] fall precautions as indicated by assessment   - Educate patient/family on patient safety including physical limitations  - Instruct patient to call for assistance with activity based on assessment  - Modify environment to reduce risk of injury  - Consider OT/PT consult to assist with strengthening/mobility  Outcome: Progressing  Goal: Maintain or return to baseline ADL function  Description: INTERVENTIONS:  -  Assess patient's ability to carry out ADLs; assess patient's baseline for ADL function and identify physical deficits which impact ability to perform ADLs (bathing, care of mouth/teeth, toileting, grooming, dressing, etc )  - Assess/evaluate cause of self-care deficits   - Assess range of motion  - Assess patient's mobility; develop plan if impaired  - Assess patient's need for assistive devices and provide as appropriate  - Encourage maximum independence but intervene and supervise when necessary  - Involve family in performance of ADLs  - Assess for home care needs following discharge   - Consider OT consult to assist with ADL evaluation and planning for discharge  - Provide patient education as appropriate  Outcome: Progressing  Goal: Maintain or return mobility status to optimal level  Description: INTERVENTIONS:  - Assess patient's baseline mobility status (ambulation, transfers, stairs, etc )    - Identify cognitive and physical deficits and behaviors that affect mobility  - Identify mobility aids required to assist with transfers and/or ambulation (gait belt, sit-to-stand, lift, walker, cane, etc )  - [place] fall precautions as indicated by assessment  - Record patient progress and toleration of activity level on Mobility SBAR; progress patient to next Phase/Stage  - Instruct patient to call for assistance with activity based on assessment  - Consider rehabilitation consult to assist with strengthening/weightbearing, etc   Outcome: Progressing     Problem: DISCHARGE PLANNING  Goal: Discharge to home or other facility with appropriate resources  Description: INTERVENTIONS:  - Identify barriers to discharge w/patient and caregiver  - Arrange for needed discharge resources and transportation as appropriate  - Identify discharge learning needs (meds, wound care, etc )  - Arrange for interpretive services to assist at discharge as needed  - Refer to Case Management Department for coordinating discharge planning if the patient needs post-hospital services based on physician/advanced practitioner order or complex needs related to functional status, cognitive ability, or social support system  Outcome: Progressing     Problem: Knowledge Deficit  Goal: Patient/family/caregiver demonstrates understanding of disease process, treatment plan, medications, and discharge instructions  Description: Complete learning assessment and assess knowledge base    Interventions:  - Provide teaching at level of understanding  - Provide teaching via preferred learning methods  Outcome: Progressing

## 2021-02-01 NOTE — PROGRESS NOTES
Progress Note - Edel Smalls 80 y o  female MRN: [id number]  Unit/Bed#: -01 Encounter: [id number]    Subjective:   Mary Grace Knows is feeling weary from all of the testing  She has missed several meals  Her appetite is fair  Her strength is poor and declining  She had a loose bowel movement this morning but is not aware of any blood  She denies nausea or vomiting  He notes some difficulty catching her breath but has no chest pain or palpitations  She denies PND or orthopnea  She notes lower abdominal discomfort  No dysuria  All other ROS are negative  Objective:   Vitals: Blood pressure 126/65, pulse 71, temperature 98 5 °F (36 9 °C), resp  rate 18, height 4' 11" (1 499 m), weight 56 2 kg (123 lb 14 4 oz), SpO2 92 %  ,Body mass index is 25 02 kg/m²  SPO2 RA Rest      ED to Hosp-Admission (Current) from 1/28/2021 in [address] [place] [address] 4th Floor Med Surg Unit   SpO2  92 %   SpO2 Activity  At Rest   O2 Device  None (Room air)   O2 Flow Rate          I&O: No intake or output data in the 24 hours ending 02/01/21 0745      Physical Exam:       General Appearance:    Alert, cooperative, elderly and mildly ill-appearing but no distress   Head:    Normocephalic, without obvious abnormality, atraumatic   Eyes:    PERRL, conjunctiva/corneas clear, EOM's intact       Nose:   Moist mucous membranes, no drainage or sinus tenderness   Throat:   No tenderness, no exudates   Neck:   Supple, symmetrical, trachea midline, no JVD   Lungs:     Poor effort, decreased breath sounds at bases, faint left crackles, respirations unlabored   Heart:    Regular rate and rhythm, S1 and S2 normal, no murmur, rub   or gallop   Abdomen: Soft, diffusely tender to palpate, positive bowel sounds, no masses, no organomegaly   Extremities:  No pedal edema, calf tenderness  Distal pulses palpable  Neurologic:     CNII-XII intact        Invasive Devices     Peripheral Intravenous Line            Peripheral IV 01/28/21 Right Forearm 3 days                      Social History  reviewed  Family History   Problem Relation Age of Onset    Diabetes type II Mother     Pancreatic cancer Father     Colon cancer Sister     reviewed    Meds:  Current Facility-Administered Medications   Medication Dose Route Frequency Provider Last Rate Last Admin    Brinzolamide-Brimonidine 1-0 2 % SUSP 1 drop  1 drop Both Eyes BID Janine Cole MD   1 drop at 01/31/21 1930    heparin (porcine) subcutaneous injection 5,000 Units  5,000 Units Subcutaneous Q12H [address] Janine Cole MD   5,000 Units at 01/31/21 2141    Latanoprostene Bunod 0 024 % SOLN 1 drop  1 drop Both Eyes HS Janine Cole MD   1 drop at 01/31/21 2142    Netarsudil Dimesylate 0 02 % SOLN 1 drop  1 drop Both Eyes HS Janine Cole MD   1 drop at 01/31/21 2142    ondansetron (ZOFRAN) injection 4 mg  4 mg Intravenous Q6H PRN Janine Cole MD        piperacillin-tazobactam (ZOSYN) 3 375 g in sodium chloride 0 9 % 100 mL IVPB  3 375 g Intravenous Q8H Janine Cole  mL/hr at 01/31/21 2325 3 375 g at 01/31/21 2325    sodium chloride 0 9 % infusion  100 mL/hr Intravenous Continuous Janine Cole  mL/hr at 01/31/21 1925 100 mL/hr at 01/31/21 1925      Medications Prior to Admission   Medication    acetaminophen (TYLENOL) 500 mg tablet    atorvastatin (LIPITOR) 20 mg tablet    cetirizine (ZyrTEC) 10 mg tablet    Cholecalciferol ([employer] VITAMIN D) 2000 units CAPS    mirtazapine (REMERON) 7 5 MG tablet    Multiple Vitamins-Minerals (CENTRUM SILVER 50+WOMEN) TABS    RHOPRESSA 0 02 % SOLN    SIMBRINZA 1-0 2 % SUSP    VYZULTA 0 024 % SOLN       Labs:  Results from last 7 days   Lab Units 02/01/21  0513 01/31/21  0444 01/30/21  0502   WBC Thousand/uL 7 04 4 92 5 82   HEMOGLOBIN g/dL 11 5 11 3* 11 2*   HEMATOCRIT % 34 3* 34 1* 34 8   PLATELETS Thousands/uL 229 217 210   NEUTROS PCT % 56 49 50   LYMPHS PCT % 24 30 30   MONOS PCT % 12 13* 13*   EOS PCT % 6 6 6     Results from last 7 days   Lab Units 02/01/21  0513 01/31/21  0444 01/30/21  0502   POTASSIUM mmol/L 3 7 3 8 3 4*   CHLORIDE mmol/L 108 109* 106   CO2 mmol/L 22 22 23   BUN mg/dL 8 11 14   CREATININE mg/dL 0 79 0 89 1 00   CALCIUM mg/dL 7 7* 8 0* 7 9*   ALK PHOS U/L 349* 321* 297*   ALT U/L 696* 756* 868*   AST U/L 648* 688* 777*     Lab Results   Component Value Date    TROPONINI <0 02 01/28/2021     Results from last 7 days   Lab Units 02/01/21  0513 01/30/21  0502 01/29/21  0451   INR  1 15 1 34* 1 18     Lab Results   Component Value Date    URINECX <10,000 cfu/ml  10/28/2019       Imaging:  No results found for this or any previous visit  No results found for this or any previous visit  VTE Pharmacologic Prophylaxis: Heparin      Code Status:   Level 3 - DNAR and DNI    Assessment:  Principal Problem:    Acalculous cholecystitis  Active Problems:    Esophageal reflux    Hyperlipidemia    Macular degeneration    Colonic mass    Abnormal finding on urinalysis    Elevated blood pressure reading    Anxiety    Transaminitis    Shortness of breath  Resolved Problems:    * No resolved hospital problems  *      Plan:  No evidence cholecystitis on HIDA scan, CT with concern for cholangitis but not meeting criteria  Patient remains afebrile without leukocytosis  Procalcitonin minimally elevated at 0 51 then 0 48  Patient remains on Zosyn  Will discuss further with GI  Acute hepatitis presumably secondary to chronic Tylenol overdose status post course of NAC with gradually improving transaminases, though alk-phos and bilirubin increased slightly  Await further GI recommendations  Ascending colon mass with surrounding adenopathy concerning for colon cancer-patient will need colonoscopy at some point, will discuss timing with GI      Dyspnea with abnormal lung exam-incentive spirometry, pulmonary toilet, check chest x-ray    Anxiety and insomnia-continue mirtazapine    Hyperlipidemia-statin on hold    Disposition-not stable for discharge pending further workup and treatment as above      Jasmin Gaines MD  6/0/6436,7:22 AM

## 2021-02-01 NOTE — NURSING NOTE
Pt refusing to have héctor placed back on  The battery and sensor both have been replaced multiple times but it keeps alarming and keeping the pt awake  SLIM made aware; Charge RN also aware

## 2021-02-01 NOTE — PLAN OF CARE
Problem: Potential for Falls  Goal: Patient will remain free of falls  Description: INTERVENTIONS:  - Assess patient frequently for physical needs  -  Identify cognitive and physical deficits and behaviors that affect risk of falls    -  [place] fall precautions as indicated by assessment   - Educate patient/family on patient safety including physical limitations  - Instruct patient to call for assistance with activity based on assessment  - Modify environment to reduce risk of injury  - Consider OT/PT consult to assist with strengthening/mobility  Outcome: Progressing     Problem: PAIN - ADULT  Goal: Verbalizes/displays adequate comfort level or baseline comfort level  Description: Interventions:  - Encourage patient to monitor pain and request assistance  - Assess pain using appropriate pain scale  - Administer analgesics based on type and severity of pain and evaluate response  - Implement non-pharmacological measures as appropriate and evaluate response  - Consider cultural and social influences on pain and pain management  - Notify physician/advanced practitioner if interventions unsuccessful or patient reports new pain  Outcome: Progressing     Problem: INFECTION - ADULT  Goal: Absence or prevention of progression during hospitalization  Description: INTERVENTIONS:  - Assess and monitor for signs and symptoms of infection  - Monitor lab/diagnostic results  - Monitor all insertion sites, i e  indwelling lines, tubes, and drains  - Monitor endotracheal if appropriate and nasal secretions for changes in amount and color  - [place] appropriate cooling/warming therapies per order  - Administer medications as ordered  - Instruct and encourage patient and family to use good hand hygiene technique  - Identify and instruct in appropriate isolation precautions for identified infection/condition  Outcome: Progressing  Goal: Absence of fever/infection during neutropenic period  Description: INTERVENTIONS:  - Monitor WBC    Outcome: Progressing     Problem: SAFETY ADULT  Goal: Patient will remain free of falls  Description: INTERVENTIONS:  - Assess patient frequently for physical needs  -  Identify cognitive and physical deficits and behaviors that affect risk of falls    -  [place] fall precautions as indicated by assessment   - Educate patient/family on patient safety including physical limitations  - Instruct patient to call for assistance with activity based on assessment  - Modify environment to reduce risk of injury  - Consider OT/PT consult to assist with strengthening/mobility  Outcome: Progressing  Goal: Maintain or return to baseline ADL function  Description: INTERVENTIONS:  -  Assess patient's ability to carry out ADLs; assess patient's baseline for ADL function and identify physical deficits which impact ability to perform ADLs (bathing, care of mouth/teeth, toileting, grooming, dressing, etc )  - Assess/evaluate cause of self-care deficits   - Assess range of motion  - Assess patient's mobility; develop plan if impaired  - Assess patient's need for assistive devices and provide as appropriate  - Encourage maximum independence but intervene and supervise when necessary  - Involve family in performance of ADLs  - Assess for home care needs following discharge   - Consider OT consult to assist with ADL evaluation and planning for discharge  - Provide patient education as appropriate  Outcome: Progressing  Goal: Maintain or return mobility status to optimal level  Description: INTERVENTIONS:  - Assess patient's baseline mobility status (ambulation, transfers, stairs, etc )    - Identify cognitive and physical deficits and behaviors that affect mobility  - Identify mobility aids required to assist with transfers and/or ambulation (gait belt, sit-to-stand, lift, walker, cane, etc )  - [place] fall precautions as indicated by assessment  - Record patient progress and toleration of activity level on Mobility SBAR; progress patient to next Phase/Stage  - Instruct patient to call for assistance with activity based on assessment  - Consider rehabilitation consult to assist with strengthening/weightbearing, etc   Outcome: Progressing     Problem: DISCHARGE PLANNING  Goal: Discharge to home or other facility with appropriate resources  Description: INTERVENTIONS:  - Identify barriers to discharge w/patient and caregiver  - Arrange for needed discharge resources and transportation as appropriate  - Identify discharge learning needs (meds, wound care, etc )  - Arrange for interpretive services to assist at discharge as needed  - Refer to Case Management Department for coordinating discharge planning if the patient needs post-hospital services based on physician/advanced practitioner order or complex needs related to functional status, cognitive ability, or social support system  Outcome: Progressing     Problem: Knowledge Deficit  Goal: Patient/family/caregiver demonstrates understanding of disease process, treatment plan, medications, and discharge instructions  Description: Complete learning assessment and assess knowledge base    Interventions:  - Provide teaching at level of understanding  - Provide teaching via preferred learning methods  Outcome: Progressing

## 2021-02-01 NOTE — PROGRESS NOTES
Progress Note  Nhi Mckinney Cuda 80 y o  female MRN: [id number]  Unit/Bed#: -01 Encounter: [id number]    Subjective:  Patient reports some mild lower abdominal discomfort this am   No RUQ or epigastric pain  No fevers  No nausea or vomiting  No chest pain  Objective:     Vitals:   Vitals:    02/01/21 0658   BP: 126/65   Pulse: 71   Resp: 18   Temp: 98 5 °F (36 9 °C)   SpO2: 92%   ,Body mass index is 25 02 kg/m²  No intake or output data in the 24 hours ending 02/01/21 1326    Physical Exam:     General Appearance: Alert, appears stated age and cooperative  Lungs: Decreased breath sounds  Heart: Regular rate and rhythm, S1, S2 normal, no murmur, click, rub or gallop  Abdomen: Soft, non-tender, non-distended; bowel sounds normal; no masses or no organomegaly  Extremities: No cyanosis, edema    Invasive Devices     Peripheral Intravenous Line            Peripheral IV 02/01/21 Left Antecubital less than 1 day                Lab Results:  No results displayed because visit has over 200 results  Imaging Studies:   I have personally reviewed pertinent imaging studies  Xr Chest Portable    Result Date: 2/1/2021  Narrative: CHEST INDICATION:   Dyspnea  COMPARISON:  CT abdomen pelvis 1/28/2021 EXAM PERFORMED/VIEWS:  XR CHEST PORTABLE FINDINGS: Cardiomediastinal silhouette appears unremarkable  Mild bibasilar atelectasis  Left hemidiaphragm is mildly elevated  No pleural effusion or pneumothorax  Osseous structures appear within normal limits for patient age  Impression: Mild bibasilar atelectasis  Workstation performed: [id number]     Nm Hepatobiliary W Rx    Result Date: 1/31/2021  Narrative: HEPATOBILIARY SCAN INDICATION:  Abnormal CT and MRI appearance of the gallbladder    Further evaluation for cholecystitis COMPARISON:  CT 1/28/2021, MRI 1/29/2021 TECHNIQUE:   Following the intravenous administration of 4 5 mCi Tc-99m labeled mebrofenin, dynamic abdominal images were obtained over a 60 minute time period  Images were performed in AP projection  FINDINGS: There is normal visualization of the liver parenchyma, common bile duct and small bowel  Initially, the gallbladder did not visualize  Therefore, 2 0 mg of morphine was administered intravenously x1 in the nuclear medicine department  Following morphine administration, prompt visualization of the gallbladder occurred     Impression: Following administration of IV morphine, prompt visualization of the gallbladder, indicating a patent cystic duct  No scintigraphic evidence of acute cholecystitis Workstation performed: [id number]     Mri Abdomen W Wo Contrast And Mrcp    Result Date: 1/30/2021  Narrative: MRI OF THE ABDOMEN WITH AND WITHOUT CONTRAST WITH MRCP INDICATION:  Assess CBD with possible a calculus cholecystitis on CT scan  COMPARISON: CT scan from yesterday  TECHNIQUE:  The following pulse sequences were obtained:  Coronal and axial T2 with TE of 90 and 180 respectively, axial T2 with fat saturation, axial FIESTA fat-sat, axial T1-weighted in-and-out-of phase, axial DWI/ADC, pre-contrast axial T1 with fat saturation, post-contrast dynamic axial T1 with fat saturation at 20, 70, and 180 seconds, followed by coronal and 7 minute delayed axial T1 with fat saturation  3D MRCP images were obtained with radial thick slabs and projections  3D rendering was performed from the acquisition scanner  IV Contrast:  5 mL of Gadobutrol injection (SINGLE-DOSE) FINDINGS: LOWER CHEST:   Unremarkable  LIVER: Normal in size and configuration  No suspicious mass  The hepatic veins and portal veins are patent  BILE DUCTS:  No intrahepatic or extrahepatic bile duct dilation  Common bile duct is normal in caliber  No choledocholithiasis, biliary stricture or suspicious mass  GALLBLADDER:  Gallbladder wall thickening  No stones  PANCREAS:  Normal  No main pancreatic ductal dilation   ADRENAL GLANDS:  Normal  SPLEEN:  Normal  KIDNEYS/PROXIMAL URETERS:  No hydroureteronephrosis  No suspicious renal mass  BOWEL:   Enhancing focus in the ascending colon reidentified suspicious for neoplasm measuring approximately 3 5 cm on image 14/323  PERITONEUM/RETROPERITONEUM:  No ascites  LYMPH NODES:  Upper abdominal adenopathy including a portacaval node measuring 1 2 cm  VASCULAR STRUCTURES:  No aneurysm  ABDOMINAL WALL:  Unremarkable  OSSEOUS STRUCTURES:  No suspicious osseous lesion  Impression: Gallbladder has an abnormal appearance with wall thickening and/or pericholecystic fluid  No stones identified  Possibility is raised of acalculous cholecystitis  CBD is normal in diameter  There is mild nonspecific enhancement of the distal CBD which may represent mild cholangitis  Upper abdominal portacaval lymphadenopathy measuring 1 2 cm  Enhancing focus in the ascending colon wall reidentified suspicious for neoplasm measuring approximately 3 5 cm on image 14/323  Workstation performed: [id number]     Ct Abdomen Pelvis With Contrast    Result Date: 1/28/2021  Narrative: CT ABDOMEN AND PELVIS WITH IV CONTRAST INDICATION:   Abdominal pain, acute, nonlocalized pain,l transaminitis  COMPARISON:  None  TECHNIQUE:  CT examination of the abdomen and pelvis was performed  Axial, sagittal, and coronal 2D reformatted images were created from the source data and submitted for interpretation  Radiation dose length product (DLP) for this visit:  538 65 mGy-cm   This examination, like all CT scans performed in the [place], was performed utilizing techniques to minimize radiation dose exposure, including the use of iterative  reconstruction and automated exposure control  IV Contrast:  100 mL of iohexol (OMNIPAQUE) Enteric Contrast:  Enteric contrast was not administered  FINDINGS: ABDOMEN LOWER CHEST:  No clinically significant abnormality identified in the visualized lower chest  LIVER/BILIARY TREE:  Mild intrahepatic ductal dilatation    Extrahepatic CBD with mild mucosal enhancement  GALLBLADDER:  No calcified stones identified  Minimal pericholecystic fluid  SPLEEN:  Unremarkable  PANCREAS:  Unremarkable  ADRENAL GLANDS:  Unremarkable  KIDNEYS/URETERS:  Unremarkable  No hydronephrosis  STOMACH AND BOWEL:  Colonic diverticulosis  Soft tissue mass in the ascending colon just above the ileocecal valve consistent with neoplasm measuring 3 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 2 1 cm  Multiple small mesenteric nodes are noted adjacent to the mass seen on image 601/65  APPENDIX:  No findings to suggest appendicitis  ABDOMINOPELVIC CAVITY:  No ascites  No pneumoperitoneum  Small mesenteric nodes in the right lower quadrant adjacent to the colon mass  VESSELS:  Unremarkable for patient's age  PELVIS REPRODUCTIVE ORGANS:  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  URINARY BLADDER:  Unremarkable  ABDOMINAL WALL/INGUINAL REGIONS:  Unremarkable  OSSEOUS STRUCTURES:  No acute fracture or destructive osseous lesion  Advanced degenerative changes of the lumbar spine  Impression: Soft tissue mass in the ascending colon just above the ileocecal valve consistent with neoplasm measuring 3 9 x 2 1 cm  Multiple small mesenteric nodes are noted adjacent to the mass seen on image 601/65  Recommend colonoscopy  Pericholecystic fluid  No calcified gallstones  Possible acalculous cholecystitis  Correlate clinically  Consider ultrasound if any clinical ambiguity  Mild nonspecific enhancement of the extrahepatic CBD  This can be seen in the setting of cholangitis  The study was marked in [facility] for immediate notification  Workstation performed: [id number]         Assessment & Plan    Elevated LFTs    - Elevated LFTs are suspected to be secondary to chronic Tylenol toxicity  - She is s/p a NAC course  - MRI/MRCP did not show CBD dilation, no evidence of a stricture/stone  - Acute hepatitis panel was negative  Follow up labs for AIH, EBV    - HIDA Scan was negative    - LFTs trending down with TB mildly increasing; continue to trend CMP/INR daily   - Patient does not meet criteria for cholangitis  Will discontinue Zosyn      Colonic Mass  Abnormal CT  - CT on admission noted a soft tissue mass in the ascending colon just above the ileocecal valve consistent with possible neoplasm   - Last colonoscopy was about 7 years ago  She has a sister with a history of colon cancer   - Will plan for colonoscopy tomorrow to investigate  Clear liquid diet  Golytely prep this evening  NPO after midnight  The patient will be seen by Dr Jose Miguel Lopez PA-C  2/1/2021,1:26 PM

## 2021-02-01 NOTE — PROGRESS NOTES
Progress Note - General Surgery   [facility] Slim 80 y o  female MRN: [id number]  Unit/Bed#: -01 Encounter: [id number]    Assessment/Plan  Annalisa Martinez is a 80 y o  female     Abnormal LFTs, surgery consulted to r/o acute acalculous cholecystitis  1/29 MRCP: abnormal appearance of gallbladder with wall thickening and/or pericholecystic fluid, no stones were identified, with raise possibility of acalculous cholecystitis   1/30 HIDA: normal visualization of GB    AVSS, WBC WNL  LFTs still elevated (648 AST, 696 ALT, 348 AlkP, TBili 3 00)      Due to HIDA results, patient is does not have acute cholecystitis, and no stones are present, so cholecystectomy vs perc drainage not required  Patient can be signed off on surgical standpoint  GI will continue to follow transaminases  Will discuss if IV Zosyn can be d/c since patient is no longer meeting criteria for cholangitis  Subjective/Objective     Subjective: Patient doing well, no acute events overnight  Mild SOB  Mild abdominal discomfort, 3-4/10 in lower abdomen  Denies N/V, fevers/chills, CP, palpations, LE tenderness  She has not eaten and has good appetite  Objective:     Blood pressure 126/65, pulse 71, temperature 98 5 °F (36 9 °C), resp  rate 18, height 4' 11" (1 499 m), weight 56 2 kg (123 lb 14 4 oz), SpO2 92 %  ,Body mass index is 25 02 kg/m²  No intake or output data in the 24 hours ending 02/01/21 0755    Invasive Devices     Peripheral Intravenous Line            Peripheral IV 01/28/21 Right Forearm 3 days                Physical Exam:   General Appearance: Alert and oriented, in no acute distress, cooperative, laying upright in bed  Head: Normocephalic, atraumatic  Neck: Supple, midline  Eyes: Lids normal, conjunctiva clear   Mouth: Dry, moist mucous membranes  Lungs: CTA bilaterally, normal lung effort, without wheezes, rhonchi, or crackles  Cardiovascular: RRR, normal S1 and S2, no murmurs, no abnormal heaves or thrills     Abdomen: Non-distended, active BS present, soft abdomen, mild tenderness with palpation to epigastric and suprapubic regions, without guarding or rebound  Extremities: No calf tenderness  Skin: Warm, dry BLE  No pallor, erythema, or cyanosis  No LE edema  Neuro: AAOx3, normal affect    Lab, Imaging and other studies:  I have personally reviewed pertinent lab results  , CBC:   Lab Results   Component Value Date    WBC 7 04 02/01/2021    HGB 11 5 02/01/2021    HCT 34 3 (L) 02/01/2021    MCV 90 02/01/2021     02/01/2021    MCH 30 0 02/01/2021    MCHC 33 5 02/01/2021    RDW 16 2 (H) 02/01/2021    MPV 10 5 02/01/2021    NRBC 0 02/01/2021   , CMP:   Lab Results   Component Value Date    SODIUM 136 02/01/2021    K 3 7 02/01/2021     02/01/2021    CO2 22 02/01/2021    BUN 8 02/01/2021    CREATININE 0 79 02/01/2021    CALCIUM 7 7 (L) 02/01/2021     (H) 02/01/2021     (H) 02/01/2021     (H) 02/01/2021     (H) 02/01/2021    ALKPHOS 349 (H) 02/01/2021    ALKPHOS 353 (H) 02/01/2021    EGFR 66 02/01/2021   , Coagulation:   Lab Results   Component Value Date    INR 1 15 02/01/2021   , Urinalysis: No results found for: Joneen Means, SPECGRAV, PHUR, LEUKOCYTESUR, NITRITE, PROTEINUA, GLUCOSEU, KETONESU, BILIRUBINUR, BLOODU, Amylase: No results found for: AMYLASE, Lipase: No results found for: LIPASE   Nm Hepatobiliary W Rx    Result Date: 1/31/2021  Impression: Following administration of IV morphine, prompt visualization of the gallbladder, indicating a patent cystic duct  No scintigraphic evidence of acute cholecystitis Workstation performed: [id number]     Mri Abdomen W Wo Contrast And Mrcp    Result Date: 1/30/2021  Impression: Gallbladder has an abnormal appearance with wall thickening and/or pericholecystic fluid  No stones identified  Possibility is raised of acalculous cholecystitis  CBD is normal in diameter    There is mild nonspecific enhancement of the distal CBD which may represent mild cholangitis  Upper abdominal portacaval lymphadenopathy measuring 1 2 cm  Enhancing focus in the ascending colon wall reidentified suspicious for neoplasm measuring approximately 3 5 cm on image 14/323  Workstation performed: [id number]     Ct Abdomen Pelvis With Contrast    Result Date: 1/28/2021  Impression: Soft tissue mass in the ascending colon just above the ileocecal valve consistent with neoplasm measuring 3 9 x 2 1 cm  Multiple small mesenteric nodes are noted adjacent to the mass seen on image 601/65  Recommend colonoscopy  Pericholecystic fluid  No calcified gallstones  Possible acalculous cholecystitis  Correlate clinically  Consider ultrasound if any clinical ambiguity  Mild nonspecific enhancement of the extrahepatic CBD  This can be seen in the setting of cholangitis  The study was marked in [facility] for immediate notification   Workstation performed: [id number]     VTE Pharmacologic Prophylaxis: Heparin  VTE Mechanical Prophylaxis: sequential compression device    Recent Results (from the past 36 hour(s))   Comprehensive metabolic panel    Collection Time: 01/31/21  4:44 AM   Result Value Ref Range    Sodium 137 136 - 145 mmol/L    Potassium 3 8 3 5 - 5 3 mmol/L    Chloride 109 (H) 100 - 108 mmol/L    CO2 22 21 - 32 mmol/L    ANION GAP 6 4 - 13 mmol/L    BUN 11 5 - 25 mg/dL    Creatinine 0 89 0 60 - 1 30 mg/dL    Glucose 92 65 - 140 mg/dL    Calcium 8 0 (L) 8 3 - 10 1 mg/dL    Corrected Calcium 9 8 8 3 - 10 1 mg/dL     (H) 5 - 45 U/L     (H) 12 - 78 U/L    Alkaline Phosphatase 321 (H) 46 - 116 U/L    Total Protein 6 2 (L) 6 4 - 8 2 g/dL    Albumin 1 8 (L) 3 5 - 5 0 g/dL    Total Bilirubin 2 00 (H) 0 20 - 1 00 mg/dL    eGFR 57 ml/min/1 73sq m   CBC and differential    Collection Time: 01/31/21  4:44 AM   Result Value Ref Range    WBC 4 92 4 31 - 10 16 Thousand/uL    RBC 3 80 (L) 3 81 - 5 12 Million/uL    Hemoglobin 11 3 (L) 11 5 - 15 4 g/dL    Hematocrit 34 1 (L) 34 8 - 46 1 %    MCV 90 82 - 98 fL    MCH 29 7 26 8 - 34 3 pg    MCHC 33 1 31 4 - 37 4 g/dL    RDW 16 0 (H) 11 6 - 15 1 %    MPV 10 7 8 9 - 12 7 fL    Platelets 894 262 - 964 Thousands/uL    nRBC 0 /100 WBCs    Neutrophils Relative 49 43 - 75 %    Immat GRANS % 1 0 - 2 %    Lymphocytes Relative 30 14 - 44 %    Monocytes Relative 13 (H) 4 - 12 %    Eosinophils Relative 6 0 - 6 %    Basophils Relative 1 0 - 1 %    Neutrophils Absolute 2 44 1 85 - 7 62 Thousands/µL    Immature Grans Absolute 0 03 0 00 - 0 20 Thousand/uL    Lymphocytes Absolute 1 46 0 60 - 4 47 Thousands/µL    Monocytes Absolute 0 65 0 17 - 1 22 Thousand/µL    Eosinophils Absolute 0 30 0 00 - 0 61 Thousand/µL    Basophils Absolute 0 04 0 00 - 0 10 Thousands/µL   Comprehensive metabolic panel    Collection Time: 02/01/21  5:13 AM   Result Value Ref Range    Sodium 136 136 - 145 mmol/L    Potassium 3 7 3 5 - 5 3 mmol/L    Chloride 108 100 - 108 mmol/L    CO2 22 21 - 32 mmol/L    ANION GAP 6 4 - 13 mmol/L    BUN 8 5 - 25 mg/dL    Creatinine 0 79 0 60 - 1 30 mg/dL    Glucose 96 65 - 140 mg/dL    Calcium 7 7 (L) 8 3 - 10 1 mg/dL    Corrected Calcium 9 5 8 3 - 10 1 mg/dL     (H) 5 - 45 U/L     (H) 12 - 78 U/L    Alkaline Phosphatase 349 (H) 46 - 116 U/L    Total Protein 6 4 6 4 - 8 2 g/dL    Albumin 1 7 (L) 3 5 - 5 0 g/dL    Total Bilirubin 3 00 (H) 0 20 - 1 00 mg/dL    eGFR 66 ml/min/1 73sq m   Protime-INR    Collection Time: 02/01/21  5:13 AM   Result Value Ref Range    Protime 14 9 (H) 11 6 - 14 5 seconds    INR 1 15 0 84 - 1 19   CBC and differential    Collection Time: 02/01/21  5:13 AM   Result Value Ref Range    WBC 7 04 4 31 - 10 16 Thousand/uL    RBC 3 83 3 81 - 5 12 Million/uL    Hemoglobin 11 5 11 5 - 15 4 g/dL    Hematocrit 34 3 (L) 34 8 - 46 1 %    MCV 90 82 - 98 fL    MCH 30 0 26 8 - 34 3 pg    MCHC 33 5 31 4 - 37 4 g/dL    RDW 16 2 (H) 11 6 - 15 1 %    MPV 10 5 8 9 - 12 7 fL    Platelets 462 436 - 330 Thousands/uL    nRBC 0 /100 WBCs    Neutrophils Relative 56 43 - 75 %    Immat GRANS % 1 0 - 2 %    Lymphocytes Relative 24 14 - 44 %    Monocytes Relative 12 4 - 12 %    Eosinophils Relative 6 0 - 6 %    Basophils Relative 1 0 - 1 %    Neutrophils Absolute 4 01 1 85 - 7 62 Thousands/µL    Immature Grans Absolute 0 04 0 00 - 0 20 Thousand/uL    Lymphocytes Absolute 1 70 0 60 - 4 47 Thousands/µL    Monocytes Absolute 0 85 0 17 - 1 22 Thousand/µL    Eosinophils Absolute 0 39 0 00 - 0 61 Thousand/µL    Basophils Absolute 0 05 0 00 - 0 10 Thousands/µL   ROSIBEL Sanchez  2/1/2021

## 2021-02-02 ENCOUNTER — APPOINTMENT (INPATIENT)
Dept: GASTROENTEROLOGY | Facility: HOSPITAL | Age: 86
DRG: 375 | End: 2021-02-02
Payer: COMMERCIAL

## 2021-02-02 ENCOUNTER — APPOINTMENT (INPATIENT)
Dept: CT IMAGING | Facility: HOSPITAL | Age: 86
DRG: 375 | End: 2021-02-02
Payer: COMMERCIAL

## 2021-02-02 ENCOUNTER — ANESTHESIA (INPATIENT)
Dept: GASTROENTEROLOGY | Facility: HOSPITAL | Age: 86
DRG: 375 | End: 2021-02-02
Payer: COMMERCIAL

## 2021-02-02 ENCOUNTER — ANESTHESIA EVENT (INPATIENT)
Dept: GASTROENTEROLOGY | Facility: HOSPITAL | Age: 86
DRG: 375 | End: 2021-02-02
Payer: COMMERCIAL

## 2021-02-02 VITALS — HEART RATE: 64 BPM

## 2021-02-02 LAB
ALBUMIN SERPL BCP-MCNC: 2 G/DL (ref 3.5–5)
ALP SERPL-CCNC: 356 U/L (ref 46–116)
ALT SERPL W P-5'-P-CCNC: 663 U/L (ref 12–78)
ANION GAP SERPL CALCULATED.3IONS-SCNC: 3 MMOL/L (ref 4–13)
AST SERPL W P-5'-P-CCNC: 662 U/L (ref 5–45)
BASOPHILS # BLD AUTO: 0.07 THOUSANDS/ΜL (ref 0–0.1)
BASOPHILS NFR BLD AUTO: 1 % (ref 0–1)
BILIRUB DIRECT SERPL-MCNC: 3.15 MG/DL (ref 0–0.2)
BILIRUB SERPL-MCNC: 3.9 MG/DL (ref 0.2–1)
BUN SERPL-MCNC: 7 MG/DL (ref 5–25)
CALCIUM SERPL-MCNC: 8.4 MG/DL (ref 8.3–10.1)
CEA SERPL-MCNC: 5.3 NG/ML (ref 0–3)
CHLORIDE SERPL-SCNC: 106 MMOL/L (ref 100–108)
CO2 SERPL-SCNC: 28 MMOL/L (ref 21–32)
CREAT SERPL-MCNC: 0.7 MG/DL (ref 0.6–1.3)
EBV NA IGG SER IA-ACNC: >600 U/ML (ref 0–17.9)
EBV VCA IGG SER IA-ACNC: >600 U/ML (ref 0–17.9)
EBV VCA IGM SER IA-ACNC: <36 U/ML (ref 0–35.9)
EOSINOPHIL # BLD AUTO: 0.34 THOUSAND/ΜL (ref 0–0.61)
EOSINOPHIL NFR BLD AUTO: 5 % (ref 0–6)
ERYTHROCYTE [DISTWIDTH] IN BLOOD BY AUTOMATED COUNT: 16.3 % (ref 11.6–15.1)
GFR SERPL CREATININE-BSD FRML MDRD: 76 ML/MIN/1.73SQ M
GLUCOSE SERPL-MCNC: 83 MG/DL (ref 65–140)
HCT VFR BLD AUTO: 37.2 % (ref 34.8–46.1)
HGB BLD-MCNC: 12.2 G/DL (ref 11.5–15.4)
IMM GRANULOCYTES # BLD AUTO: 0.04 THOUSAND/UL (ref 0–0.2)
IMM GRANULOCYTES NFR BLD AUTO: 1 % (ref 0–2)
INTERPRETATION: ABNORMAL
LYMPHOCYTES # BLD AUTO: 1.97 THOUSANDS/ΜL (ref 0.6–4.47)
LYMPHOCYTES NFR BLD AUTO: 27 % (ref 14–44)
MCH RBC QN AUTO: 29.5 PG (ref 26.8–34.3)
MCHC RBC AUTO-ENTMCNC: 32.8 G/DL (ref 31.4–37.4)
MCV RBC AUTO: 90 FL (ref 82–98)
MONOCYTES # BLD AUTO: 0.88 THOUSAND/ΜL (ref 0.17–1.22)
MONOCYTES NFR BLD AUTO: 12 % (ref 4–12)
NEUTROPHILS # BLD AUTO: 3.88 THOUSANDS/ΜL (ref 1.85–7.62)
NEUTS SEG NFR BLD AUTO: 54 % (ref 43–75)
NRBC BLD AUTO-RTO: 0 /100 WBCS
PLATELET # BLD AUTO: 263 THOUSANDS/UL (ref 149–390)
PMV BLD AUTO: 10.6 FL (ref 8.9–12.7)
POTASSIUM SERPL-SCNC: 4.4 MMOL/L (ref 3.5–5.3)
PROT SERPL-MCNC: 7.2 G/DL (ref 6.4–8.2)
RBC # BLD AUTO: 4.14 MILLION/UL (ref 3.81–5.12)
SODIUM SERPL-SCNC: 137 MMOL/L (ref 136–145)
WBC # BLD AUTO: 7.18 THOUSAND/UL (ref 4.31–10.16)

## 2021-02-02 PROCEDURE — 88305 TISSUE EXAM BY PATHOLOGIST: CPT | Performed by: PATHOLOGY

## 2021-02-02 PROCEDURE — G1004 CDSM NDSC: HCPCS

## 2021-02-02 PROCEDURE — 45380 COLONOSCOPY AND BIOPSY: CPT | Performed by: INTERNAL MEDICINE

## 2021-02-02 PROCEDURE — 85025 COMPLETE CBC W/AUTO DIFF WBC: CPT | Performed by: INTERNAL MEDICINE

## 2021-02-02 PROCEDURE — 80048 BASIC METABOLIC PNL TOTAL CA: CPT | Performed by: INTERNAL MEDICINE

## 2021-02-02 PROCEDURE — 99232 SBSQ HOSP IP/OBS MODERATE 35: CPT | Performed by: INTERNAL MEDICINE

## 2021-02-02 PROCEDURE — 82378 CARCINOEMBRYONIC ANTIGEN: CPT | Performed by: INTERNAL MEDICINE

## 2021-02-02 PROCEDURE — 71260 CT THORAX DX C+: CPT

## 2021-02-02 PROCEDURE — 80076 HEPATIC FUNCTION PANEL: CPT | Performed by: INTERNAL MEDICINE

## 2021-02-02 RX ORDER — PROPOFOL 10 MG/ML
INJECTION, EMULSION INTRAVENOUS AS NEEDED
Status: DISCONTINUED | OUTPATIENT
Start: 2021-02-02 | End: 2021-02-02

## 2021-02-02 RX ORDER — SODIUM CHLORIDE, SODIUM LACTATE, POTASSIUM CHLORIDE, CALCIUM CHLORIDE 600; 310; 30; 20 MG/100ML; MG/100ML; MG/100ML; MG/100ML
INJECTION, SOLUTION INTRAVENOUS CONTINUOUS PRN
Status: DISCONTINUED | OUTPATIENT
Start: 2021-02-02 | End: 2021-02-02

## 2021-02-02 RX ORDER — SODIUM CHLORIDE, SODIUM LACTATE, POTASSIUM CHLORIDE, CALCIUM CHLORIDE 600; 310; 30; 20 MG/100ML; MG/100ML; MG/100ML; MG/100ML
125 INJECTION, SOLUTION INTRAVENOUS CONTINUOUS
Status: DISCONTINUED | OUTPATIENT
Start: 2021-02-02 | End: 2021-02-03

## 2021-02-02 RX ORDER — LIDOCAINE HYDROCHLORIDE 10 MG/ML
INJECTION, SOLUTION EPIDURAL; INFILTRATION; INTRACAUDAL; PERINEURAL AS NEEDED
Status: DISCONTINUED | OUTPATIENT
Start: 2021-02-02 | End: 2021-02-02

## 2021-02-02 RX ADMIN — IOHEXOL 85 ML: 350 INJECTION, SOLUTION INTRAVENOUS at 14:30

## 2021-02-02 RX ADMIN — SODIUM CHLORIDE, SODIUM LACTATE, POTASSIUM CHLORIDE, AND CALCIUM CHLORIDE: .6; .31; .03; .02 INJECTION, SOLUTION INTRAVENOUS at 10:25

## 2021-02-02 RX ADMIN — SODIUM CHLORIDE, SODIUM LACTATE, POTASSIUM CHLORIDE, AND CALCIUM CHLORIDE 125 ML/HR: .6; .31; .03; .02 INJECTION, SOLUTION INTRAVENOUS at 12:01

## 2021-02-02 RX ADMIN — PROPOFOL 20 MG: 10 INJECTION, EMULSION INTRAVENOUS at 10:46

## 2021-02-02 RX ADMIN — PROPOFOL 20 MG: 10 INJECTION, EMULSION INTRAVENOUS at 10:39

## 2021-02-02 RX ADMIN — PROPOFOL 20 MG: 10 INJECTION, EMULSION INTRAVENOUS at 10:42

## 2021-02-02 RX ADMIN — PROPOFOL 20 MG: 10 INJECTION, EMULSION INTRAVENOUS at 10:36

## 2021-02-02 RX ADMIN — LIDOCAINE HYDROCHLORIDE 50 MG: 10 INJECTION, SOLUTION EPIDURAL; INFILTRATION; INTRACAUDAL; PERINEURAL at 10:32

## 2021-02-02 RX ADMIN — PROPOFOL 40 MG: 10 INJECTION, EMULSION INTRAVENOUS at 10:32

## 2021-02-02 RX ADMIN — HEPARIN SODIUM 5000 UNITS: 5000 INJECTION INTRAVENOUS; SUBCUTANEOUS at 21:01

## 2021-02-02 RX ADMIN — SODIUM CHLORIDE, SODIUM LACTATE, POTASSIUM CHLORIDE, AND CALCIUM CHLORIDE 125 ML/HR: .6; .31; .03; .02 INJECTION, SOLUTION INTRAVENOUS at 20:22

## 2021-02-02 NOTE — ANESTHESIA POSTPROCEDURE EVALUATION
Post-Op Assessment Note    CV Status:  Stable    Pain management: adequate     Mental Status:  Sleepy and arousable   Hydration Status:  Euvolemic   PONV Controlled:  Controlled   Airway Patency:  Patent      Post Op Vitals Reviewed: Yes      Staff: CRNA         No complications documented      BP 97/51 (02/02/21 1054)    Temp 97 5 °F (36 4 °C) (02/02/21 1054)    Pulse 57 (02/02/21 1054)   Resp (!) 23 (02/02/21 1054)    SpO2 99 % (02/02/21 1054)

## 2021-02-02 NOTE — PLAN OF CARE
Problem: Potential for Falls  Goal: Patient will remain free of falls  Description: INTERVENTIONS:  - Assess patient frequently for physical needs  -  Identify cognitive and physical deficits and behaviors that affect risk of falls    -  [place] fall precautions as indicated by assessment   - Educate patient/family on patient safety including physical limitations  - Instruct patient to call for assistance with activity based on assessment  - Modify environment to reduce risk of injury  - Consider OT/PT consult to assist with strengthening/mobility  Outcome: Progressing     Problem: PAIN - ADULT  Goal: Verbalizes/displays adequate comfort level or baseline comfort level  Description: Interventions:  - Encourage patient to monitor pain and request assistance  - Assess pain using appropriate pain scale  - Administer analgesics based on type and severity of pain and evaluate response  - Implement non-pharmacological measures as appropriate and evaluate response  - Consider cultural and social influences on pain and pain management  - Notify physician/advanced practitioner if interventions unsuccessful or patient reports new pain  Outcome: Progressing     Problem: INFECTION - ADULT  Goal: Absence or prevention of progression during hospitalization  Description: INTERVENTIONS:  - Assess and monitor for signs and symptoms of infection  - Monitor lab/diagnostic results  - Monitor all insertion sites, i e  indwelling lines, tubes, and drains  - Monitor endotracheal if appropriate and nasal secretions for changes in amount and color  - [place] appropriate cooling/warming therapies per order  - Administer medications as ordered  - Instruct and encourage patient and family to use good hand hygiene technique  - Identify and instruct in appropriate isolation precautions for identified infection/condition  Outcome: Progressing  Goal: Absence of fever/infection during neutropenic period  Description: INTERVENTIONS:  - Monitor WBC    Outcome: Progressing     Problem: SAFETY ADULT  Goal: Patient will remain free of falls  Description: INTERVENTIONS:  - Assess patient frequently for physical needs  -  Identify cognitive and physical deficits and behaviors that affect risk of falls    -  [place] fall precautions as indicated by assessment   - Educate patient/family on patient safety including physical limitations  - Instruct patient to call for assistance with activity based on assessment  - Modify environment to reduce risk of injury  - Consider OT/PT consult to assist with strengthening/mobility  Outcome: Progressing  Goal: Maintain or return to baseline ADL function  Description: INTERVENTIONS:  -  Assess patient's ability to carry out ADLs; assess patient's baseline for ADL function and identify physical deficits which impact ability to perform ADLs (bathing, care of mouth/teeth, toileting, grooming, dressing, etc )  - Assess/evaluate cause of self-care deficits   - Assess range of motion  - Assess patient's mobility; develop plan if impaired  - Assess patient's need for assistive devices and provide as appropriate  - Encourage maximum independence but intervene and supervise when necessary  - Involve family in performance of ADLs  - Assess for home care needs following discharge   - Consider OT consult to assist with ADL evaluation and planning for discharge  - Provide patient education as appropriate  Outcome: Progressing  Goal: Maintain or return mobility status to optimal level  Description: INTERVENTIONS:  - Assess patient's baseline mobility status (ambulation, transfers, stairs, etc )    - Identify cognitive and physical deficits and behaviors that affect mobility  - Identify mobility aids required to assist with transfers and/or ambulation (gait belt, sit-to-stand, lift, walker, cane, etc )  - [place] fall precautions as indicated by assessment  - Record patient progress and toleration of activity level on Mobility SBAR; progress patient to next Phase/Stage  - Instruct patient to call for assistance with activity based on assessment  - Consider rehabilitation consult to assist with strengthening/weightbearing, etc   Outcome: Progressing     Problem: DISCHARGE PLANNING  Goal: Discharge to home or other facility with appropriate resources  Description: INTERVENTIONS:  - Identify barriers to discharge w/patient and caregiver  - Arrange for needed discharge resources and transportation as appropriate  - Identify discharge learning needs (meds, wound care, etc )  - Arrange for interpretive services to assist at discharge as needed  - Refer to Case Management Department for coordinating discharge planning if the patient needs post-hospital services based on physician/advanced practitioner order or complex needs related to functional status, cognitive ability, or social support system  Outcome: Progressing     Problem: Knowledge Deficit  Goal: Patient/family/caregiver demonstrates understanding of disease process, treatment plan, medications, and discharge instructions  Description: Complete learning assessment and assess knowledge base    Interventions:  - Provide teaching at level of understanding  - Provide teaching via preferred learning methods  Outcome: Progressing

## 2021-02-02 NOTE — CASE MANAGEMENT
Per [facility] is willing to accept patient  Cm TT SLIM for update and reported that [facility] accepted  SLIM reported that he will know more tomorrow  Patient needs to be seen by surgery and have staging scans today  Cm department will continue to follow patient through discharge

## 2021-02-02 NOTE — UTILIZATION REVIEW
Continued Stay Review    Date: 2/2/21                          Current Patient Class: inpatient  Current Level of Care: med surg    HPI:89 y o  female w/hx osteoporosis, sciatica initially admitted on 1/28/21 as inpatient due to acalculous cholecystitis  Surgery and GI consulted, IV antbx, IVF, imaging were in progress  Assessment/Plan:   1/30 per GI: severe fatigue and weakness with abnormal LFT"s, suspecting tylenol toxicity  AST/ALT improved after NAC  MRCP showed no choledocholithiasis  Has had no gi sx and no signs of cholangitis  Imaging shows colonic mass  1/31: still with extreme fatigue  Tolerating diet  Back hurts  HIDA ordered to r/o acute cholecystitis and was normal   Possible colonoscopy to be done this week  2/1: fair appetite, strength poor and declining  Loose bm today  Having difficulty catching breath  C/o low abd discomfort  Lungs w/poor effort, decreased basilar sounds, faint L crackles  abd diffusely tender  Surgery has signed off due to no surgical findings  GI notes ascending colon lesion is suspicious for colon ca  To have colonoscopy tomorrow  2/2: feeling better, did not tolerate bowel prep due to nausea  abd continues diffusely tender and mildly distended  Colonoscopy showed large mass covering 1/3 of ascending colon  Other findings below  CT chest has been ordered  Pertinent Labs/Diagnostic Results:   1/29 MRCP: Gallbladder has an abnormal appearance with wall thickening and/or pericholecystic fluid  No stones identified  Possibility is raised of acalculous cholecystitis    CBD is normal in diameter    There is mild nonspecific enhancement of the distal CBD which may represent mild cholangitis    Upper abdominal portacaval lymphadenopathy measuring 1 2 cm    Enhancing focus in the ascending colon wall reidentified suspicious for neoplasm measuring approximately 3 5 cm on image 14/323     1/31 HIDA: normal  2/1 PCXR: bibasilar atelectasis    2/2 colonoscopy: IMPRESSION:  1  Small external hemorrhoids, nonbleeding  2  Pancolonic diverticulosis coli, severe  3  Three polyps of the cecum, not biopsied or removed  4  Large mass of the proximal ascending colon covering 1/3 of the circumference of the ascending, biopsied      RECOMMENDATION:  1  Surgical consultation  2  CEA level  3  CT scan of the chest with IV contrast  4   Resume previous diet          Results from last 7 days   Lab Units 02/02/21  0532 02/01/21  0513 01/31/21  0444 01/30/21  0502 01/29/21  0451   WBC Thousand/uL 7 18 7 04 4 92 5 82 6 12   HEMOGLOBIN g/dL 12 2 11 5 11 3* 11 2* 11 9   HEMATOCRIT % 37 2 34 3* 34 1* 34 8 36 4   PLATELETS Thousands/uL 263 229 217 210 222   NEUTROS ABS Thousands/µL 3 88 4 01 2 44 2 93  --          Results from last 7 days   Lab Units 02/02/21  0532 02/01/21  0513 01/31/21  0444 01/30/21  0502 01/29/21  0451   SODIUM mmol/L 137 136 137 138 138   POTASSIUM mmol/L 4 4 3 7 3 8 3 4* 3 7   CHLORIDE mmol/L 106 108 109* 106 108   CO2 mmol/L 28 22 22 23 23   ANION GAP mmol/L 3* 6 6 9 7   BUN mg/dL 7 8 11 14 19   CREATININE mg/dL 0 70 0 79 0 89 1 00 0 86   EGFR ml/min/1 73sq m 76 66 57 49 59   CALCIUM mg/dL 8 4 7 7* 8 0* 7 9* 8 2*   MAGNESIUM mg/dL  --   --   --   --  1 9     Results from last 7 days   Lab Units 02/02/21  0532 02/01/21  0513 01/31/21 0444 01/30/21  0502 01/29/21  0451 01/28/21  1759   AST U/L 662* 649*  648* 688* 777* 1,185* 1,504*   ALT U/L 663* 696*  696* 756* 868* 1,122* 1,448*   ALK PHOS U/L 356* 353*  349* 321* 297* 338* 404*   TOTAL PROTEIN g/dL 7 2 6 7  6 4 6 2* 6 4 7 0 8 4*   ALBUMIN g/dL 2 0* 1 8*  1 7* 1 8* 1 8* 2 2* 2 7*   TOTAL BILIRUBIN mg/dL 3 90* 3 00*  3 00* 2 00* 1 50* 1 70* 2 00*   BILIRUBIN DIRECT mg/dL 3 15* 2 35*  --   --   --  1 28*         Results from last 7 days   Lab Units 02/02/21  0532 02/01/21  0513 01/31/21  0444 01/30/21  0502 01/29/21  0451 01/28/21  1759   GLUCOSE RANDOM mg/dL 83 96 92 124 98 134         Results from last 7 days Lab Units 01/28/21  0933   HEMOGLOBIN A1C % 5 7*   EAG mg/dl 117     Results from last 7 days   Lab Units 01/28/21  1759   TROPONIN I ng/mL <0 02         Results from last 7 days   Lab Units 02/01/21  0513 01/30/21  0502 01/29/21  0451 01/28/21  1759   PROTIME seconds 14 9* 15 9* 14 4 13 9   INR  1 15 1 34* 1 18 1 13   PTT seconds  --   --   --  29     Results from last 7 days   Lab Units 01/28/21  0933   TSH 3RD GENERATON uIU/mL 2 680     Results from last 7 days   Lab Units 01/30/21  0502 01/28/21  2205   PROCALCITONIN ng/ml 0 48* 0 51*     Results from last 7 days   Lab Units 01/30/21  0502   HEP B S AG  Non-reactive   HEP C AB  Non-reactive   HEP B C IGM  Non-reactive     Results from last 7 days   Lab Units 01/28/21  1759   LIPASE u/L 204     Results from last 7 days   Lab Units 01/28/21  1759   CRP mg/L 31 4*         Results from last 7 days   Lab Units 01/28/21  1846   CLARITY UA  Clear   COLOR UA  Yellow   SPEC GRAV UA  1 015   PH UA  5 5   GLUCOSE UA mg/dl Negative   KETONES UA mg/dl Negative   BLOOD UA  Negative   PROTEIN UA mg/dl Trace*   NITRITE UA  Positive*   BILIRUBIN UA  Small*   UROBILINOGEN UA E U /dl 1 0   LEUKOCYTES UA  Negative   WBC UA /hpf 2-4   RBC UA /hpf None Seen   BACTERIA UA /hpf Occasional   EPITHELIAL CELLS WET PREP /hpf Occasional         Vital Signs:   Date/Time  Temp  Pulse  Resp  BP  MAP (mmHg)  SpO2  O2 Flow Rate (L/min)  O2 Device    02/02/21 1114  --  61  22  106/50  --  96 %  --  None (Room air)    02/02/21 1104  --  62  20  93/55  --  94 %  --  None (Room air)    02/02/21 1054  97 5 °F (36 4 °C)  57  23Abnormal   97/51  --  99 %  6 L/min  Simple mask    02/02/21 0931  98 8 °F (37 1 °C)  62  18  144/86  --  98 %  --  None (Room air)    02/02/21 0659  98 3 °F (36 8 °C)  61  18  129/60  --  95 %  --  --    02/02/21 05:30:06  98 °F (36 7 °C)  --  --  --  --  --  --  --    02/01/21 2245  98 9 °F (37 2 °C)  67  18  140/65  94  96 %  --  None (Room air)    02/01/21 15:19:46  97 8 °F (36 6 °C)  69  18  150/67  97  95 %  --  None (Room air)    02/01/21 0658  98 5 °F (36 9 °C)  71  18  126/65  --  92 %  --  --    01/31/21 23:25:26  99 °F (37 2 °C)  63  18  110/55  78  91 %  --  None (Room air)          Medications:   Scheduled Medications:  Brinzolamide-Brimonidine, 1 drop, Both Eyes, BID  heparin (porcine), 5,000 Units, Subcutaneous, Q12H [address]  Latanoprostene Bunod, 1 drop, Both Eyes, HS  Netarsudil Dimesylate, 1 drop, Both Eyes, HS  piperacillin-tazobactam (ZOSYN) 3 375 g in sodium chloride 0 9 % 100 mL IVPB   Dose: 3 375 g  Freq: Every 8 hours Route: IV  Last Dose: 3 375 g (02/01/21 1018)  Start: 01/29/21 0000 End: 02/01/21 1326    PO kdur x 1 on 1/30    Continuous IV Infusions:  lactated ringers, 125 mL/hr, Intravenous, Continuous    sodium chloride 0 9 % infusion   Rate: 100 mL/hr Dose: 100 mL/hr  Freq: Continuous Route: IV  Last Dose: 100 mL/hr (01/31/21 1925)  Start: 01/28/21 2230 End: 02/01/21 0747    PRN Meds:  ondansetron, 4 mg, Intravenous, Q6H PRN  IV morphine x 1 on 1/31      Discharge Plan: D    Network Utilization Review Department  ATTENTION: Please call with any questions or concerns to [phone] and carefully listen to the prompts so that you are directed to the right person  All voicemails are confidential   Kina Ingamanda all requests for admission clinical reviews, approved or denied determinations and any other requests to dedicated fax number below belonging to the campus where the patient is receiving treatment   List of dedicated fax numbers for the Facilities:  [address] DENIALS (Administrative/Medical Necessity) [phone]   [address] [place] [address] (Maternity/NICU/Pediatrics) 261 [facility],7Th Floor [facility] [address] [place] [address] Dr Monica Morales [address] [place] [address] [place] [address] Eulogio Fernandez [facility] [phone]   [facility] [phone]

## 2021-02-02 NOTE — PROGRESS NOTES
Progress Note - Mark Nelson Cuani 80 y o  female MRN: [id number]  Unit/Bed#: -01 Encounter: [id number]    Subjective:   Luma Alves is feeling better this morning  She did not tolerate the bowel prep well and did not completed  Per nursing she is not clear  She noted nausea with the prep but did not vomit  Her breathing has improved  She denies any fevers, chills, chest pain or palpitations  All other ROS are negative  Objective:   Vitals: Blood pressure 129/60, pulse 61, temperature 98 3 °F (36 8 °C), resp  rate 18, height 4' 11" (1 499 m), weight 56 2 kg (123 lb 14 4 oz), SpO2 95 %  ,Body mass index is 25 02 kg/m²  SPO2 RA Rest      ED to Hosp-Admission (Current) from 1/28/2021 in [facility] 4th Floor Med Surg Unit   SpO2  95 %   SpO2 Activity  At Rest   O2 Device  None (Room air)   O2 Flow Rate  --        I&O:     Intake/Output Summary (Last 24 hours) at 2/2/2021 0745  Last data filed at 2/1/2021 2021  Gross per 24 hour   Intake 240 ml   Output --   Net 240 ml         Physical Exam:       General Appearance:    Alert, cooperative, no distress   Head:    Normocephalic, without obvious abnormality, atraumatic   Eyes:    PERRL, conjunctiva/corneas clear, EOM's intact       Nose:   Moist mucous membranes, no drainage or sinus tenderness   Throat:   No tenderness, no exudates   Neck:   Supple, symmetrical, trachea midline, no JVD   Lungs:     Clear to auscultation bilaterally, diminished breath sounds at bases, respirations unlabored   Heart:    Regular rate and rhythm, S1 and S2 normal, no murmur, rub   or gallop   Abdomen: Soft, mildly distended, diffusely tender, positive bowel sounds, no masses, no organomegaly   Extremities:  No pedal edema, calf tenderness  Distal pulses palpable  Neurologic:     CNII-XII intact        Invasive Devices     Peripheral Intravenous Line            Peripheral IV 02/01/21 Left Antecubital less than 1 day                      Social History  reviewed  Family History   Problem Relation Age of Onset    Diabetes type II Mother     Pancreatic cancer Father     Colon cancer Sister     reviewed    Meds:  Current Facility-Administered Medications   Medication Dose Route Frequency Provider Last Rate Last Admin    Brinzolamide-Brimonidine 1-0 2 % SUSP 1 drop  1 drop Both Eyes BID George Guzman MD   1 drop at 02/01/21 2156    heparin (porcine) subcutaneous injection 5,000 Units  5,000 Units Subcutaneous Q12H [address] George Guzman MD   5,000 Units at 02/01/21 2158    Latanoprostene Bunod 0 024 % SOLN 1 drop  1 drop Both Eyes HS George Guzman MD   1 drop at 02/01/21 2200    Netarsudil Dimesylate 0 02 % SOLN 1 drop  1 drop Both Eyes HS George Guzman MD   1 drop at 02/01/21 2155    ondansetron (ZOFRAN) injection 4 mg  4 mg Intravenous Q6H PRN George Guzman MD          Medications Prior to Admission   Medication    acetaminophen (TYLENOL) 500 mg tablet    atorvastatin (LIPITOR) 20 mg tablet    cetirizine (ZyrTEC) 10 mg tablet    Cholecalciferol ([employer] VITAMIN D) 2000 units CAPS    mirtazapine (REMERON) 7 5 MG tablet    Multiple Vitamins-Minerals (CENTRUM SILVER 50+WOMEN) TABS    RHOPRESSA 0 02 % SOLN    SIMBRINZA 1-0 2 % SUSP    VYZULTA 0 024 % SOLN       Labs:  Results from last 7 days   Lab Units 02/02/21  0532 02/01/21  0513 01/31/21  0444   WBC Thousand/uL 7 18 7 04 4 92   HEMOGLOBIN g/dL 12 2 11 5 11 3*   HEMATOCRIT % 37 2 34 3* 34 1*   PLATELETS Thousands/uL 263 229 217   NEUTROS PCT % 54 56 49   LYMPHS PCT % 27 24 30   MONOS PCT % 12 12 13*   EOS PCT % 5 6 6     Results from last 7 days   Lab Units 02/02/21  0532 02/01/21  0513 01/31/21  0444 01/30/21  0502   POTASSIUM mmol/L 4 4 3 7 3 8 3 4*   CHLORIDE mmol/L 106 108 109* 106   CO2 mmol/L 28 22 22 23   BUN mg/dL 7 8 11 14   CREATININE mg/dL 0 70 0 79 0 89 1 00   CALCIUM mg/dL 8 4 7 7* 8 0* 7 9*   ALK PHOS U/L  --  353*  349* 321* 297*   ALT U/L  --  696*  696* 756* 868*   AST U/L  --  649*  648* 688* 777*     Lab Results Component Value Date    TROPONINI <0 02 01/28/2021     Results from last 7 days   Lab Units 02/01/21  0513 01/30/21  0502 01/29/21  0451   INR  1 15 1 34* 1 18     Lab Results   Component Value Date    URINECX <10,000 cfu/ml  10/28/2019       Imaging:  Results for orders placed during the hospital encounter of 01/28/21   XR chest portable    Narrative CHEST     INDICATION:   Dyspnea  COMPARISON:  CT abdomen pelvis 1/28/2021    EXAM PERFORMED/VIEWS:  XR CHEST PORTABLE      FINDINGS:    Cardiomediastinal silhouette appears unremarkable  Mild bibasilar atelectasis  Left hemidiaphragm is mildly elevated  No pleural effusion or pneumothorax  Osseous structures appear within normal limits for patient age  Impression Mild bibasilar atelectasis  Workstation performed: [id number]       No results found for this or any previous visit  VTE Pharmacologic Prophylaxis: Heparin      Code Status:   Level 3 - DNAR and DNI    Assessment:  Principal Problem:    Acalculous cholecystitis  Active Problems:    Esophageal reflux    Hyperlipidemia    Macular degeneration    Colonic mass    Abnormal finding on urinalysis    Elevated blood pressure reading    Anxiety    Transaminitis    Shortness of breath  Resolved Problems:    * No resolved hospital problems  *      Plan:  Acute hepatitis presumably secondary to chronic Tylenol overdose status post course of NAC  Transaminases trending down, alk-phos and bilirubin were rising  LFTs pending this morning  Continue to follow  Ascending colon mass with surrounding adenopathy concerning for malignancy-colonoscopy today  Patient encouraged to complete prep this morning  Discussed with nursing at bedside  Dyspnea-improved  X-ray reveals atelectasis  Incentive spirometry encouraged  Anxiety and insomnia-continue mirtazapine  Hyperlipidemia-statin on hold due to hepatitis      Disposition-I have reconsulted Physical therapy and Occupational therapy based upon generalized weakness over the last several days  Patient may be stable for discharge in 24-48 hours  She may need subacute rehab      Tramaine Johnson MD  6/6/4977,2:71 AM

## 2021-02-02 NOTE — ANESTHESIA PREPROCEDURE EVALUATION
Procedure:  COLONOSCOPY    Relevant Problems   CARDIO   (+) Hyperlipidemia   (+) Hypertension      GI/HEPATIC   (+) Esophageal reflux      NEURO/PSYCH   (+) Anxiety      PULMONARY   (+) Shortness of breath        Physical Exam    Airway    Mallampati score: III  TM Distance: >3 FB  Neck ROM: full     Dental   upper dentures and lower dentures,     Cardiovascular  Rhythm: regular, Rate: normal,     Pulmonary  Rhonchi,     Other Findings        Anesthesia Plan  ASA Score- 2     Anesthesia Type- IV sedation with anesthesia with ASA Monitors  Additional Monitors:   Airway Plan:           Plan Factors-Exercise tolerance (METS): <4 METS  Chart reviewed  Existing labs reviewed  Induction- intravenous  Postoperative Plan-     Informed Consent- Anesthetic plan and risks discussed with patient  I personally reviewed this patient with the CRNA  Discussed and agreed on the Anesthesia Plan with the CRNA  Ev Sanchez

## 2021-02-03 ENCOUNTER — TELEPHONE (OUTPATIENT)
Dept: INTERNAL MEDICINE CLINIC | Facility: CLINIC | Age: 86
End: 2021-02-03

## 2021-02-03 PROBLEM — G44.83 PRIMARY COUGH HEADACHE: Status: ACTIVE | Noted: 2021-02-03

## 2021-02-03 PROBLEM — R05.9 COUGH: Status: ACTIVE | Noted: 2021-02-03

## 2021-02-03 LAB
ALBUMIN SERPL BCP-MCNC: 1.7 G/DL (ref 3.5–5)
ALP SERPL-CCNC: 371 U/L (ref 46–116)
ALT SERPL W P-5'-P-CCNC: 567 U/L (ref 12–78)
ANA HOMOGEN SER QL IF: NORMAL
ANA HOMOGEN TITR SER: NORMAL {TITER}
ANION GAP SERPL CALCULATED.3IONS-SCNC: 1 MMOL/L (ref 4–13)
AST SERPL W P-5'-P-CCNC: 598 U/L (ref 5–45)
BASOPHILS # BLD AUTO: 0.07 THOUSANDS/ΜL (ref 0–0.1)
BASOPHILS NFR BLD AUTO: 1 % (ref 0–1)
BILIRUB DIRECT SERPL-MCNC: 2.49 MG/DL (ref 0–0.2)
BILIRUB SERPL-MCNC: 3 MG/DL (ref 0.2–1)
BUN SERPL-MCNC: 9 MG/DL (ref 5–25)
CALCIUM SERPL-MCNC: 7.9 MG/DL (ref 8.3–10.1)
CHLORIDE SERPL-SCNC: 106 MMOL/L (ref 100–108)
CO2 SERPL-SCNC: 28 MMOL/L (ref 21–32)
CREAT SERPL-MCNC: 0.77 MG/DL (ref 0.6–1.3)
EOSINOPHIL # BLD AUTO: 0.36 THOUSAND/ΜL (ref 0–0.61)
EOSINOPHIL NFR BLD AUTO: 6 % (ref 0–6)
ERYTHROCYTE [DISTWIDTH] IN BLOOD BY AUTOMATED COUNT: 16.5 % (ref 11.6–15.1)
GFR SERPL CREATININE-BSD FRML MDRD: 68 ML/MIN/1.73SQ M
GLUCOSE SERPL-MCNC: 108 MG/DL (ref 65–140)
HCT VFR BLD AUTO: 33.3 % (ref 34.8–46.1)
HGB BLD-MCNC: 11.2 G/DL (ref 11.5–15.4)
IMM GRANULOCYTES # BLD AUTO: 0.04 THOUSAND/UL (ref 0–0.2)
IMM GRANULOCYTES NFR BLD AUTO: 1 % (ref 0–2)
LYMPHOCYTES # BLD AUTO: 1.72 THOUSANDS/ΜL (ref 0.6–4.47)
LYMPHOCYTES NFR BLD AUTO: 27 % (ref 14–44)
MCH RBC QN AUTO: 29.9 PG (ref 26.8–34.3)
MCHC RBC AUTO-ENTMCNC: 33.6 G/DL (ref 31.4–37.4)
MCV RBC AUTO: 89 FL (ref 82–98)
MONOCYTES # BLD AUTO: 0.89 THOUSAND/ΜL (ref 0.17–1.22)
MONOCYTES NFR BLD AUTO: 14 % (ref 4–12)
NEUTROPHILS # BLD AUTO: 3.33 THOUSANDS/ΜL (ref 1.85–7.62)
NEUTS SEG NFR BLD AUTO: 51 % (ref 43–75)
NRBC BLD AUTO-RTO: 0 /100 WBCS
PLATELET # BLD AUTO: 274 THOUSANDS/UL (ref 149–390)
PMV BLD AUTO: 10.3 FL (ref 8.9–12.7)
POTASSIUM SERPL-SCNC: 3.5 MMOL/L (ref 3.5–5.3)
PROT SERPL-MCNC: 6.7 G/DL (ref 6.4–8.2)
RBC # BLD AUTO: 3.75 MILLION/UL (ref 3.81–5.12)
RYE IGE QN: POSITIVE
SODIUM SERPL-SCNC: 135 MMOL/L (ref 136–145)
WBC # BLD AUTO: 6.41 THOUSAND/UL (ref 4.31–10.16)

## 2021-02-03 PROCEDURE — 94664 DEMO&/EVAL PT USE INHALER: CPT

## 2021-02-03 PROCEDURE — NC001 PR NO CHARGE: Performed by: PHYSICIAN ASSISTANT

## 2021-02-03 PROCEDURE — 99222 1ST HOSP IP/OBS MODERATE 55: CPT | Performed by: INTERNAL MEDICINE

## 2021-02-03 PROCEDURE — 85025 COMPLETE CBC W/AUTO DIFF WBC: CPT | Performed by: INTERNAL MEDICINE

## 2021-02-03 PROCEDURE — 99223 1ST HOSP IP/OBS HIGH 75: CPT | Performed by: SURGERY

## 2021-02-03 PROCEDURE — 99232 SBSQ HOSP IP/OBS MODERATE 35: CPT | Performed by: INTERNAL MEDICINE

## 2021-02-03 PROCEDURE — 80076 HEPATIC FUNCTION PANEL: CPT | Performed by: INTERNAL MEDICINE

## 2021-02-03 PROCEDURE — 80048 BASIC METABOLIC PNL TOTAL CA: CPT | Performed by: INTERNAL MEDICINE

## 2021-02-03 PROCEDURE — 94760 N-INVAS EAR/PLS OXIMETRY 1: CPT

## 2021-02-03 PROCEDURE — 97116 GAIT TRAINING THERAPY: CPT

## 2021-02-03 PROCEDURE — 94640 AIRWAY INHALATION TREATMENT: CPT

## 2021-02-03 PROCEDURE — 97110 THERAPEUTIC EXERCISES: CPT

## 2021-02-03 RX ORDER — ALBUTEROL SULFATE 2.5 MG/3ML
2.5 SOLUTION RESPIRATORY (INHALATION)
Status: DISCONTINUED | OUTPATIENT
Start: 2021-02-03 | End: 2021-02-03

## 2021-02-03 RX ORDER — MIRTAZAPINE 15 MG/1
15 TABLET, FILM COATED ORAL
Status: DISCONTINUED | OUTPATIENT
Start: 2021-02-03 | End: 2021-02-09 | Stop reason: HOSPADM

## 2021-02-03 RX ORDER — PANTOPRAZOLE SODIUM 40 MG/1
40 TABLET, DELAYED RELEASE ORAL
Status: DISCONTINUED | OUTPATIENT
Start: 2021-02-03 | End: 2021-02-09 | Stop reason: HOSPADM

## 2021-02-03 RX ORDER — IBUPROFEN 400 MG/1
400 TABLET ORAL EVERY 6 HOURS PRN
Status: DISCONTINUED | OUTPATIENT
Start: 2021-02-03 | End: 2021-02-09 | Stop reason: HOSPADM

## 2021-02-03 RX ADMIN — PANTOPRAZOLE SODIUM 40 MG: 40 TABLET, DELAYED RELEASE ORAL at 09:54

## 2021-02-03 RX ADMIN — ALBUTEROL SULFATE 2.5 MG: 2.5 SOLUTION RESPIRATORY (INHALATION) at 08:56

## 2021-02-03 RX ADMIN — MIRTAZAPINE 15 MG: 15 TABLET, FILM COATED ORAL at 22:11

## 2021-02-03 RX ADMIN — HEPARIN SODIUM 5000 UNITS: 5000 INJECTION INTRAVENOUS; SUBCUTANEOUS at 09:55

## 2021-02-03 RX ADMIN — HEPARIN SODIUM 5000 UNITS: 5000 INJECTION INTRAVENOUS; SUBCUTANEOUS at 21:51

## 2021-02-03 NOTE — RESPIRATORY THERAPY NOTE
RT Protocol Note  Zeyad Sesay Cuda 80 y o  female MRN: [id number]  Unit/Bed#: -01 Encounter: [id number]    Assessment    Principal Problem:    Acalculous cholecystitis  Active Problems:    Esophageal reflux    Hyperlipidemia    Macular degeneration    Colonic mass    Abnormal finding on urinalysis    Elevated blood pressure reading    Anxiety    Transaminitis    Shortness of breath      Home Pulmonary Medications:  none       Past Medical History:   Diagnosis Date    Hyperlipidemia     Osteoporosis     Sciatica      Social History     Socioeconomic History    Marital status:       Spouse name: None    Number of children: None    Years of education: None    Highest education level: None   Occupational History    Occupation: Retired    Social Needs    Financial resource strain: None    Food insecurity     Worry: None     Inability: None    Transportation needs     Medical: None     Non-medical: None   Tobacco Use    Smoking status: Never Smoker    Smokeless tobacco: Never Used   Substance and Sexual Activity    Alcohol use: Not Currently    Drug use: No    Sexual activity: Not Currently     Partners: Male   Lifestyle    Physical activity     Days per week: 0 days     Minutes per session: 0 min    Stress: Not at all   Relationships    Social connections     Talks on phone: None     Gets together: None     Attends Yazidi service: None     Active member of club or organization: None     Attends meetings of clubs or organizations: None     Relationship status: None    Intimate partner violence     Fear of current or ex partner: None     Emotionally abused: None     Physically abused: None     Forced sexual activity: None   Other Topics Concern    None   Social History Narrative    Active advance directive        Subjective         Objective    Physical Exam:   Assessment Type: Assess only  General Appearance: Alert, Awake  Respiratory Pattern: Normal  Chest Assessment: Chest expansion symmetrical  Bilateral Breath Sounds: Clear    Vitals:  Blood pressure 123/58, pulse 67, temperature 97 7 °F (36 5 °C), resp  rate 18, height 4' 11" (1 499 m), weight 56 2 kg (123 lb 14 4 oz), SpO2 95 %, not currently breastfeeding  Imaging and other studies: I have personally reviewed pertinent reports  Plan    Respiratory Plan: No distress/Pulmonary history        Resp Comments: pt awake and alert  no distress  tx given  Pt admitted for ab px  Has no respiratory history,  Breath sounds are clear  Pt complains of cough  Neb tx given that md ordered  No change in breath sounds  Nebs will be discontinued at this time  Pt has a strong, productive cough and can clear and protect her airway

## 2021-02-03 NOTE — PLAN OF CARE
Problem: PHYSICAL THERAPY ADULT  Goal: Performs mobility at highest level of function for planned discharge setting  See evaluation for individualized goals  Description: Treatment/Interventions: Functional transfer training, LE strengthening/ROM, Therapeutic exercise, Endurance training, Patient/family training, Equipment eval/education, Bed mobility, Gait training, Spoke to nursing, OT          See flowsheet documentation for full assessment, interventions and recommendations  Outcome: Progressing  Note: Prognosis: Good  Problem List: Decreased strength, Decreased endurance, Impaired balance, Decreased mobility, Impaired vision  Assessment: Pt seen for PT treatment session this date with interventions consisting of gait training w/ emphasis on improving pt's ability to ambulate level surfaces x 40' x2 with min A provided by therapist with RW and Therapeutic exercise consisting of: AROM 10 reps B LE in sitting position  Pt agreeable to PT treatment session upon arrival, pt found supine in bed w/ HOB elevated, in no apparent distress and responsive  In comparison to previous session, pt with improvements in household distance gait tolerance & initiation of B LE exercises; pt denies any pain c exercises  pt requiring min A for transfers & gait training trial c RW  Post session: pt returned BTB, bed alarm engaged and all needs in reach  Recommend STR at time of d/c in order to maximize pt's functional independence and safety w/ mobility  Pt continues to be functioning below baseline level, and remains limited 2* factors listed above  PT will continue to see pt while here in order to address the deficits listed above and provide interventions consistent w/ POC in effort to achieve STGs    Barriers to Discharge: Decreased caregiver support  Barriers to Discharge Comments: patient reports her daughter will be staying with her at discharge  PT Discharge Recommendation: [address]     PT - OK to Discharge: No    See flowsheet documentation for full assessment

## 2021-02-03 NOTE — PROGRESS NOTES
Progress Note - Lucy Burgess Cuda 80 y o  female MRN: [id number]  Unit/Bed#: -01 Encounter: [id number]    Subjective:    Leland Burger is feeling better today  Her appetite and energy are good  ling better this morning  She does complain of nonproductive cough without chest pain or shortness of breath  She also has diffuse abdominal discomfort but no nausea or vomiting  She denies fevers or chills  She notes headache and attributed to not having caffeine last few days  All other ROS are negative  Objective:   Vitals: Blood pressure 123/58, pulse 67, temperature 97 7 °F (36 5 °C), resp  rate 18, height 4' 11" (1 499 m), weight 56 2 kg (123 lb 14 4 oz), SpO2 95 %, not currently breastfeeding  ,Body mass index is 25 02 kg/m²  SPO2 RA Rest      ED to Hosp-Admission (Current) from 1/28/2021 in [address] [place] [address] 4th Floor Med Surg Unit   SpO2  95 %   SpO2 Activity  At Rest   O2 Device  None (Room air)   O2 Flow Rate  6 L/min        I&O:     Intake/Output Summary (Last 24 hours) at 2/3/2021 7928  Last data filed at 2/3/2021 0700  Gross per 24 hour   Intake 1230 ml   Output 200 ml   Net 1030 ml         Physical Exam:       General Appearance:    Alert, cooperative, no distress   Head:    Normocephalic, without obvious abnormality, atraumatic   Eyes:    PERRL, conjunctiva/corneas clear, EOM's intact       Nose:   Moist mucous membranes, no drainage or sinus tenderness   Throat:   No tenderness, no exudates   Neck:   Supple, symmetrical, trachea midline, no JVD   Lungs:       Diminished breath sounds at bases bilaterally, scattered faint wheezes, respirations unlabored   Heart:    Regular rate and rhythm, S1 and S2 normal, no murmur, rub   or gallop   Abdomen: Soft, diffusely ttp, positive bowel sounds, no masses, no organomegaly   Extremities:  No pedal edema, calf tenderness  Distal pulses palpable  Neurologic:     CNII-XII intact        Invasive Devices     Peripheral Intravenous Line            Peripheral IV 02/01/21 Left Antecubital 1 day                      Social History  reviewed  Family History   Problem Relation Age of Onset    Diabetes type II Mother     Pancreatic cancer Father     Colon cancer Sister     reviewed    Meds:  Current Facility-Administered Medications   Medication Dose Route Frequency Provider Last Rate Last Admin    Brinzolamide-Brimonidine 1-0 2 % SUSP 1 drop  1 drop Both Eyes BID Era Yue, DO   1 drop at 02/02/21 2102    heparin (porcine) subcutaneous injection 5,000 Units  5,000 Units Subcutaneous Q12H [address], DO   5,000 Units at 02/02/21 2101    ibuprofen (MOTRIN) tablet 400 mg  400 mg Oral Q6H PRN Mark Bach MD        Latanoprostene Bunod 0 024 % SOLN 1 drop  1 drop Both Eyes HS Era [place], DO   1 drop at 02/02/21 2102    mirtazapine (REMERON) tablet 15 mg  15 mg Oral HS Mark Bach MD        Netarsudil Dimesylate 0 02 % SOLN 1 drop  1 drop Both Eyes HS Era Yue, DO   1 drop at 02/02/21 2102    ondansetron (ZOFRAN) injection 4 mg  4 mg Intravenous Q6H PRN Era [place], DO        pantoprazole (PROTONIX) EC tablet 40 mg  40 mg Oral Early Morning Mark Bach MD          Medications Prior to Admission   Medication    acetaminophen (TYLENOL) 500 mg tablet    atorvastatin (LIPITOR) 20 mg tablet    cetirizine (ZyrTEC) 10 mg tablet    Cholecalciferol ([employer] VITAMIN D) 2000 units CAPS    mirtazapine (REMERON) 7 5 MG tablet    Multiple Vitamins-Minerals (CENTRUM SILVER 50+WOMEN) TABS    RHOPRESSA 0 02 % SOLN    SIMBRINZA 1-0 2 % SUSP    VYZULTA 0 024 % SOLN       Labs:  Results from last 7 days   Lab Units 02/03/21  0450 02/02/21  0532 02/01/21  0513   WBC Thousand/uL 6 41 7 18 7 04   HEMOGLOBIN g/dL 11 2* 12 2 11 5   HEMATOCRIT % 33 3* 37 2 34 3*   PLATELETS Thousands/uL 274 263 229   NEUTROS PCT % 51 54 56   LYMPHS PCT % 27 27 24   MONOS PCT % 14* 12 12   EOS PCT % 6 5 6     Results from last 7 days   Lab Units 02/03/21  0450 02/02/21  0532 02/01/21  0513   POTASSIUM mmol/L 3 5 4 4 3 7   CHLORIDE mmol/L 106 106 108   CO2 mmol/L 28 28 22   BUN mg/dL 9 7 8   CREATININE mg/dL 0 77 0 70 0 79   CALCIUM mg/dL 7 9* 8 4 7 7*   ALK PHOS U/L 371* 356* 353*  349*   ALT U/L 567* 663* 696*  696*   AST U/L 598* 662* 649*  648*     Lab Results   Component Value Date    TROPONINI <0 02 01/28/2021     Results from last 7 days   Lab Units 02/01/21  0513 01/30/21  0502 01/29/21  0451   INR  1 15 1 34* 1 18     Lab Results   Component Value Date    URINECX <10,000 cfu/ml  10/28/2019       Imaging:  Results for orders placed during the hospital encounter of 01/28/21   XR chest portable    Narrative CHEST     INDICATION:   Dyspnea  COMPARISON:  CT abdomen pelvis 1/28/2021    EXAM PERFORMED/VIEWS:  XR CHEST PORTABLE      FINDINGS:    Cardiomediastinal silhouette appears unremarkable  Mild bibasilar atelectasis  Left hemidiaphragm is mildly elevated  No pleural effusion or pneumothorax  Osseous structures appear within normal limits for patient age  Impression Mild bibasilar atelectasis  Workstation performed: [id number]       No results found for this or any previous visit  VTE Pharmacologic Prophylaxis: Heparin      Code Status:   Level 3 - DNAR and DNI    Assessment:  Principal Problem:    Acalculous cholecystitis  Active Problems:    Esophageal reflux    Hyperlipidemia    Macular degeneration    Colonic mass    Abnormal finding on urinalysis    Elevated blood pressure reading    Anxiety    Transaminitis    Shortness of breath    Cough    Primary cough headache  Resolved Problems:    * No resolved hospital problems  *      Plan:   acute hepatitis presumed secondary to chronic Tylenol overdose status post course of N-acetylcysteine  Transaminases continue to trend downward, alk-phos continues to rise while bilirubin is also trending downward  Perhaps related to colon mass  Continue to follow transaminases        Colon mass presumed malignant -biopsy pending  CEA level mildly elevated  CT chest notable for two tiny pulmonary nodules  Await tissue pathology, surgical consultation  Will consult Medical Oncology if tissue positive  For malignancy  Blood pressure remained stable without medication    Hyperlipidemia-continue to hold statin    Headache -avoid acetaminophen, low-dose ibuprofen p r n  Cough -no infiltrate on CT  Albuterol added  Continue incentive spirometry    Disposition -await PT evaluation, consideration for discharge within the next 24 hours, may require short-term rehab  Discussed with daughter, Linda Sams by phone      Jeremy Soares MD  3/7/5553,9:17 AM

## 2021-02-03 NOTE — PLAN OF CARE
Problem: Potential for Falls  Goal: Patient will remain free of falls  Description: INTERVENTIONS:  - Assess patient frequently for physical needs  -  Identify cognitive and physical deficits and behaviors that affect risk of falls    -  [place] fall precautions as indicated by assessment   - Educate patient/family on patient safety including physical limitations  - Instruct patient to call for assistance with activity based on assessment  - Modify environment to reduce risk of injury  - Consider OT/PT consult to assist with strengthening/mobility  Outcome: Progressing     Problem: PAIN - ADULT  Goal: Verbalizes/displays adequate comfort level or baseline comfort level  Description: Interventions:  - Encourage patient to monitor pain and request assistance  - Assess pain using appropriate pain scale  - Administer analgesics based on type and severity of pain and evaluate response  - Implement non-pharmacological measures as appropriate and evaluate response  - Consider cultural and social influences on pain and pain management  - Notify physician/advanced practitioner if interventions unsuccessful or patient reports new pain  Outcome: Progressing     Problem: INFECTION - ADULT  Goal: Absence or prevention of progression during hospitalization  Description: INTERVENTIONS:  - Assess and monitor for signs and symptoms of infection  - Monitor lab/diagnostic results  - Monitor all insertion sites, i e  indwelling lines, tubes, and drains  - Monitor endotracheal if appropriate and nasal secretions for changes in amount and color  - [place] appropriate cooling/warming therapies per order  - Administer medications as ordered  - Instruct and encourage patient and family to use good hand hygiene technique  - Identify and instruct in appropriate isolation precautions for identified infection/condition  Outcome: Progressing  Goal: Absence of fever/infection during neutropenic period  Description: INTERVENTIONS:  - Monitor WBC    Outcome: Progressing     Problem: SAFETY ADULT  Goal: Patient will remain free of falls  Description: INTERVENTIONS:  - Assess patient frequently for physical needs  -  Identify cognitive and physical deficits and behaviors that affect risk of falls    -  [place] fall precautions as indicated by assessment   - Educate patient/family on patient safety including physical limitations  - Instruct patient to call for assistance with activity based on assessment  - Modify environment to reduce risk of injury  - Consider OT/PT consult to assist with strengthening/mobility  Outcome: Progressing  Goal: Maintain or return to baseline ADL function  Description: INTERVENTIONS:  -  Assess patient's ability to carry out ADLs; assess patient's baseline for ADL function and identify physical deficits which impact ability to perform ADLs (bathing, care of mouth/teeth, toileting, grooming, dressing, etc )  - Assess/evaluate cause of self-care deficits   - Assess range of motion  - Assess patient's mobility; develop plan if impaired  - Assess patient's need for assistive devices and provide as appropriate  - Encourage maximum independence but intervene and supervise when necessary  - Involve family in performance of ADLs  - Assess for home care needs following discharge   - Consider OT consult to assist with ADL evaluation and planning for discharge  - Provide patient education as appropriate  Outcome: Progressing  Goal: Maintain or return mobility status to optimal level  Description: INTERVENTIONS:  - Assess patient's baseline mobility status (ambulation, transfers, stairs, etc )    - Identify cognitive and physical deficits and behaviors that affect mobility  - Identify mobility aids required to assist with transfers and/or ambulation (gait belt, sit-to-stand, lift, walker, cane, etc )  - [place] fall precautions as indicated by assessment  - Record patient progress and toleration of activity level on Mobility SBAR; progress patient to next Phase/Stage  - Instruct patient to call for assistance with activity based on assessment  - Consider rehabilitation consult to assist with strengthening/weightbearing, etc   Outcome: Progressing     Problem: DISCHARGE PLANNING  Goal: Discharge to home or other facility with appropriate resources  Description: INTERVENTIONS:  - Identify barriers to discharge w/patient and caregiver  - Arrange for needed discharge resources and transportation as appropriate  - Identify discharge learning needs (meds, wound care, etc )  - Arrange for interpretive services to assist at discharge as needed  - Refer to Case Management Department for coordinating discharge planning if the patient needs post-hospital services based on physician/advanced practitioner order or complex needs related to functional status, cognitive ability, or social support system  Outcome: Progressing     Problem: Knowledge Deficit  Goal: Patient/family/caregiver demonstrates understanding of disease process, treatment plan, medications, and discharge instructions  Description: Complete learning assessment and assess knowledge base    Interventions:  - Provide teaching at level of understanding  - Provide teaching via preferred learning methods  Outcome: Progressing

## 2021-02-03 NOTE — PROGRESS NOTES
Progress Note  Vivek Link Cuda 80 y o  female MRN: [id number]  Unit/Bed#: -01 Encounter: [id number]    Subjective:  Patient reports some lower abdominal discomfort  No nausea or vomiting  No fevers  Objective:     Vitals:   Vitals:    02/03/21 0900   BP:    Pulse:    Resp:    Temp:    SpO2: 95%   ,Body mass index is 25 02 kg/m²  Intake/Output Summary (Last 24 hours) at 2/3/2021 1355  Last data filed at 2/3/2021 0900  Gross per 24 hour   Intake 1080 ml   Output 200 ml   Net 880 ml       Physical Exam:     General Appearance: Alert, appears stated age and cooperative  Lungs: Clear to auscultation bilaterally, no rales or rhonchi, no labored breathing/accessory muscle use  Heart: Regular rate and rhythm, S1, S2 normal, no murmur, click, rub or gallop  Abdomen: Soft, non-tender, non-distended; bowel sounds normal; no masses or no organomegaly  Extremities: No cyanosis, edema    Invasive Devices     Peripheral Intravenous Line            Peripheral IV 02/01/21 Left Antecubital 2 days                Lab Results:  No results displayed because visit has over 200 results  Imaging Studies:   I have personally reviewed pertinent imaging studies  Xr Chest Portable    Result Date: 2/1/2021  Narrative: CHEST INDICATION:   Dyspnea  COMPARISON:  CT abdomen pelvis 1/28/2021 EXAM PERFORMED/VIEWS:  XR CHEST PORTABLE FINDINGS: Cardiomediastinal silhouette appears unremarkable  Mild bibasilar atelectasis  Left hemidiaphragm is mildly elevated  No pleural effusion or pneumothorax  Osseous structures appear within normal limits for patient age  Impression: Mild bibasilar atelectasis  Workstation performed: [id number]     Ct Chest W Contrast    Result Date: 2/2/2021  Narrative: CT CHEST WITH IV CONTRAST INDICATION:   Colon cancer, non-metastatic, initial workup colon cancer  COMPARISON:  MRI from 1/29/2021; CT from 1/28/2021 TECHNIQUE: CT examination of the chest was performed   Axial, sagittal, and coronal 2D reformatted images were created from the source data and submitted for interpretation  Radiation dose length product (DLP) for this visit:  180 mGy-cm   This examination, like all CT scans performed in the [facility], was performed utilizing techniques to minimize radiation dose exposure, including the use of iterative reconstruction and automated exposure control  IV Contrast:  85 mL of iohexol (OMNIPAQUE) FINDINGS: LUNGS:  2 mm pleural-based nodule is noted in the right upper lobe on image 21  Right upper lobe 2 mm nodule image 26  One or 2 other small nodules less than 4 mm are identified  Bandlike consolidation adjacent to effusions is most likely related to compressive atelectasis  There Is no tracheal or endobronchial lesion  PLEURA:  Bilateral small pleural effusions  HEART/GREAT VESSELS:  Coronary calcification is present    MEDIASTINUM AND MANSI:  Unremarkable  CHEST WALL AND LOWER NECK:   Unremarkable  VISUALIZED STRUCTURES IN THE UPPER ABDOMEN:  Unremarkable  OSSEOUS STRUCTURES:  There is mild wedging of T8 and T9 T9 is unchanged as compared to the abdomen CT  Mild wedging of T5 is also identified  Impression: Small effusions  A few small nodules are identified  Based on current Fleischner Society 2017 Guidelines on incidental pulmonary nodule, patients with a known malignancy are at increased risk of metastasis and should receive initial three month follow-up chest CT  Workstation performed: [id number]     Nm Hepatobiliary W Rx    Result Date: 1/31/2021  Narrative: HEPATOBILIARY SCAN INDICATION:  Abnormal CT and MRI appearance of the gallbladder  Further evaluation for cholecystitis COMPARISON:  CT 1/28/2021, MRI 1/29/2021 TECHNIQUE:   Following the intravenous administration of 4 5 mCi Tc-99m labeled mebrofenin, dynamic abdominal images were obtained over a 60 minute time period  Images were performed in AP projection     FINDINGS: There is normal visualization of the liver parenchyma, common bile duct and small bowel  Initially, the gallbladder did not visualize  Therefore, 2 0 mg of morphine was administered intravenously x1 in the nuclear medicine department  Following morphine administration, prompt visualization of the gallbladder occurred     Impression: Following administration of IV morphine, prompt visualization of the gallbladder, indicating a patent cystic duct  No scintigraphic evidence of acute cholecystitis Workstation performed: [id number]     Mri Abdomen W Wo Contrast And Mrcp    Result Date: 1/30/2021  Narrative: MRI OF THE ABDOMEN WITH AND WITHOUT CONTRAST WITH MRCP INDICATION:  Assess CBD with possible a calculus cholecystitis on CT scan  COMPARISON: CT scan from yesterday  TECHNIQUE:  The following pulse sequences were obtained:  Coronal and axial T2 with TE of 90 and 180 respectively, axial T2 with fat saturation, axial FIESTA fat-sat, axial T1-weighted in-and-out-of phase, axial DWI/ADC, pre-contrast axial T1 with fat saturation, post-contrast dynamic axial T1 with fat saturation at 20, 70, and 180 seconds, followed by coronal and 7 minute delayed axial T1 with fat saturation  3D MRCP images were obtained with radial thick slabs and projections  3D rendering was performed from the acquisition scanner  IV Contrast:  5 mL of Gadobutrol injection (SINGLE-DOSE) FINDINGS: LOWER CHEST:   Unremarkable  LIVER: Normal in size and configuration  No suspicious mass  The hepatic veins and portal veins are patent  BILE DUCTS:  No intrahepatic or extrahepatic bile duct dilation  Common bile duct is normal in caliber  No choledocholithiasis, biliary stricture or suspicious mass  GALLBLADDER:  Gallbladder wall thickening  No stones  PANCREAS:  Normal  No main pancreatic ductal dilation  ADRENAL GLANDS:  Normal  SPLEEN:  Normal  KIDNEYS/PROXIMAL URETERS:  No hydroureteronephrosis  No suspicious renal mass   BOWEL:   Enhancing focus in the ascending colon reidentified suspicious for neoplasm measuring approximately 3 5 cm on image 14/323  PERITONEUM/RETROPERITONEUM:  No ascites  LYMPH NODES:  Upper abdominal adenopathy including a portacaval node measuring 1 2 cm  VASCULAR STRUCTURES:  No aneurysm  ABDOMINAL WALL:  Unremarkable  OSSEOUS STRUCTURES:  No suspicious osseous lesion  Impression: Gallbladder has an abnormal appearance with wall thickening and/or pericholecystic fluid  No stones identified  Possibility is raised of acalculous cholecystitis  CBD is normal in diameter  There is mild nonspecific enhancement of the distal CBD which may represent mild cholangitis  Upper abdominal portacaval lymphadenopathy measuring 1 2 cm  Enhancing focus in the ascending colon wall reidentified suspicious for neoplasm measuring approximately 3 5 cm on image 14/323  Workstation performed: [id number]     Ct Abdomen Pelvis With Contrast    Result Date: 1/28/2021  Narrative: CT ABDOMEN AND PELVIS WITH IV CONTRAST INDICATION:   Abdominal pain, acute, nonlocalized pain,l transaminitis  COMPARISON:  None  TECHNIQUE:  CT examination of the abdomen and pelvis was performed  Axial, sagittal, and coronal 2D reformatted images were created from the source data and submitted for interpretation  Radiation dose length product (DLP) for this visit:  538 65 mGy-cm   This examination, like all CT scans performed in the [facility], was performed utilizing techniques to minimize radiation dose exposure, including the use of iterative  reconstruction and automated exposure control  IV Contrast:  100 mL of iohexol (OMNIPAQUE) Enteric Contrast:  Enteric contrast was not administered  FINDINGS: ABDOMEN LOWER CHEST:  No clinically significant abnormality identified in the visualized lower chest  LIVER/BILIARY TREE:  Mild intrahepatic ductal dilatation  Extrahepatic CBD with mild mucosal enhancement  GALLBLADDER:  No calcified stones identified  Minimal pericholecystic fluid   SPLEEN: Unremarkable  PANCREAS:  Unremarkable  ADRENAL GLANDS:  Unremarkable  KIDNEYS/URETERS:  Unremarkable  No hydronephrosis  STOMACH AND BOWEL:  Colonic diverticulosis  Soft tissue mass in the ascending colon just above the ileocecal valve consistent with neoplasm measuring 3 9 x 2 1 cm  Multiple small mesenteric nodes are noted adjacent to the mass seen on image 601/65  APPENDIX:  No findings to suggest appendicitis  ABDOMINOPELVIC CAVITY:  No ascites  No pneumoperitoneum  Small mesenteric nodes in the right lower quadrant adjacent to the colon mass  VESSELS:  Unremarkable for patient's age  PELVIS REPRODUCTIVE ORGANS:  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  URINARY BLADDER:  Unremarkable  ABDOMINAL WALL/INGUINAL REGIONS:  Unremarkable  OSSEOUS STRUCTURES:  No acute fracture or destructive osseous lesion  Advanced degenerative changes of the lumbar spine  Impression: Soft tissue mass in the ascending colon just above the ileocecal valve consistent with neoplasm measuring 3 9 x 2 1 cm  Multiple small mesenteric nodes are noted adjacent to the mass seen on image 601/65  Recommend colonoscopy  Pericholecystic fluid  No calcified gallstones  Possible acalculous cholecystitis  Correlate clinically  Consider ultrasound if any clinical ambiguity  Mild nonspecific enhancement of the extrahepatic CBD  This can be seen in the setting of cholangitis  The study was marked in [facility] for immediate notification  Workstation performed: [id number]         Assessment & Plan    Elevated LFTs     - Elevated LFTs are suspected to be secondary to chronic Tylenol toxicity  - She is s/p a NAC course  - MRI/MRCP did not show CBD dilation, no evidence of a stricture/stone  - Acute hepatitis panel was negative  Monospot was negative  Smooth muscle ab negative  Follow up JACINTA and AMA  - HIDA Scan was negative  - LFTs are trending down overall for AST, ALT, and TB - ALP trended up mildly today    Continue to trend CMP daily   - Initially there was some concern for cholangitis but as patient did not meet criteria for cholangitis, Zosyn was discontinued 2/1      Colonic Mass    - CT on admission noted a soft tissue mass in the ascending colon just above the ileocecal valve consistent with possible neoplasm   - Colonoscopy yesterday showed a large mass of the ascending colon covering 1/3 the circumference  - CEA is elevated  CT of the chest showed a few small lung nodules  - Follow up path  - Surgery and oncology consultations  The patient will be seen by Dr Thomas Albarado PA-C  2/3/2021,1:55 PM

## 2021-02-03 NOTE — PLAN OF CARE
Problem: PHYSICAL THERAPY ADULT  Goal: Performs mobility at highest level of function for planned discharge setting  See evaluation for individualized goals  Description: Treatment/Interventions: Functional transfer training, LE strengthening/ROM, Therapeutic exercise, Endurance training, Patient/family training, Equipment eval/education, Bed mobility, Gait training, Spoke to nursing, OT          See flowsheet documentation for full assessment, interventions and recommendations  2/3/2021 1426 by Willian Solis PT  Outcome: Progressing  Note: Prognosis: Good  Problem List: Decreased strength, Decreased endurance, Impaired balance, Decreased mobility, Impaired vision  Assessment: Pt seen for PT treatment session this date with interventions consisting of gait training w/ emphasis on improving pt's ability to ambulate level surfaces x 15' x2 with min A provided by therapist with RW  Pt agreeable to PT treatment session upon arrival, pt found supine in bed w/ HOB elevated, in no apparent distress and responsive, Pt with call light on requesting to use bathroom  PT providing assistance for gait training to/from bathroom c use of RW  VC for technique, posture, management of RW  Post session: pt returned BTB, bed alarm engaged, all needs in reach and RN notified of session findings/recommendations  Continue to recommend STR at time of d/c in order to maximize pt's functional independence and safety w/ mobility  Pt continues to be functioning below baseline level, and remains limited 2* factors listed above  PT will continue to see pt while here in order to address the deficits listed above and provide interventions consistent w/ POC in effort to achieve STGs    Barriers to Discharge: Decreased caregiver support  Barriers to Discharge Comments: patient reports her daughter will be staying with her at discharge  PT Discharge Recommendation: [address]     PT - OK to Discharge: No    See flowsheet documentation for full assessment  2/3/2021 1330 by Bhumi Johnson PT  Outcome: Progressing  Note: Prognosis: Good  Problem List: Decreased strength, Decreased endurance, Impaired balance, Decreased mobility, Impaired vision  Assessment: Pt seen for PT treatment session this date with interventions consisting of gait training w/ emphasis on improving pt's ability to ambulate level surfaces x 40' x2 with min A provided by therapist with RW and Therapeutic exercise consisting of: AROM 10 reps B LE in sitting position  Pt agreeable to PT treatment session upon arrival, pt found supine in bed w/ HOB elevated, in no apparent distress and responsive  In comparison to previous session, pt with improvements in household distance gait tolerance & initiation of B LE exercises; pt denies any pain c exercises  pt requiring min A for transfers & gait training trial c RW  Post session: pt returned BTB, bed alarm engaged and all needs in reach  Recommend STR at time of d/c in order to maximize pt's functional independence and safety w/ mobility  Pt continues to be functioning below baseline level, and remains limited 2* factors listed above  PT will continue to see pt while here in order to address the deficits listed above and provide interventions consistent w/ POC in effort to achieve STGs  Barriers to Discharge: Decreased caregiver support  Barriers to Discharge Comments: patient reports her daughter will be staying with her at discharge  PT Discharge Recommendation: [address]     PT - OK to Discharge: No    See flowsheet documentation for full assessment

## 2021-02-03 NOTE — TELEPHONE ENCOUNTER
Please call Deepthi Chatman back    Deepthi Chatman wants to know if pt should get up an walk around a little today so pt doesn't get pneumonia

## 2021-02-03 NOTE — TELEPHONE ENCOUNTER
Daughter is requesting that zeyad call her back has a lot of questions about being discharged     # [phone]

## 2021-02-03 NOTE — PHYSICAL THERAPY NOTE
Physical Therapy Treatment Note       02/03/21 1210   PT Last Visit   PT Visit Date 02/03/21   Note Type   Note Type Treatment   Pain Assessment   Pain Assessment Tool 0-10   Pain Score   (pt did not report numeric score)   Pain Location/Orientation Location: Abdomen   Restrictions/Precautions   Weight Bearing Precautions Per Order No   Braces or Orthoses   (none reported)   Other Precautions Visual impairment; Bed Alarm; Chair Alarm; Fall Risk   General   Chart Reviewed Yes   Response to Previous Treatment Patient with no complaints from previous session  Family/Caregiver Present No   Cognition   Overall Cognitive Status WFL   Arousal/Participation Alert; Responsive; Cooperative   Attention Within functional limits   Orientation Level Oriented X4   Memory Decreased short term memory   Following Commands Follows all commands and directions without difficulty   Comments pt agreeable to PT session   Subjective   Subjective "I feel very shaky"   Bed Mobility   Supine to Sit 5  Supervision   Additional items Assist x 1;HOB elevated; Increased time required;Verbal cues   Sit to Supine 5  Supervision   Additional items Assist x 1;HOB elevated; Increased time required;Verbal cues   Transfers   Sit to Stand 4  Minimal assistance   Additional items Assist x 1; Armrests; Increased time required;Verbal cues   Stand to Sit 4  Minimal assistance   Additional items Assist x 1; Armrests; Increased time required;Verbal cues   Ambulation/Elevation   Gait pattern Decreased foot clearance; Short stride; Step to   Gait Assistance 4  Minimal assist   Additional items Assist x 1;Verbal cues   Assistive Device Rolling walker   Distance 40' x2   Stair Management Assistance Not tested   Balance   Static Sitting Good   Dynamic Sitting Fair +   Static Standing Fair   Dynamic Standing Fair -   Ambulatory Fair -   Endurance Deficit   Endurance Deficit Yes   Activity Tolerance   Activity Tolerance Patient limited by Sonya Sanchez Dr  Nic - requesting pt be seen for updated recommendations   Nurse Made Aware PRICE Callaway verbalized pt appropriate to see, made aware of session outcome/recs   Exercises   Heelslides Sitting;10 reps;AROM; Bilateral   Hip Abduction Sitting;10 reps;AROM; Bilateral   Knee AROM Short Arc Quad Sitting;10 reps;AROM; Bilateral   Knee AROM Long Arc Quad Sitting;10 reps;AROM; Bilateral   Assessment   Prognosis Good   Problem List Decreased strength;Decreased endurance; Impaired balance;Decreased mobility; Impaired vision   Assessment Pt seen for PT treatment session this date with interventions consisting of gait training w/ emphasis on improving pt's ability to ambulate level surfaces x 40' x2 with min A provided by therapist with RW and Therapeutic exercise consisting of: AROM 10 reps B LE in sitting position  Pt agreeable to PT treatment session upon arrival, pt found supine in bed w/ HOB elevated, in no apparent distress and responsive  In comparison to previous session, pt with improvements in household distance gait tolerance & initiation of B LE exercises; pt denies any pain c exercises  pt requiring min A for transfers & gait training trial c RW  Post session: pt returned BTB, bed alarm engaged and all needs in reach  Recommend STR at time of d/c in order to maximize pt's functional independence and safety w/ mobility  Pt continues to be functioning below baseline level, and remains limited 2* factors listed above  PT will continue to see pt while here in order to address the deficits listed above and provide interventions consistent w/ POC in effort to achieve STGs  Barriers to Discharge Decreased caregiver support   Goals   Patient Goals "to get stronger"   STG Expiration Date 02/08/21   Short Term Goal #1 STGs remain appropriate   PT Treatment Day 1   Plan   Treatment/Interventions Functional transfer training;LE strengthening/ROM; Therapeutic exercise; Endurance training;Patient/family training;Equipment eval/education; Bed mobility;Gait training;Spoke to nursing;Spoke to case management;Spoke to MD   Progress Slow progress, decreased activity tolerance   PT Frequency   (3-5x/wk)   Recommendation   PT Discharge Recommendation Post-Acute Rehabilitation Services   Equipment Recommended Walker  (RW)   PT - OK to Discharge No   AM-PAC Basic Mobility Inpatient   Turning in Bed Without Bedrails 3   Lying on Back to Sitting on Edge of Flat Bed 3   Moving Bed to Chair 3   Standing Up From Chair 3   Walk in Room 3   Climb 3-5 Stairs 2   Basic Mobility Inpatient Raw Score 17   Basic Mobility Standardized Score 39 67    The patient's AM-PAC Basic Mobility Inpatient Short Form Raw Score is 18, Standardized Score is 41 05  A standardized score less than 42 9 suggests the patient may benefit from discharge to post-acute rehabilitation services  Please also refer to the recommendation of the Physical Therapist for safe discharge planning             Shila Ogden, PT, DPT    Time of PT treatment session: [id number]

## 2021-02-03 NOTE — TELEPHONE ENCOUNTER
Please let Alesia Lemus know that I spoke to the physical therapist just after my visit and they were going to go in and work with her to determine her stability to be discharged home verses the need for subacute rehab  She may be stable for discharge as soon as tomorrow

## 2021-02-03 NOTE — CONSULTS
Consultation - General Surgery   Annalisa Martinez 80 y o  female MRN: [id number]  Unit/Bed#: -01 Encounter: [id number]    ASSESSMENT/PLAN:    Proximal ascending colon mass  -2/2 Colonoscopy showed single malignant-appearing, multilobular mass in prox ascending colon, covering 1/3 circumference  -Was present with Dr Matthew Sharma who had discussion about colonoscopy results and possibility for surgical intervention  Not recommending surgery during this admission due to coinciding elevated transaminases    -Cardiology workup was ordered for pre-op evaluation and risk stratification   -Elevated CEA (5 3)  -Awaiting final pathology results     Transaminitis  -Trending down: 598 AST, 567 ALT, 3 00 TBili  -Likely s/s to Tylenol toxicity, was responding to NAC   -GI following, appreciate input     _______________________________________________________________  Physician Requesting Consult: Pao Bobby MD    Additional consultants: Cardiology    Reason for Consult / Principal Problem: Proximal ascending colon mass, likely malignant     HPI: Annalisa Martinez is a 80y o  year old female who was admitted for weakness, jaundice  During workup for transaminitis, patient had CTAP reveal ascending colonoic mass and she underwent FU colonoscopy on 2/2  Patient is doing well, no acute complains  No current n/v/c/d  Having mild generalized abdominal discomfort 3/10  Dr Matthew Sharma had discussion about colonoscopy results and option for surgical intervention  She is interested in having "whatever needs to be done to be done " She is aware of results and says that she has a family hx of colon CA  Her sister had colonic resection with colostomy  Last colonoscopy was over 10 years ago and they told her "it was fine and she did not need to get another one", likely due to advanced age  Denies ever having abdominal sx including change in bowel habits, diarrhea/constipation, feeling of incomplete evacuation, melena, or hematochezia   Says that she does not know if there has been blood in the stool because she has bad vision and can not always see what her BM looks like  She does have a BM every day, and has never struggled with constipation  Denies fevers/chills, weight loss  Has been very weak and fatigued which brought her in to the hospital  She did have normal BM today, no troubles voiding  Historical Information   Past Medical History:   Diagnosis Date    Hyperlipidemia     Osteoporosis     Sciatica      Past Surgical History:   Procedure Laterality Date     SECTION       Social History   Social History     Substance and Sexual Activity   Alcohol Use Not Currently     Social History     Substance and Sexual Activity   Drug Use No     Social History     Tobacco Use   Smoking Status Never Smoker   Smokeless Tobacco Never Used     Family History:   Family History   Problem Relation Age of Onset    Diabetes type II Mother     Pancreatic cancer Father     Colon cancer Sister        Meds/Allergies   Home meds:   Prior to Admission medications    Medication Sig Start Date End Date Taking?  Authorizing Provider   acetaminophen (TYLENOL) 500 mg tablet Take 500 mg by mouth as needed for mild pain    Historical Provider, MD   atorvastatin (LIPITOR) 20 mg tablet Take 1 tablet (20 mg total) by mouth daily 21   Anuradha Fulton MD   cetirizine (ZyrTEC) 10 mg tablet Take 10 mg by mouth daily    Historical Provider, MD   Cholecalciferol ([employer] VITAMIN D) 2000 units CAPS Take by mouth    Historical Provider, MD   mirtazapine (REMERON) 7 5 MG tablet Take 1 tablet (7 5 mg total) by mouth daily at bedtime 21   Anuradha Fulton MD   Multiple Vitamins-Minerals (CENTRUM SILVER 50+WOMEN) TABS Take by mouth    Historical Provider, MD   RHOPRESSA 0 02 % SOLN instill 1 drop into both eyes at bedtime 18   Historical Provider, MD Lauri Redding 1-0 2 % SUSP  18   Historical Provider, MD   VYZULTA 0 024 % SOLN  18   Historical Provider, MD Scheduled Meds:  Current Facility-Administered Medications   Medication Dose Route Frequency Provider Last Rate    Brinzolamide-Brimonidine  1 drop Both Eyes BID Christophe Mensah DO      heparin (porcine)  5,000 Units Subcutaneous Q12H [facility] Christophe Mensah DO      ibuprofen  400 mg Oral Q6H PRN Sterling Thomas MD      Latanoprostene Bunod  1 drop Both Eyes HS Christophe Mensah, DO      mirtazapine  15 mg Oral HS Sterling Thomas MD      Netarsudil Dimesylate  1 drop Both Eyes HS Christophe Mensah,       ondansetron  4 mg Intravenous Q6H PRN Christophe Mensah DO      pantoprazole  40 mg Oral Early Morning Sterling Thomas MD       Continuous Infusions:   PRN Meds:  ibuprofen    ondansetron    ALLERGIES: No Known Allergies    Review of Systems:  General: negative for - chills, fever or weight loss, +fatigue, +weakness  Cardiovascular: no chest pain or dyspnea on exertion  Respiratory: no cough, shortness of breath, or wheezing  Gastrointestinal: negative for - abdominal pain, appetite loss, blood in stools, change in bowel habits, change in stools, constipation, diarrhea, melena or nausea/vomiting  Genitourinary: no dysuria, trouble voiding, or hematuria  Musculoskeletal: negative for - muscle pain  Neurological: negative for - headaches, numbness/tingling, speech problems or visual changes  Hematological and Lymphatic: negative for - bleeding problems  Dermatological : negative for rash and skin lesion changes  Psychological: negative for - behavioral disorder or irritability  Ophthalmic: negative for - double vision, blurry vision or eye pain, + decreased vision at baseline  ENT: negative for - hearing change, nasal congestion, nasal discharge, sore throat or visual changes  All other ROS negative, except as stated in HPI above  Objective   Vitals:  Blood pressure 123/58, pulse 69, temperature 98 °F (36 7 °C), resp   rate 16, height 4' 11" (1 499 m), weight 56 2 kg (123 lb 14 4 oz), SpO2 95 %, not currently breastfeeding  Body mass index is 25 02 kg/m²  SpO2: SpO2: 95 %, SpO2 Activity: SpO2 Activity: At Rest, SpO2 Device: O2 Device: None (Room air), Capnography:      I/Os:  I/O       02/01 0701 - 02/02 0700 02/02 0701 - 02/03 0700 02/03 0701 - 02/04 0700    P  O  240 180 180    I V  (mL/kg)  1050 (18 8)     Total Intake(mL/kg) 240 (4 3) 1230 (22) 180 (3 2)    Urine (mL/kg/hr)  200 (0 1)     Total Output  200     Net +240 +1030 +180           Unmeasured Urine Occurrence 1 x 1 x 1 x    Unmeasured Stool Occurrence 2 x            Invasive Lines/Tubes:  Invasive Devices     Peripheral Intravenous Line            Peripheral IV 02/01/21 Left Antecubital 2 days                Physical Exam  Vitals signs and nursing note reviewed  Constitutional:       General: She is not in acute distress  Appearance: She is not ill-appearing  HENT:      Head: Normocephalic and atraumatic  Mouth/Throat:      Mouth: Mucous membranes are dry  Eyes:      General: No scleral icterus  Conjunctiva/sclera: Conjunctivae normal    Neck:      Musculoskeletal: Normal range of motion and neck supple  Cardiovascular:      Rate and Rhythm: Normal rate and regular rhythm  Heart sounds: Normal heart sounds  No murmur  Pulmonary:      Effort: Pulmonary effort is normal       Breath sounds: Normal breath sounds  No wheezing, rhonchi or rales  Abdominal:      General: A surgical scar is present  Bowel sounds are normal  There is no distension  Palpations: Abdomen is soft  There is no mass  Tenderness: There is abdominal tenderness in the right upper quadrant and epigastric area  There is no right CVA tenderness, left CVA tenderness, guarding or rebound  Comments: C-sec scar, spots of ecchymosis, excoriations, no melena/hematochezia   Musculoskeletal: Normal range of motion  Right lower leg: No edema  Left lower leg: No edema  Skin:     General: Skin is warm and dry        Coloration: Skin is not jaundiced  Findings: No erythema or rash  Neurological:      General: No focal deficit present  Mental Status: She is alert and oriented to person, place, and time  Psychiatric:         Mood and Affect: Mood normal          Behavior: Behavior normal          Thought Content:  Thought content normal          Judgment: Judgment normal          Lab Results and Cultures:   CBC:   Results from last 7 days   Lab Units 02/03/21  0450 02/02/21  0532 02/01/21  0513 01/31/21  0444 01/30/21  0502   WBC Thousand/uL 6 41 7 18 7 04 4 92 5 82   HEMOGLOBIN g/dL 11 2* 12 2 11 5 11 3* 11 2*   HEMATOCRIT % 33 3* 37 2 34 3* 34 1* 34 8   PLATELETS Thousands/uL 274 263 229 217 210     BMP/CMP:  Results from last 7 days   Lab Units 02/03/21  0450 02/02/21  0532 02/01/21  0513 01/31/21  0444 01/30/21  0502   POTASSIUM mmol/L 3 5 4 4 3 7 3 8 3 4*   CHLORIDE mmol/L 106 106 108 109* 106   CO2 mmol/L 28 28 22 22 23   BUN mg/dL 9 7 8 11 14   CREATININE mg/dL 0 77 0 70 0 79 0 89 1 00   CALCIUM mg/dL 7 9* 8 4 7 7* 8 0* 7 9*     Coags:   Results from last 7 days   Lab Units 02/01/21  0513 01/30/21  0502 01/29/21  0451 01/28/21  1759   INR  1 15 1 34* 1 18 1 13   PTT seconds  --   --   --  29     Lipid panel:   Results from last 7 days   Lab Units 01/28/21  0933   TRIGLYCERIDES mg/dL 205*   HDL mg/dL 9*     HgbA1c:   Lab Results   Component Value Date    HGBA1C 5 7 (H) 01/28/2021    HGBA1C 5 8 (H) 08/25/2020    HGBA1C 5 9 (H) 02/13/2020    HGBA1C 5 9 07/10/2019    HGBA1C 5 9 01/28/2019    HGBA1C 5 8 07/19/2018    HGBA1C 6 0 01/02/2018    HGBA1C 5 8 05/11/2017    HGBA1C 5 8 11/21/2016    HGBA1C 5 8 (H) 05/03/2016       Urinalysis:   Lab Results   Component Value Date    COLORU Yellow 01/28/2021    CLARITYU Clear 01/28/2021    SPECGRAV 1 015 01/28/2021    PHUR 5 5 01/28/2021    LEUKOCYTESUR Negative 01/28/2021    NITRITE Positive (A) 01/28/2021    GLUCOSEU Negative 01/28/2021    KETONESU Negative 01/28/2021    BILIRUBINUR Small (A) 01/28/2021    BLOODU Negative 01/28/2021   ,   Urine Culture:   Lab Results   Component Value Date    URINECX <10,000 cfu/ml  10/28/2019     Wound Culure: No results found for: WOUNDCULT  Blood Culture: No results found for: BLOODCX    Imaging Studies: I have personally reviewed pertinent reports  Xr Chest Portable    Result Date: 2/1/2021  Impression: Mild bibasilar atelectasis  Workstation performed: [id number]     Ct Chest W Contrast    Result Date: 2/2/2021  Impression: Small effusions  A few small nodules are identified  Based on current Fleischner Society 2017 Guidelines on incidental pulmonary nodule, patients with a known malignancy are at increased risk of metastasis and should receive initial three month follow-up chest CT  Workstation performed: [id number]     Nm Hepatobiliary W Rx    Result Date: 1/31/2021  Impression: Following administration of IV morphine, prompt visualization of the gallbladder, indicating a patent cystic duct  No scintigraphic evidence of acute cholecystitis Workstation performed: [id number]     Mri Abdomen W Wo Contrast And Mrcp    Result Date: 1/30/2021  Impression: Gallbladder has an abnormal appearance with wall thickening and/or pericholecystic fluid  No stones identified  Possibility is raised of acalculous cholecystitis  CBD is normal in diameter  There is mild nonspecific enhancement of the distal CBD which may represent mild cholangitis  Upper abdominal portacaval lymphadenopathy measuring 1 2 cm  Enhancing focus in the ascending colon wall reidentified suspicious for neoplasm measuring approximately 3 5 cm on image 14/323  Workstation performed: [id number]     Ct Abdomen Pelvis With Contrast    Result Date: 1/28/2021  Impression: Soft tissue mass in the ascending colon just above the ileocecal valve consistent with neoplasm measuring 3 9 x 2 1 cm  Multiple small mesenteric nodes are noted adjacent to the mass seen on image 601/65  Recommend colonoscopy  Pericholecystic fluid  No calcified gallstones  Possible acalculous cholecystitis  Correlate clinically  Consider ultrasound if any clinical ambiguity  Mild nonspecific enhancement of the extrahepatic CBD  This can be seen in the setting of cholangitis  The study was marked in [facility] for immediate notification  Workstation performed: [id number]      EKG, Pathology, and Other Studies: I have personally reviewed pertinent reports  VTE Prophylaxis: Heparin     Code Status: Level 3 - DNAR and DNI  Advance Directive and Living Will: Yes    Power of :    POLST:      Counseling / Coordination of Care  Counseling/Coordination of Care: Total floor / unit time spent today 30 minutes  Greater than 50% of total time was spent with the patient and / or family counseling and / or coordination of care   A description of the counseling / coordination of care: Was present for discussion with Dr Aaron regarding colonoscopy results and discussion of possible future surgical intervention       Jenny Garces PA-C  2/3/2021

## 2021-02-03 NOTE — PHYSICAL THERAPY NOTE
Physical Therapy Treatment Note       02/03/21 1403   PT Last Visit   PT Visit Date 02/03/21   Note Type   Note Type Treatment   Pain Assessment   Pain Assessment Tool 0-10   Pain Score No Pain   Restrictions/Precautions   Weight Bearing Precautions Per Order No   Braces or Orthoses   (none reported)   Other Precautions Visual impairment; Bed Alarm; Chair Alarm; Fall Risk   General   Chart Reviewed Yes   Response to Previous Treatment Patient with no complaints from previous session  Family/Caregiver Present No   Cognition   Overall Cognitive Status WFL   Arousal/Participation Alert; Responsive; Cooperative   Attention Within functional limits   Orientation Level Oriented X4   Following Commands Follows all commands and directions without difficulty   Subjective   Subjective "I need to use the bathroom"   Bed Mobility   Supine to Sit 4  Minimal assistance   Additional items Assist x 1;Bedrails; Increased time required;LE management   Sit to Supine 5  Supervision   Additional items Assist x 1;HOB elevated; Increased time required;Verbal cues   Transfers   Sit to Stand 4  Minimal assistance   Additional items Assist x 1; Increased time required;Verbal cues;Armrests   Stand to Sit 4  Minimal assistance   Additional items Assist x 1; Increased time required;Verbal cues;Armrests   Toilet transfer 4  Minimal assistance   Additional items Assist x 1; Increased time required;Verbal cues;Standard toilet  (grab bar)   Ambulation/Elevation   Gait pattern Decreased foot clearance; Short stride; Step to   Gait Assistance 4  Minimal assist   Additional items Assist x 1;Verbal cues   Assistive Device Rolling walker   Distance 15'x 2   Balance   Static Sitting Good   Dynamic Sitting Fair +   Static Standing Fair   Dynamic Standing Fair -   Ambulatory Fair -   Endurance Deficit   Endurance Deficit Yes   Activity Tolerance   Activity Tolerance Patient limited by fatigue   Medical Staff Made Aware EKATERINA Nelson   Nurse Made Aware PRICE Leos verbalized pt appropriate to see, made aware of session outcome/recs   Assessment   Prognosis Good   Problem List Decreased strength;Decreased endurance; Impaired balance;Decreased mobility; Impaired vision   Assessment Pt seen for PT treatment session this date with interventions consisting of gait training w/ emphasis on improving pt's ability to ambulate level surfaces x 15' x2 with min A provided by therapist with RW  Pt agreeable to PT treatment session upon arrival, pt found supine in bed w/ HOB elevated, in no apparent distress and responsive, Pt with call light on requesting to use bathroom  PT providing assistance for gait training to/from bathroom c use of   VC for technique, posture, management of RW  Post session: pt returned BTB, bed alarm engaged, all needs in reach and RN notified of session findings/recommendations  Continue to recommend STR at time of d/c in order to maximize pt's functional independence and safety w/ mobility  Pt continues to be functioning below baseline level, and remains limited 2* factors listed above  PT will continue to see pt while here in order to address the deficits listed above and provide interventions consistent w/ POC in effort to achieve STGs  Barriers to Discharge Decreased caregiver support   Goals   Patient Goals to get BTB at rest   STG Expiration Date 02/08/21   PT Treatment Day 2   Plan   Treatment/Interventions Functional transfer training;LE strengthening/ROM; Therapeutic exercise; Endurance training;Patient/family training;Equipment eval/education; Bed mobility;Gait training;Spoke to nursing;Spoke to case management   Progress Slow progress, decreased activity tolerance   PT Frequency   (3-5x/wk)   Recommendation   PT Discharge Recommendation Post-Acute Rehabilitation Services   Equipment Recommended Walker  (RW)   Eulogio Vankel 435   Turning in Bed Without Bedrails 3   Lying on Back to Sitting on Edge of Flat Bed 3   Moving Bed to Chair 3 Standing Up From Chair 3   Walk in Room 3   Climb 3-5 Stairs 2   Basic Mobility Inpatient Raw Score 17   Basic Mobility Standardized Score 39 67    The patient's AM-PAC Basic Mobility Inpatient Short Form Raw Score is 17, Standardized Score is 39 67  A standardized score less than 42 9 suggests the patient may benefit from discharge to post-acute rehabilitation services  Please also refer to the recommendation of the Physical Therapist for safe discharge planning           Agustin Fritz PT, DPT

## 2021-02-03 NOTE — CONSULTS
Consultation - Cardiology Team Elis Rinaldi 80 y o  female MRN: [id number]  Unit/Bed#: -01 Encounter: [id number]    Inpatient consult to Cardiology  Consult performed by: AMNA Hubbard  Consult ordered by: ROSIBEL Sanchez          Physician Requesting Consult: Bacilio Carrasco MD  Reason for Consult / Principal Problem:  Preoperative risk stratification    Assessment/Plan:    1  Preoperative risk stratification  -Bette Jose is tentatively scheduled for surgical intervention of a large mass in the proximal ascending colon the near future  -she states prior to the last 2 weeks she has been very active without any type of exertional chest pain, for shortness of breath  -she denies any prior cardiac history  -she is at least at low risk for this tentative procedure with a RCRI of 0 9%   -TTE to be completed prior to discharge  -no further cardiac testing at this time    2  Proximal ascending colon mass  -biopsied via colonoscopy on 02/01/2021  -surgery consult showed    3  Hyperlipidemia  -patient takes atorvastatin at home, but currently on hold given elevated LFTs        HPI: 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 who has a history of hyperlipidemia, macular degeneration, esophageal reflux,   who presented to the emergency room on 01/28/2021 4 severe weakness that was gradually increasing over the last 2 weeks  Patient also became jaundiced in the last 24-48 hours and saw her PCP was referred to come to the emergency room  She was admitted and worked up and found to have elevated transaminases and CT findings were concerning for acalculous cholecystitis versus cholangitis versus cholangiocarcinoma as there also was noted to soft tissue mass in the ascending colon concerning for colon cancer  She underwent colonoscopy yesterday which revealeda large mass in the proximal ascending colon    General surgery has been consulted as planning for surgical intervention in the near future      REVIEW OF SYSTEMS:  Constitutional:  Denies fever or chills, +weakness  Eyes:  Denies change in visual acuity   HENT:  Denies nasal congestion or sore throat   Respiratory:  Denies cough, orthopnea, PND or shortness of breath   Cardiovascular:  Denies chest pain, palpitations or edema   GI:  Denies abdominal pain, nausea, vomiting, bloody stools or diarrhea   :  Denies dysuria, frequency, difficulty in micturition and nocturia  Musculoskeletal:  Denies back pain or joint pain   Neurologic:  Denies headache, focal weakness or sensory changes   Endocrine:  Denies polyuria or polydipsia   Lymphatic:  Denies swollen glands   Psychiatric:  Denies depression or anxiety     Historical Information   Past Medical History:   Diagnosis Date    Hyperlipidemia     Osteoporosis     Sciatica      Past Surgical History:   Procedure Laterality Date     SECTION       Social History     Substance and Sexual Activity   Alcohol Use Not Currently     Social History     Substance and Sexual Activity   Drug Use No     Social History     Tobacco Use   Smoking Status Never Smoker   Smokeless Tobacco Never Used       Family History:   Family History   Problem Relation Age of Onset    Diabetes type II Mother     Pancreatic cancer Father     Colon cancer Sister        MEDS & ALLERGIES:  all current active meds have been reviewed and current meds:   Current Facility-Administered Medications   Medication Dose Route Frequency    Brinzolamide-Brimonidine 1-0 2 % SUSP 1 drop  1 drop Both Eyes BID    heparin (porcine) subcutaneous injection 5,000 Units  5,000 Units Subcutaneous Q12H [address]    ibuprofen (MOTRIN) tablet 400 mg  400 mg Oral Q6H PRN    Latanoprostene Bunod 0 024 % SOLN 1 drop  1 drop Both Eyes HS    mirtazapine (REMERON) tablet 15 mg  15 mg Oral HS    Netarsudil Dimesylate 0 02 % SOLN 1 drop  1 drop Both Eyes HS    ondansetron (ZOFRAN) injection 4 mg  4 mg Intravenous Q6H PRN    pantoprazole (PROTONIX) EC tablet 40 mg  40 mg Oral Early Morning        No Known Allergies    OBJECTIVE:  Vitals:   Vitals:    21 0900   BP:    Pulse:    Resp:    Temp:    SpO2: 95%     Body mass index is 25 02 kg/m²  Systolic (76HAJ), AR , Min:120 , VEK:131     Diastolic (13KDO), EJE:41, Min:58, Max:58      Intake/Output Summary (Last 24 hours) at 2/3/2021 1409  Last data filed at 2/3/2021 0900  Gross per 24 hour   Intake 1080 ml   Output 200 ml   Net 880 ml     Weight (last 2 days)     None        Invasive Devices     Peripheral Intravenous Line            Peripheral IV 21 Left Antecubital 2 days                PHYSICAL EXAMS:  General:  Patient is not in acute distress, laying in the bed comfortably, awake, alert   Head: Normocephalic, Atraumatic     HEENT: White sclera, pink conjunctiva  Neck:  Supple, no neck vein distention  Respiratory: clear to P/A  Cardiovascular:  PMI normal, S1-S2 normal, no murmurs, thrills, gallops, rubs, regular rhythm  GI:  Abdomen soft, non-tender, non-distended  Extremities: No edema, normal pulses  Integument:  No skin rashes or ulceration  Lymphatic:  No cervical or inguinal lymphadenopathy  Neurologic:  Patient is awake alert, responding to command, oriented to person, place and time     LABORATORY RESULTS:  Results from last 7 days   Lab Units 21  1759   TROPONIN I ng/mL <0 02     CBC with diff:   Results from last 7 days   Lab Units 21  0532 21  0513   WBC Thousand/uL 6 41 7 18 7 04   HEMOGLOBIN g/dL 11 2* 12 2 11 5   HEMATOCRIT % 33 3* 37 2 34 3*   MCV fL 89 90 90   PLATELETS Thousands/uL 274 263 229   MCH pg 29 9 29 5 30 0   MCHC g/dL 33 6 32 8 33 5   RDW % 16 5* 16 3* 16 2*   MPV fL 10 3 10 6 10 5   NRBC AUTO /100 WBCs 0 0 0       CMP:  Results from last 7 days   Lab Units 21  0450 21  0532 21  0513   POTASSIUM mmol/L 3 5 4 4 3 7   CHLORIDE mmol/L 106 106 108   CO2 mmol/L 28 28 22   BUN mg/dL 9 7 8   CREATININE mg/dL 0  77 0 70 0 79   CALCIUM mg/dL 7 9* 8 4 7 7*   AST U/L 598* 662* 649*  648*   ALT U/L 567* 663* 696*  696*   ALK PHOS U/L 371* 356* 353*  349*   EGFR ml/min/1 73sq m 68 76 66       BMP:  Results from last 7 days   Lab Units 02/03/21  0450 02/02/21  0532 02/01/21  0513   POTASSIUM mmol/L 3 5 4 4 3 7   CHLORIDE mmol/L 106 106 108   CO2 mmol/L 28 28 22   BUN mg/dL 9 7 8   CREATININE mg/dL 0 77 0 70 0 79   CALCIUM mg/dL 7 9* 8 4 7 7*            Results from last 7 days   Lab Units 01/29/21  0451   MAGNESIUM mg/dL 1 9     Results from last 7 days   Lab Units 01/28/21  0933   HEMOGLOBIN A1C % 5 7*     Results from last 7 days   Lab Units 01/28/21  0933   TSH 3RD GENERATON uIU/mL 2 680     Results from last 7 days   Lab Units 02/01/21  0513 01/30/21  0502 01/29/21  0451   INR  1 15 1 34* 1 18       Lipid Profile:   Lab Results   Component Value Date    CHOL 192 10/24/2015    CHOL 198 04/01/2015    CHOL 190 10/01/2014     Lab Results   Component Value Date    HDL 9 (L) 01/28/2021    HDL 48 08/25/2020    HDL 55 02/13/2020     Lab Results   Component Value Date    LDLCALC 104 (H) 01/28/2021    LDLCALC 109 (H) 08/25/2020    LDLCALC 124 (H) 02/13/2020     Lab Results   Component Value Date    TRIG 205 (H) 01/28/2021    TRIG 102 08/25/2020    TRIG 200 (H) 02/13/2020       Cardiac testing:   No results found for this or any previous visit  No results found for this or any previous visit  No procedure found  No results found for this or any previous visit  Imaging:   I have personally reviewed pertinent reports          EKG reviewed personally:  Normal sinus rhythm with incomplete right bundle-branch block    Code Status: Level 3 - DNAR and DNI      AMNA Hubbard  2/3/2021,2:09 PM

## 2021-02-04 ENCOUNTER — APPOINTMENT (INPATIENT)
Dept: NON INVASIVE DIAGNOSTICS | Facility: HOSPITAL | Age: 86
DRG: 375 | End: 2021-02-04
Payer: COMMERCIAL

## 2021-02-04 ENCOUNTER — TELEPHONE (OUTPATIENT)
Dept: GASTROENTEROLOGY | Facility: CLINIC | Age: 86
End: 2021-02-04

## 2021-02-04 PROBLEM — G44.83 PRIMARY COUGH HEADACHE: Status: RESOLVED | Noted: 2021-02-03 | Resolved: 2021-02-04

## 2021-02-04 PROBLEM — R82.90 ABNORMAL FINDING ON URINALYSIS: Status: RESOLVED | Noted: 2021-01-28 | Resolved: 2021-02-04

## 2021-02-04 PROBLEM — E87.6 HYPOKALEMIA: Status: ACTIVE | Noted: 2021-02-04

## 2021-02-04 LAB
ALBUMIN SERPL BCP-MCNC: 1.7 G/DL (ref 3.5–5)
ALP SERPL-CCNC: 373 U/L (ref 46–116)
ALT SERPL W P-5'-P-CCNC: 522 U/L (ref 12–78)
ANION GAP SERPL CALCULATED.3IONS-SCNC: 1 MMOL/L (ref 4–13)
AST SERPL W P-5'-P-CCNC: 577 U/L (ref 5–45)
BILIRUB SERPL-MCNC: 2 MG/DL (ref 0.2–1)
BUN SERPL-MCNC: 8 MG/DL (ref 5–25)
CALCIUM ALBUM COR SERPL-MCNC: 9.6 MG/DL (ref 8.3–10.1)
CALCIUM SERPL-MCNC: 7.8 MG/DL (ref 8.3–10.1)
CHLORIDE SERPL-SCNC: 108 MMOL/L (ref 100–108)
CO2 SERPL-SCNC: 31 MMOL/L (ref 21–32)
CREAT SERPL-MCNC: 0.78 MG/DL (ref 0.6–1.3)
FLUAV RNA RESP QL NAA+PROBE: NEGATIVE
FLUBV RNA RESP QL NAA+PROBE: NEGATIVE
GFR SERPL CREATININE-BSD FRML MDRD: 67 ML/MIN/1.73SQ M
GLUCOSE SERPL-MCNC: 96 MG/DL (ref 65–140)
POTASSIUM SERPL-SCNC: 3.4 MMOL/L (ref 3.5–5.3)
PROT SERPL-MCNC: 6.6 G/DL (ref 6.4–8.2)
RSV RNA RESP QL NAA+PROBE: NEGATIVE
SARS-COV-2 RNA RESP QL NAA+PROBE: NEGATIVE
SODIUM SERPL-SCNC: 140 MMOL/L (ref 136–145)

## 2021-02-04 PROCEDURE — NC001 PR NO CHARGE: Performed by: PHYSICIAN ASSISTANT

## 2021-02-04 PROCEDURE — 94760 N-INVAS EAR/PLS OXIMETRY 1: CPT

## 2021-02-04 PROCEDURE — 99232 SBSQ HOSP IP/OBS MODERATE 35: CPT | Performed by: INTERNAL MEDICINE

## 2021-02-04 PROCEDURE — 0241U HB NFCT DS VIR RESP RNA 4 TRGT: CPT | Performed by: INTERNAL MEDICINE

## 2021-02-04 PROCEDURE — 80053 COMPREHEN METABOLIC PANEL: CPT | Performed by: INTERNAL MEDICINE

## 2021-02-04 PROCEDURE — 99233 SBSQ HOSP IP/OBS HIGH 50: CPT | Performed by: INTERNAL MEDICINE

## 2021-02-04 PROCEDURE — 94664 DEMO&/EVAL PT USE INHALER: CPT

## 2021-02-04 PROCEDURE — 97530 THERAPEUTIC ACTIVITIES: CPT

## 2021-02-04 PROCEDURE — 97535 SELF CARE MNGMENT TRAINING: CPT

## 2021-02-04 PROCEDURE — 93306 TTE W/DOPPLER COMPLETE: CPT

## 2021-02-04 PROCEDURE — 94640 AIRWAY INHALATION TREATMENT: CPT

## 2021-02-04 PROCEDURE — 93306 TTE W/DOPPLER COMPLETE: CPT | Performed by: INTERNAL MEDICINE

## 2021-02-04 RX ORDER — ALBUTEROL SULFATE 2.5 MG/3ML
2.5 SOLUTION RESPIRATORY (INHALATION) EVERY 6 HOURS PRN
Status: DISCONTINUED | OUTPATIENT
Start: 2021-02-04 | End: 2021-02-09 | Stop reason: HOSPADM

## 2021-02-04 RX ORDER — POTASSIUM CHLORIDE 20 MEQ/1
20 TABLET, EXTENDED RELEASE ORAL 2 TIMES DAILY
Status: DISCONTINUED | OUTPATIENT
Start: 2021-02-04 | End: 2021-02-09 | Stop reason: HOSPADM

## 2021-02-04 RX ADMIN — HEPARIN SODIUM 5000 UNITS: 5000 INJECTION INTRAVENOUS; SUBCUTANEOUS at 08:44

## 2021-02-04 RX ADMIN — HEPARIN SODIUM 5000 UNITS: 5000 INJECTION INTRAVENOUS; SUBCUTANEOUS at 21:11

## 2021-02-04 RX ADMIN — MIRTAZAPINE 15 MG: 15 TABLET, FILM COATED ORAL at 21:11

## 2021-02-04 RX ADMIN — ALBUTEROL SULFATE 2.5 MG: 2.5 SOLUTION RESPIRATORY (INHALATION) at 17:10

## 2021-02-04 RX ADMIN — PANTOPRAZOLE SODIUM 40 MG: 40 TABLET, DELAYED RELEASE ORAL at 05:18

## 2021-02-04 RX ADMIN — POTASSIUM CHLORIDE 20 MEQ: 1500 TABLET, EXTENDED RELEASE ORAL at 08:44

## 2021-02-04 RX ADMIN — POTASSIUM CHLORIDE 20 MEQ: 1500 TABLET, EXTENDED RELEASE ORAL at 17:58

## 2021-02-04 NOTE — CASE MANAGEMENT
Cm was informed by Nic ESPINOSA that the pt is medically stable to dc  Cm to follow up with the pt dtr, Viri Jo  [phone] about rec for STR vs the pt going home with [facility]  Cm called Viri Jo, the pt dtr and she states that spoke with the pt and now the pt is agreeable to going to STR  The pt dtr, Viri Jo states that she would like the pt to be referred to [facility]  CM informed her that a referral will be sent and also asked for additional referral resources  The pt dtr requested he name of other STR in the [facility] area  Cm provided her with the names of [place] and also [facility]  The pt dtr will check the facilities and inform the CM about her choices    Cm referred the pt to [facility]  Awaiting the return call  Regarding the other 2 referrals

## 2021-02-04 NOTE — PLAN OF CARE
Problem: OCCUPATIONAL THERAPY ADULT  Goal: Performs self-care activities at highest level of function for planned discharge setting  See evaluation for individualized goals  Description: Treatment Interventions: ADL retraining, Visual perceptual retraining, Functional transfer training, UE strengthening/ROM, Endurance training, Patient/family training, Compensatory technique education, Activityengagement          See flowsheet documentation for full assessment, interventions and recommendations  Note: Limitation: Decreased ADL status, Decreased UE strength, Decreased endurance, Decreased self-care trans, Visual deficit, Decreased high-level ADLs  Prognosis: Good  Assessment: Patient participated in Skilled OT session this date with interventions consisting of ADL re training with the use of correct body mechnaics, Energy Conservation techniques, safety awareness and fall prevention techniques,  therapeutic activities to: increase activity tolerance, increase standing tolerance time with unilateral UE support to complete sink level ADLs, increase dynamic sit/ stand balance during functional activity  and increase OOB/ sitting tolerance, and increasing functional standing tolerance  Patient agreeable to OT treatment session, upon arrival patient was found supine in bed, responsive  and in no apparent distress  In comparison to previous session, patient with improvements in task engagement  Patient participated in [address] and LB ADLs while seated and standing at sink level; please see above flowsheet for ADL assist levels  Patient demonstrated good static and fair+ dynamic sitting balance, and fair static and fair- dynamic standing balance with unilateral to no UE support on sink counter while performing functional tasks  Patient with noted fatigue and exertion while performing functional tasks seated  Patient requiring verbal cues for correct technique, frequent rest periods and ocassional safety reminders   Patient continues to be functioning below baseline level, occupational performance remains limited secondary to factors listed above and increased risk for falls and injury  From OT standpoint, recommendation at time of d/c would be post-acute rehabilitation services  Patient to benefit from continued Occupational Therapy treatment while in the hospital to address deficits as defined above and maximize level of functional independence with ADLs and functional mobility        OT Discharge Recommendation: Post-Acute Rehabilitation Services  OT - OK to Discharge: Yes(Once medically cleared )

## 2021-02-04 NOTE — PROGRESS NOTES
Progress Note  Shirley Arias Cuda 80 y o  female MRN: [id number]  Unit/Bed#: -01 Encounter: [id number]    Subjective:  Patient denies abdominal pain  No nausea or vomiting  No fevers  Objective:     Vitals:   Vitals:    02/04/21 0700   BP: 120/79   Pulse: 72   Resp: 18   Temp: 98 2 °F (36 8 °C)   SpO2: 93%   ,Body mass index is 25 02 kg/m²  Intake/Output Summary (Last 24 hours) at 2/4/2021 8726  Last data filed at 2/4/2021 0501  Gross per 24 hour   Intake 600 ml   Output 750 ml   Net -150 ml       Physical Exam:     General Appearance: Alert, appears stated age and cooperative  Lungs: Decreased breath sounds, scant wheeze  Heart: Regular rate and rhythm, S1, S2 normal, no murmur, click, rub or gallop  Abdomen: Soft, non-tender, non-distended; bowel sounds normal; no masses or no organomegaly  Extremities: No cyanosis, edema    Invasive Devices     Peripheral Intravenous Line            Peripheral IV 02/01/21 Left Antecubital 2 days                Lab Results:  No results displayed because visit has over 200 results  Imaging Studies:   I have personally reviewed pertinent imaging studies  Xr Chest Portable    Result Date: 2/1/2021  Narrative: CHEST INDICATION:   Dyspnea  COMPARISON:  CT abdomen pelvis 1/28/2021 EXAM PERFORMED/VIEWS:  XR CHEST PORTABLE FINDINGS: Cardiomediastinal silhouette appears unremarkable  Mild bibasilar atelectasis  Left hemidiaphragm is mildly elevated  No pleural effusion or pneumothorax  Osseous structures appear within normal limits for patient age  Impression: Mild bibasilar atelectasis  Workstation performed: [id number]     Ct Chest W Contrast    Result Date: 2/2/2021  Narrative: CT CHEST WITH IV CONTRAST INDICATION:   Colon cancer, non-metastatic, initial workup colon cancer  COMPARISON:  MRI from 1/29/2021; CT from 1/28/2021 TECHNIQUE: CT examination of the chest was performed   Axial, sagittal, and coronal 2D reformatted images were created from the source data and submitted for interpretation  Radiation dose length product (DLP) for this visit:  180 mGy-cm   This examination, like all CT scans performed in the [facility], was performed utilizing techniques to minimize radiation dose exposure, including the use of iterative reconstruction and automated exposure control  IV Contrast:  85 mL of iohexol (OMNIPAQUE) FINDINGS: LUNGS:  2 mm pleural-based nodule is noted in the right upper lobe on image 21  Right upper lobe 2 mm nodule image 26  One or 2 other small nodules less than 4 mm are identified  Bandlike consolidation adjacent to effusions is most likely related to compressive atelectasis  There Is no tracheal or endobronchial lesion  PLEURA:  Bilateral small pleural effusions  HEART/GREAT VESSELS:  Coronary calcification is present    MEDIASTINUM AND MANSI:  Unremarkable  CHEST WALL AND LOWER NECK:   Unremarkable  VISUALIZED STRUCTURES IN THE UPPER ABDOMEN:  Unremarkable  OSSEOUS STRUCTURES:  There is mild wedging of T8 and T9 T9 is unchanged as compared to the abdomen CT  Mild wedging of T5 is also identified  Impression: Small effusions  A few small nodules are identified  Based on current Fleischner Society 2017 Guidelines on incidental pulmonary nodule, patients with a known malignancy are at increased risk of metastasis and should receive initial three month follow-up chest CT  Workstation performed: [id number]     Nm Hepatobiliary W Rx    Result Date: 1/31/2021  Narrative: HEPATOBILIARY SCAN INDICATION:  Abnormal CT and MRI appearance of the gallbladder  Further evaluation for cholecystitis COMPARISON:  CT 1/28/2021, MRI 1/29/2021 TECHNIQUE:   Following the intravenous administration of 4 5 mCi Tc-99m labeled mebrofenin, dynamic abdominal images were obtained over a 60 minute time period  Images were performed in AP projection  FINDINGS: There is normal visualization of the liver parenchyma, common bile duct and small bowel    Initially, the gallbladder did not visualize  Therefore, 2 0 mg of morphine was administered intravenously x1 in the nuclear medicine department  Following morphine administration, prompt visualization of the gallbladder occurred     Impression: Following administration of IV morphine, prompt visualization of the gallbladder, indicating a patent cystic duct  No scintigraphic evidence of acute cholecystitis Workstation performed: [id number]     Mri Abdomen W Wo Contrast And Mrcp    Result Date: 1/30/2021  Narrative: MRI OF THE ABDOMEN WITH AND WITHOUT CONTRAST WITH MRCP INDICATION:  Assess CBD with possible a calculus cholecystitis on CT scan  COMPARISON: CT scan from yesterday  TECHNIQUE:  The following pulse sequences were obtained:  Coronal and axial T2 with TE of 90 and 180 respectively, axial T2 with fat saturation, axial FIESTA fat-sat, axial T1-weighted in-and-out-of phase, axial DWI/ADC, pre-contrast axial T1 with fat saturation, post-contrast dynamic axial T1 with fat saturation at 20, 70, and 180 seconds, followed by coronal and 7 minute delayed axial T1 with fat saturation  3D MRCP images were obtained with radial thick slabs and projections  3D rendering was performed from the acquisition scanner  IV Contrast:  5 mL of Gadobutrol injection (SINGLE-DOSE) FINDINGS: LOWER CHEST:   Unremarkable  LIVER: Normal in size and configuration  No suspicious mass  The hepatic veins and portal veins are patent  BILE DUCTS:  No intrahepatic or extrahepatic bile duct dilation  Common bile duct is normal in caliber  No choledocholithiasis, biliary stricture or suspicious mass  GALLBLADDER:  Gallbladder wall thickening  No stones  PANCREAS:  Normal  No main pancreatic ductal dilation  ADRENAL GLANDS:  Normal  SPLEEN:  Normal  KIDNEYS/PROXIMAL URETERS:  No hydroureteronephrosis  No suspicious renal mass   BOWEL:   Enhancing focus in the ascending colon reidentified suspicious for neoplasm measuring approximately 3 5 cm on image 14/323  PERITONEUM/RETROPERITONEUM:  No ascites  LYMPH NODES:  Upper abdominal adenopathy including a portacaval node measuring 1 2 cm  VASCULAR STRUCTURES:  No aneurysm  ABDOMINAL WALL:  Unremarkable  OSSEOUS STRUCTURES:  No suspicious osseous lesion  Impression: Gallbladder has an abnormal appearance with wall thickening and/or pericholecystic fluid  No stones identified  Possibility is raised of acalculous cholecystitis  CBD is normal in diameter  There is mild nonspecific enhancement of the distal CBD which may represent mild cholangitis  Upper abdominal portacaval lymphadenopathy measuring 1 2 cm  Enhancing focus in the ascending colon wall reidentified suspicious for neoplasm measuring approximately 3 5 cm on image 14/323  Workstation performed: [id number]     Ct Abdomen Pelvis With Contrast    Result Date: 1/28/2021  Narrative: CT ABDOMEN AND PELVIS WITH IV CONTRAST INDICATION:   Abdominal pain, acute, nonlocalized pain,l transaminitis  COMPARISON:  None  TECHNIQUE:  CT examination of the abdomen and pelvis was performed  Axial, sagittal, and coronal 2D reformatted images were created from the source data and submitted for interpretation  Radiation dose length product (DLP) for this visit:  538 65 mGy-cm   This examination, like all CT scans performed in the [facility], was performed utilizing techniques to minimize radiation dose exposure, including the use of iterative  reconstruction and automated exposure control  IV Contrast:  100 mL of iohexol (OMNIPAQUE) Enteric Contrast:  Enteric contrast was not administered  FINDINGS: ABDOMEN LOWER CHEST:  No clinically significant abnormality identified in the visualized lower chest  LIVER/BILIARY TREE:  Mild intrahepatic ductal dilatation  Extrahepatic CBD with mild mucosal enhancement  GALLBLADDER:  No calcified stones identified  Minimal pericholecystic fluid  SPLEEN:  Unremarkable  PANCREAS:  Unremarkable   ADRENAL GLANDS: Unremarkable  KIDNEYS/URETERS:  Unremarkable  No hydronephrosis  STOMACH AND BOWEL:  Colonic diverticulosis  Soft tissue mass in the ascending colon just above the ileocecal valve consistent with neoplasm measuring 3 9 x 2 1 cm  Multiple small mesenteric nodes are noted adjacent to the mass seen on image 601/65  APPENDIX:  No findings to suggest appendicitis  ABDOMINOPELVIC CAVITY:  No ascites  No pneumoperitoneum  Small mesenteric nodes in the right lower quadrant adjacent to the colon mass  VESSELS:  Unremarkable for patient's age  PELVIS REPRODUCTIVE ORGANS:  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  URINARY BLADDER:  Unremarkable  ABDOMINAL WALL/INGUINAL REGIONS:  Unremarkable  OSSEOUS STRUCTURES:  No acute fracture or destructive osseous lesion  Advanced degenerative changes of the lumbar spine  Impression: Soft tissue mass in the ascending colon just above the ileocecal valve consistent with neoplasm measuring 3 9 x 2 1 cm  Multiple small mesenteric nodes are noted adjacent to the mass seen on image 601/65  Recommend colonoscopy  Pericholecystic fluid  No calcified gallstones  Possible acalculous cholecystitis  Correlate clinically  Consider ultrasound if any clinical ambiguity  Mild nonspecific enhancement of the extrahepatic CBD  This can be seen in the setting of cholangitis  The study was marked in [facility] for immediate notification  Workstation performed: [id number]         Assessment & Plan    Elevated LFTs     - Elevated LFTs are suspected to be secondary to chronic Tylenol toxicity  - She is s/p a NAC course  - MRI/MRCP did not show CBD dilation, no evidence of a stricture/stone   - HIDA Scan was negative  - Patient did not meet criteria for cholangitis and Zosyn was discontinued 2/1  No indication for ERCP  - Acute hepatitis panel was negative  Monospot was negative  Smooth muscle ab negative  Follow up AMA    - LFTs are continuing to trend down overall for AST, ALT, and TB - ALP trended up mildly today  - Recommend monitoring LFTs closely as an outpatient to ensure continued improvement      Colonic Mass     - CT on admission noted a soft tissue mass in the ascending colon just above the ileocecal valve consistent with possible neoplasm   - Colonoscopy yesterday showed a large mass of the ascending colon covering 1/3 the circumference  - CEA is elevated  CT of the chest showed a few small lung nodules  - Follow up path  - Surgery consult appreciated  Plan for surgery as an outpatient after optimization  GI will sign off and follow up with patient in the office as an outpatient in 2-3 weeks  The patient will be seen by Dr Freida Car PA-C  2/4/2021,8:42 AM

## 2021-02-04 NOTE — OCCUPATIONAL THERAPY NOTE
Occupational Therapy Treatment Note        Patient Name: Marv Moscoso  TCHCY'N Date: 2/4/2021 02/04/21 1358   OT Last Visit   OT Visit Date 02/04/21   Note Type   Note Type Treatment   Restrictions/Precautions   Weight Bearing Precautions Per Order No   Braces or Orthoses Other (Comment)  (none at baseline)   Other Precautions Visual impairment; Chair Alarm; Bed Alarm; Fall Risk   Pain Assessment   Pain Assessment Tool 0-10   Pain Score No Pain   ADL   Eating Assistance 5  Supervision/Setup   Eating Deficit Increased time to complete;Setup   Grooming Assistance 5  Supervision/Setup   Grooming Deficit Setup; Increased time to complete;Supervision/safety; Teeth care   UB Bathing Assistance 5  Supervision/Setup   UB Bathing Deficit Setup; Increased time to complete   LB Bathing Assistance 4  Minimal Assistance   LB Bathing Deficit Setup;Supervision/safety; Increased time to complete;Right lower leg including foot; Left lower leg including foot   UB Dressing Assistance 5  Supervision/Setup   UB Dressing Deficit Setup;Supervision/safety; Increased time to complete   LB Dressing Assistance 4  Minimal Assistance   LB Dressing Deficit Setup;Steadying;Verbal cueing;Supervision/safety; Increased time to complete   Toileting Assistance  5  Supervision/Setup   Functional Standing Tolerance   Time ~2-3 minute time intervals x3   Activity Functional transfers, mobility, LB bathing/patricia care, and grooming task at sink   Comments Performed transfers/mobility w/ RW, unilateral to no UE support on sink during LB bathing/grooming   Bed Mobility   Supine to Sit 4  Minimal assistance   Additional items Assist x 1;HOB elevated; Increased time required;Verbal cues   Additional Comments Pt received lying supine in bed upon OT arrival; at end of session: pt seated OOB to recliner chair w/ all needs within reach and chair alarm activated   Transfers   Sit to Stand 4  Minimal assistance  (CGA)   Additional items Assist x 1; Increased time required;Verbal cues   Stand to Sit 4  Minimal assistance  (CGA)   Additional items Assist x 1; Increased time required;Verbal cues;Armrests   Toilet transfer 4  Minimal assistance  (CGA)   Additional items Assist x 1; Armrests; Increased time required;Verbal cues; Commode   Additional Comments Performed functional transfers with RW   Functional Mobility   Functional Mobility 4  Minimal assistance  (CGA)   Additional Comments x1; pt ambulated to bathroom and back with no noted LOB   Additional items Rolling walker   Toilet Transfers   Toilet Transfer From Bed   Toilet Transfer Type To   Toilet Transfer to   (Commode in bathroom)   Toilet Transfer Technique Ambulating   Toilet Transfers Contact guard   Cognition   Overall Cognitive Status WFL   Arousal/Participation Alert; Responsive; Cooperative   Attention Within functional limits   Orientation Level Oriented X4   Memory Within functional limits   Following Commands Follows all commands and directions without difficulty   Comments Patient agreeable to OT treatment   Activity Tolerance   Activity Tolerance Patient limited by fatigue   Medical Staff Made Aware PRICE Marinelli confirmed pt appropriate for therapy and made aware of session outcomes   Assessment   Assessment Patient participated in Skilled OT session this date with interventions consisting of ADL re training with the use of correct body mechnaics, Energy Conservation techniques, safety awareness and fall prevention techniques,  therapeutic activities to: increase activity tolerance, increase standing tolerance time with unilateral UE support to complete sink level ADLs, increase dynamic sit/ stand balance during functional activity  and increase OOB/ sitting tolerance, and increasing functional standing tolerance  Patient agreeable to OT treatment session, upon arrival patient was found supine in bed, responsive  and in no apparent distress    In comparison to previous session, patient with improvements in task engagement  Patient participated in [address] and LB ADLs while seated and standing at sink level; please see above flowsheet for ADL assist levels  Patient demonstrated good static and fair+ dynamic sitting balance, and fair static and fair- dynamic standing balance with unilateral to no UE support on sink counter while performing functional tasks  Patient with noted fatigue and exertion while performing functional tasks seated  Patient requiring verbal cues for correct technique, frequent rest periods and ocassional safety reminders  Patient continues to be functioning below baseline level, occupational performance remains limited secondary to factors listed above and increased risk for falls and injury  From OT standpoint, recommendation at time of d/c would be post-acute rehabilitation services  Patient to benefit from continued Occupational Therapy treatment while in the hospital to address deficits as defined above and maximize level of functional independence with ADLs and functional mobility  Plan   Treatment Interventions ADL retraining;Visual perceptual retraining;Functional transfer training;UE strengthening/ROM; Endurance training;Patient/family training; Compensatory technique education; Activityengagement   Goal Expiration Date 02/08/21   OT Treatment Day 1   OT Frequency 3-5x/wk   Recommendation   OT Discharge Recommendation Post-Acute Rehabilitation Services   AM-PAC Daily Activity Inpatient   Lower Body Dressing 3   Bathing 3   Toileting 3   Upper Body Dressing 3   Grooming 3   Eating 4   Daily Activity Raw Score 19   Daily Activity Standardized Score (Calc for Raw Score >=11) 40 22   AM-PAC Applied Cognition Inpatient   Following a Speech/Presentation 4   Understanding Ordinary Conversation 4   Taking Medications 3   Remembering Where Things Are Placed or Put Away 3   Remembering List of 4-5 Errands 3   Taking Care of Complicated Tasks 3   Applied Cognition Raw Score 20   Applied Cognition Standardized Score 41 76   Modified [place] Scale   Modified [place] Scale 4     Zofia Jones, OTR/L

## 2021-02-04 NOTE — CASE MANAGEMENT
[facility] is willi to accept the pt  [address] who is the family first choice is reviewing her referral and has asked for a COVID test   Anthony has relayed that message to [facility]  CM called the pt dtr Dana Kong and left a VM for a return call  Cm updated her about [facility] accepting and [facility] reviewing      CM to follow up in the AM   The pt will need auth for placement

## 2021-02-04 NOTE — PROGRESS NOTES
Cardiology Progress Note   Joshua Cao Cuda 80 y o  female MRN: [id number]    Unit/Bed#: -01 Encounter: [id number]      Assessment/Plan:  1  Preoperative cardiac risk assessment - patient is tentatively scheduled for surgical intervention of large proximal ascending colonic mass in 2 months  Patient to be discharged to acute rehab facility for 2 weeks post discharge  She reports good exertional capacity and is able to perform at least 4 Mets of activity without having chest pain or shortness of breath  She denies prior history of cardiac illness  She is RCRI 0 9%  TTE is currently pending to assess LVEF and regional wall motion abnormalities, if normal, no further cardiac testing is warranted at this time  2  Hyperlipidemia - patient's home atorvastatin is on hold currently given elevated LFTs  3  Large proximal ascending colonic mass s/p biopsy on 02/01/2021 - management per surgical team     Subjective:   Patient seen and examined  She is in good spirits today  She denies any active cardiac complaints such as chest pain, discomfort, shortness of breath, orthopnea, PND lower extremity edema  Patient is to be discharged to acute rehab  Scheduled for surgery in 2 months for colonic mass resection  Objective:     Vitals: Blood pressure 138/64, pulse 79, temperature 98 °F (36 7 °C), resp   rate 17, height 4' 11" (1 499 m), weight 56 2 kg (123 lb 14 4 oz), SpO2 93 %, not currently breastfeeding , Body mass index is 25 02 kg/m² ,   Orthostatic Blood Pressures      Most Recent Value   Blood Pressure  138/64 filed at 02/04/2021 0844   Patient Position - Orthostatic VS  Lying filed at 02/03/2021 2300            Intake/Output Summary (Last 24 hours) at 2/4/2021 0912  Last data filed at 2/4/2021 0844  Gross per 24 hour   Intake 600 ml   Output 750 ml   Net -150 ml         Physical Exam:    GEN: Ekta Marquez appears well, alert and oriented x 3, pleasant and cooperative   HEENT: pupils equal, round, and reactive to light; extraocular muscles intact  NECK: supple, no carotid bruits   HEART: regular rhythm, normal S1 and S2, no murmurs, clicks, gallops or rubs   LUNGS: clear to auscultation bilaterally; no wheezes, rales, or rhonchi   ABDOMEN: normal bowel sounds, soft, no tenderness, no distention  EXTREMITIES: peripheral pulses normal; no clubbing, cyanosis, or edema      Medications:      Current Facility-Administered Medications:     Brinzolamide-Brimonidine 1-0 2 % SUSP 1 drop, 1 drop, Both Eyes, BID, Valri Paniagua, DO, 1 drop at 02/04/21 0844    heparin (porcine) subcutaneous injection 5,000 Units, 5,000 Units, Subcutaneous, Q12H [address], 5,000 Units at 02/04/21 0844 **AND** [CANCELED] Platelet count, , , Once, Rosalie Serrano MD    ibuprofen (MOTRIN) tablet 400 mg, 400 mg, Oral, Q6H PRN, Jasmin Gaines MD    Latanoprostene Bunod 0 024 % SOLN 1 drop, 1 drop, Both Eyes, HS, Valri Siva, DO, 1 drop at 02/03/21 2152    mirtazapine (REMERON) tablet 15 mg, 15 mg, Oral, HS, Jasmin Gaines MD, 15 mg at 02/03/21 2211    Netarsudil Dimesylate 0 02 % SOLN 1 drop, 1 drop, Both Eyes, HS, Anthony Paniagua DO, 1 drop at 02/03/21 2212    ondansetron (ZOFRAN) injection 4 mg, 4 mg, Intravenous, Q6H PRN, Anthony Paniagua DO    pantoprazole (PROTONIX) EC tablet 40 mg, 40 mg, Oral, Early Morning, Jasmin Gaines MD, 40 mg at 02/04/21 0518    potassium chloride (K-DUR,KLOR-CON) CR tablet 20 mEq, 20 mEq, Oral, BID, Jasmin Gaines MD, 20 mEq at 02/04/21 0844     Labs & Results:    Results from last 7 days   Lab Units 01/28/21  1759   TROPONIN I ng/mL <0 02     Results from last 7 days   Lab Units 02/03/21  0450 02/02/21  0532 02/01/21  0513   WBC Thousand/uL 6 41 7 18 7 04   HEMOGLOBIN g/dL 11 2* 12 2 11 5   HEMATOCRIT % 33 3* 37 2 34 3*   PLATELETS Thousands/uL 274 263 229     Results from last 7 days   Lab Units 01/28/21  0933   TRIGLYCERIDES mg/dL 205*   HDL mg/dL 9*     Results from last 7 days   Lab Units 02/04/21  0507 02/03/21  0450 02/02/21  0532   POTASSIUM mmol/L 3 4* 3 5 4 4   CHLORIDE mmol/L 108 106 106   CO2 mmol/L 31 28 28   BUN mg/dL 8 9 7   CREATININE mg/dL 0 78 0 77 0 70   CALCIUM mg/dL 7 8* 7 9* 8 4   ALK PHOS U/L 373* 371* 356*   ALT U/L 522* 567* 663*   AST U/L 577* 598* 662*     Results from last 7 days   Lab Units 02/01/21  0513 01/30/21  0502 01/29/21  0451 01/28/21  1759   INR  1 15 1 34* 1 18 1 13   PTT seconds  --   --   --  29     Results from last 7 days   Lab Units 01/29/21  0451   MAGNESIUM mg/dL 1 9

## 2021-02-04 NOTE — TELEPHONE ENCOUNTER
----- Message from Roddy Esparza PA-C sent at 2/4/2021  8:47 AM EST -----  Patient needs a follow up from the hospital in 2-3 weeks

## 2021-02-04 NOTE — PROGRESS NOTES
Progress Note - Farheen Smalls 80 y o  female MRN: [id number]  Unit/Bed#: -01 Encounter: [id number]    Subjective:     Alhaji Peraza is feeling generally well this morning though a bit overwhelmed by the thought of undergoing major abdominal surgery  Cough has improved  She denies chest pain or shortness of breath  She denies fevers or chills  Abdominal discomfort has improved  Headache has improved  All other ROS are negative  Objective:   Vitals: Blood pressure 120/59, pulse 70, temperature 98 5 °F (36 9 °C), temperature source Oral, resp  rate 18, height 4' 11" (1 499 m), weight 56 2 kg (123 lb 14 4 oz), SpO2 94 %, not currently breastfeeding  ,Body mass index is 25 02 kg/m²  SPO2 RA Rest      ED to Hosp-Admission (Current) from 1/28/2021 in [address] [place] [address] 4th Floor Med Surg Unit   SpO2  94 %   SpO2 Activity  At Rest   O2 Device  None (Room air)   O2 Flow Rate  6 L/min        I&O:     Intake/Output Summary (Last 24 hours) at 2/4/2021 1615  Last data filed at 2/4/2021 0844  Gross per 24 hour   Intake 600 ml   Output 750 ml   Net -150 ml         Physical Exam:       General Appearance:    Alert, cooperative, no distress   Head:    Normocephalic, without obvious abnormality, atraumatic   Eyes:    PERRL, conjunctiva/corneas clear, EOM's intact       Nose:   Moist mucous membranes, no drainage or sinus tenderness   Throat:   No tenderness, no exudates   Neck:   Supple, symmetrical, trachea midline, no JVD   Lungs:     Clear to auscultation bilaterally, respirations unlabored   Heart:    Regular rate and rhythm, S1 and S2 normal, no murmur, rub   or gallop   Abdomen: Soft, non-tender, positive bowel sounds, no masses, no organomegaly   Extremities:  No pedal edema, calf tenderness  Distal pulses palpable  Neurologic:     CNII-XII intact        Invasive Devices     Peripheral Intravenous Line            Peripheral IV 02/01/21 Left Antecubital 3 days                      Social History reviewed  Family History   Problem Relation Age of Onset    Diabetes type II Mother     Pancreatic cancer Father     Colon cancer Sister     reviewed    Meds:  Current Facility-Administered Medications   Medication Dose Route Frequency Provider Last Rate Last Admin    albuterol inhalation solution 2 5 mg  2 5 mg Nebulization Q6H PRN Jasmin Gaines MD        Brinzolamide-Brimonidine 1-0 2 % SUSP 1 drop  1 drop Both Eyes BID Valri Paniagua, DO   1 drop at 02/04/21 0844    heparin (porcine) subcutaneous injection 5,000 Units  5,000 Units Subcutaneous Q12H [address], DO   5,000 Units at 02/04/21 0844    ibuprofen (MOTRIN) tablet 400 mg  400 mg Oral Q6H PRN Jasmin Gaines MD        Latanoprostene Bunod 0 024 % SOLN 1 drop  1 drop Both Eyes HS Valri Paniagua, DO   1 drop at 02/03/21 2152    mirtazapine (REMERON) tablet 15 mg  15 mg Oral HS Jasmin Gaines MD   15 mg at 02/03/21 2211    Netarsudil Dimesylate 0 02 % SOLN 1 drop  1 drop Both Eyes HS Valri Paniagua, DO   1 drop at 02/03/21 2212    ondansetron (ZOFRAN) injection 4 mg  4 mg Intravenous Q6H PRN Valri Paniagua, DO        pantoprazole (PROTONIX) EC tablet 40 mg  40 mg Oral Early Morning Jasmin Gaines MD   40 mg at 02/04/21 0518    potassium chloride (K-DUR,KLOR-CON) CR tablet 20 mEq  20 mEq Oral BID Jasmin Gaines MD   20 mEq at 02/04/21 0844      Medications Prior to Admission   Medication    acetaminophen (TYLENOL) 500 mg tablet    atorvastatin (LIPITOR) 20 mg tablet    cetirizine (ZyrTEC) 10 mg tablet    Cholecalciferol ([employer] VITAMIN D) 2000 units CAPS    mirtazapine (REMERON) 7 5 MG tablet    Multiple Vitamins-Minerals (CENTRUM SILVER 50+WOMEN) TABS    RHOPRESSA 0 02 % SOLN    SIMBRINZA 1-0 2 % SUSP    VYZULTA 0 024 % SOLN       Labs:  Results from last 7 days   Lab Units 02/03/21  0450 02/02/21  0532 02/01/21  0513   WBC Thousand/uL 6 41 7 18 7 04   HEMOGLOBIN g/dL 11 2* 12 2 11 5   HEMATOCRIT % 33 3* 37 2 34 3*   PLATELETS Thousands/uL 274 263 229   NEUTROS PCT % 51 54 56   LYMPHS PCT % 27 27 24   MONOS PCT % 14* 12 12   EOS PCT % 6 5 6     Results from last 7 days   Lab Units 02/04/21  0507 02/03/21  0450 02/02/21  0532   POTASSIUM mmol/L 3 4* 3 5 4 4   CHLORIDE mmol/L 108 106 106   CO2 mmol/L 31 28 28   BUN mg/dL 8 9 7   CREATININE mg/dL 0 78 0 77 0 70   CALCIUM mg/dL 7 8* 7 9* 8 4   ALK PHOS U/L 373* 371* 356*   ALT U/L 522* 567* 663*   AST U/L 577* 598* 662*     Lab Results   Component Value Date    TROPONINI <0 02 01/28/2021     Results from last 7 days   Lab Units 02/01/21  0513 01/30/21  0502 01/29/21  0451   INR  1 15 1 34* 1 18     Lab Results   Component Value Date    URINECX <10,000 cfu/ml  10/28/2019       Imaging:  Results for orders placed during the hospital encounter of 01/28/21   XR chest portable    Narrative CHEST     INDICATION:   Dyspnea  COMPARISON:  CT abdomen pelvis 1/28/2021    EXAM PERFORMED/VIEWS:  XR CHEST PORTABLE      FINDINGS:    Cardiomediastinal silhouette appears unremarkable  Mild bibasilar atelectasis  Left hemidiaphragm is mildly elevated  No pleural effusion or pneumothorax  Osseous structures appear within normal limits for patient age  Impression Mild bibasilar atelectasis  Workstation performed: [id number]       No results found for this or any previous visit      VTE Pharmacologic Prophylaxis: Enoxaparin      Code Status:   Level 3 - DNAR and DNI    Assessment:  Principal Problem:    Acalculous cholecystitis  Active Problems:    Esophageal reflux    Hyperlipidemia    Macular degeneration    Colonic mass    Elevated blood pressure reading    Anxiety    Transaminitis    Shortness of breath    Cough    Hypokalemia  Resolved Problems:    Abnormal finding on urinalysis    Primary cough headache      Plan:    Acute hepatitis resume secondary to chronic Tylenol overdose status post and acetylcysteine -transaminases and bilirubin continue to trend downward, alk-phos continues to rise  Continue to follow transaminases  Colon mass presumed malignant -biopsy still pending  CEA is mildly elevated  Patient was evaluated by surgery who will follow patient as an outpatient    Blood pressure stable without medication     Hyperlipidemia -statin on hold    Hypokalemia-replete orally    Cough and dyspnea have improved, no infiltrate on CT  Continue albuterol as needed  Disposition -hopeful for discharge to skilled nursing facility in [facility] Discussed with daughter Philipp Fernandes at length by phone      Sangeetha Fraser MD  5/1/3609,7:16 PM

## 2021-02-05 LAB
ALBUMIN SERPL BCP-MCNC: 1.7 G/DL (ref 3.5–5)
ALP SERPL-CCNC: 403 U/L (ref 46–116)
ALT SERPL W P-5'-P-CCNC: 509 U/L (ref 12–78)
ANION GAP SERPL CALCULATED.3IONS-SCNC: 0 MMOL/L (ref 4–13)
AST SERPL W P-5'-P-CCNC: 605 U/L (ref 5–45)
BILIRUB SERPL-MCNC: 2 MG/DL (ref 0.2–1)
BUN SERPL-MCNC: 10 MG/DL (ref 5–25)
CALCIUM ALBUM COR SERPL-MCNC: 10.2 MG/DL (ref 8.3–10.1)
CALCIUM SERPL-MCNC: 8.4 MG/DL (ref 8.3–10.1)
CHLORIDE SERPL-SCNC: 106 MMOL/L (ref 100–108)
CO2 SERPL-SCNC: 31 MMOL/L (ref 21–32)
CREAT SERPL-MCNC: 0.71 MG/DL (ref 0.6–1.3)
GFR SERPL CREATININE-BSD FRML MDRD: 75 ML/MIN/1.73SQ M
GLUCOSE SERPL-MCNC: 99 MG/DL (ref 65–140)
MAGNESIUM SERPL-MCNC: 2 MG/DL (ref 1.6–2.6)
POTASSIUM SERPL-SCNC: 4.4 MMOL/L (ref 3.5–5.3)
PROT SERPL-MCNC: 7 G/DL (ref 6.4–8.2)
SODIUM SERPL-SCNC: 137 MMOL/L (ref 136–145)

## 2021-02-05 PROCEDURE — 99232 SBSQ HOSP IP/OBS MODERATE 35: CPT | Performed by: INTERNAL MEDICINE

## 2021-02-05 PROCEDURE — 80053 COMPREHEN METABOLIC PANEL: CPT | Performed by: INTERNAL MEDICINE

## 2021-02-05 PROCEDURE — 83735 ASSAY OF MAGNESIUM: CPT | Performed by: INTERNAL MEDICINE

## 2021-02-05 RX ADMIN — POTASSIUM CHLORIDE 20 MEQ: 1500 TABLET, EXTENDED RELEASE ORAL at 09:05

## 2021-02-05 RX ADMIN — PANTOPRAZOLE SODIUM 40 MG: 40 TABLET, DELAYED RELEASE ORAL at 05:25

## 2021-02-05 RX ADMIN — HEPARIN SODIUM 5000 UNITS: 5000 INJECTION INTRAVENOUS; SUBCUTANEOUS at 09:05

## 2021-02-05 RX ADMIN — MIRTAZAPINE 15 MG: 15 TABLET, FILM COATED ORAL at 21:44

## 2021-02-05 RX ADMIN — HEPARIN SODIUM 5000 UNITS: 5000 INJECTION INTRAVENOUS; SUBCUTANEOUS at 21:44

## 2021-02-05 NOTE — PLAN OF CARE
Problem: Potential for Falls  Goal: Patient will remain free of falls  Description: INTERVENTIONS:  - Assess patient frequently for physical needs  -  Identify cognitive and physical deficits and behaviors that affect risk of falls    -  [place] fall precautions as indicated by assessment   - Educate patient/family on patient safety including physical limitations  - Instruct patient to call for assistance with activity based on assessment  - Modify environment to reduce risk of injury  - Consider OT/PT consult to assist with strengthening/mobility  Outcome: Progressing     Problem: PAIN - ADULT  Goal: Verbalizes/displays adequate comfort level or baseline comfort level  Description: Interventions:  - Encourage patient to monitor pain and request assistance  - Assess pain using appropriate pain scale  - Administer analgesics based on type and severity of pain and evaluate response  - Implement non-pharmacological measures as appropriate and evaluate response  - Consider cultural and social influences on pain and pain management  - Notify physician/advanced practitioner if interventions unsuccessful or patient reports new pain  Outcome: Progressing     Problem: INFECTION - ADULT  Goal: Absence or prevention of progression during hospitalization  Description: INTERVENTIONS:  - Assess and monitor for signs and symptoms of infection  - Monitor lab/diagnostic results  - Monitor all insertion sites, i e  indwelling lines, tubes, and drains  - Monitor endotracheal if appropriate and nasal secretions for changes in amount and color  - [place] appropriate cooling/warming therapies per order  - Administer medications as ordered  - Instruct and encourage patient and family to use good hand hygiene technique  - Identify and instruct in appropriate isolation precautions for identified infection/condition  Outcome: Progressing  Goal: Absence of fever/infection during neutropenic period  Description: INTERVENTIONS:  - Monitor WBC    Outcome: Progressing     Problem: SAFETY ADULT  Goal: Patient will remain free of falls  Description: INTERVENTIONS:  - Assess patient frequently for physical needs  -  Identify cognitive and physical deficits and behaviors that affect risk of falls    -  [place] fall precautions as indicated by assessment   - Educate patient/family on patient safety including physical limitations  - Instruct patient to call for assistance with activity based on assessment  - Modify environment to reduce risk of injury  - Consider OT/PT consult to assist with strengthening/mobility  Outcome: Progressing  Goal: Maintain or return to baseline ADL function  Description: INTERVENTIONS:  -  Assess patient's ability to carry out ADLs; assess patient's baseline for ADL function and identify physical deficits which impact ability to perform ADLs (bathing, care of mouth/teeth, toileting, grooming, dressing, etc )  - Assess/evaluate cause of self-care deficits   - Assess range of motion  - Assess patient's mobility; develop plan if impaired  - Assess patient's need for assistive devices and provide as appropriate  - Encourage maximum independence but intervene and supervise when necessary  - Involve family in performance of ADLs  - Assess for home care needs following discharge   - Consider OT consult to assist with ADL evaluation and planning for discharge  - Provide patient education as appropriate  Outcome: Progressing  Goal: Maintain or return mobility status to optimal level  Description: INTERVENTIONS:  - Assess patient's baseline mobility status (ambulation, transfers, stairs, etc )    - Identify cognitive and physical deficits and behaviors that affect mobility  - Identify mobility aids required to assist with transfers and/or ambulation (gait belt, sit-to-stand, lift, walker, cane, etc )  - [place] fall precautions as indicated by assessment  - Record patient progress and toleration of activity level on Mobility SBAR; progress patient to next Phase/Stage  - Instruct patient to call for assistance with activity based on assessment  - Consider rehabilitation consult to assist with strengthening/weightbearing, etc   Outcome: Progressing     Problem: DISCHARGE PLANNING  Goal: Discharge to home or other facility with appropriate resources  Description: INTERVENTIONS:  - Identify barriers to discharge w/patient and caregiver  - Arrange for needed discharge resources and transportation as appropriate  - Identify discharge learning needs (meds, wound care, etc )  - Arrange for interpretive services to assist at discharge as needed  - Refer to Case Management Department for coordinating discharge planning if the patient needs post-hospital services based on physician/advanced practitioner order or complex needs related to functional status, cognitive ability, or social support system  Outcome: Progressing     Problem: Knowledge Deficit  Goal: Patient/family/caregiver demonstrates understanding of disease process, treatment plan, medications, and discharge instructions  Description: Complete learning assessment and assess knowledge base    Interventions:  - Provide teaching at level of understanding  - Provide teaching via preferred learning methods  Outcome: Progressing

## 2021-02-05 NOTE — CASE MANAGEMENT
Cm reviewed all scripts and the pt has been accepted at [facility]  They have begun the auth and they will update the CM once the auth is approved    CM called the pt dtr, [facility] and updated her that once the Sofiya Ellsworth is complete that the pt will be transported to the facility    The pt dtr inquired about the waiver program and CM informed her that she would refer the family to  [facility] to assist with the waiver process  Cm also update her about the  auth for the pt

## 2021-02-05 NOTE — PROGRESS NOTES
Progress Note - Michael Lot Cuda 80 y o  female MRN: [id number]  Unit/Bed#: -01 Encounter: [id number]    Subjective:    Fredrick Hammond is feeling generally well  Still with occasional cough and chest congestion  She has minimal abdominal discomfort  Appetite is good  She has no nausea or vomiting  All other ROS are negative  Objective:   Vitals: Blood pressure 134/63, pulse 69, temperature 98 5 °F (36 9 °C), resp  rate 18, height 4' 11" (1 499 m), weight 56 2 kg (123 lb 14 4 oz), SpO2 93 %, not currently breastfeeding  ,Body mass index is 25 02 kg/m²  SPO2 RA Rest      ED to Hosp-Admission (Current) from 1/28/2021 in [facility] 4th Floor Med Surg Unit   SpO2  93 %   SpO2 Activity  At Rest   O2 Device  None (Room air)   O2 Flow Rate  6 L/min        I&O:     Intake/Output Summary (Last 24 hours) at 2/5/2021 1714  Last data filed at 2/5/2021 0854  Gross per 24 hour   Intake 360 ml   Output 700 ml   Net -340 ml         Physical Exam:       General Appearance:    Alert, cooperative, no distress   Head:    Normocephalic, without obvious abnormality, atraumatic   Eyes:    PERRL, conjunctiva/corneas clear, EOM's intact       Nose:   Moist mucous membranes, no drainage or sinus tenderness   Throat:   No tenderness, no exudates   Neck:   Supple, symmetrical, trachea midline, no JVD   Lungs:     Clear to auscultation bilaterally, respirations unlabored   Heart:    Regular rate and rhythm, S1 and S2 normal, no murmur, rub   or gallop   Abdomen: Soft,   Diffusely tender to palpate, positive bowel sounds, no masses, no organomegaly   Extremities:  No pedal edema, calf tenderness  Distal pulses palpable  Neurologic:     CNII-XII intact        Invasive Devices     None                       Social History  reviewed  Family History   Problem Relation Age of Onset    Diabetes type II Mother     Pancreatic cancer Father     Colon cancer Sister     reviewed    Meds:  Current Facility-Administered Medications Medication Dose Route Frequency Provider Last Rate Last Admin    albuterol inhalation solution 2 5 mg  2 5 mg Nebulization Q6H PRN Vicki Porter MD   2 5 mg at 02/04/21 1710    Brinzolamide-Brimonidine 1-0 2 % SUSP 1 drop  1 drop Both Eyes BID Lars Sa, DO   1 drop at 02/05/21 0906    heparin (porcine) subcutaneous injection 5,000 Units  5,000 Units Subcutaneous Q12H [address] Lars Sa, DO   5,000 Units at 02/05/21 1554    ibuprofen (MOTRIN) tablet 400 mg  400 mg Oral Q6H PRN Vicki Porter MD        Latanoprostene Bunod 0 024 % SOLN 1 drop  1 drop Both Eyes HS Lars Sa, DO   1 drop at 02/04/21 2111    mirtazapine (REMERON) tablet 15 mg  15 mg Oral HS Vicki Porter MD   15 mg at 02/04/21 2111    Netarsudil Dimesylate 0 02 % SOLN 1 drop  1 drop Both Eyes HS Lars Sa, DO   1 drop at 02/04/21 2111    ondansetron (ZOFRAN) injection 4 mg  4 mg Intravenous Q6H PRN Lars Sa, DO        pantoprazole (PROTONIX) EC tablet 40 mg  40 mg Oral Early Morning Vicki Porter MD   40 mg at 02/05/21 0525    potassium chloride (K-DUR,KLOR-CON) CR tablet 20 mEq  20 mEq Oral BID Vicki Porter MD   20 mEq at 02/05/21 1997      Medications Prior to Admission   Medication    acetaminophen (TYLENOL) 500 mg tablet    atorvastatin (LIPITOR) 20 mg tablet    cetirizine (ZyrTEC) 10 mg tablet    Cholecalciferol (CVS VITAMIN D) 2000 units CAPS    mirtazapine (REMERON) 7 5 MG tablet    Multiple Vitamins-Minerals (CENTRUM SILVER 50+WOMEN) TABS    RHOPRESSA 0 02 % SOLN    SIMBRINZA 1-0 2 % SUSP    VYZULTA 0 024 % SOLN       Labs:  Results from last 7 days   Lab Units 02/03/21  0450 02/02/21  0532 02/01/21  0513   WBC Thousand/uL 6 41 7 18 7 04   HEMOGLOBIN g/dL 11 2* 12 2 11 5   HEMATOCRIT % 33 3* 37 2 34 3*   PLATELETS Thousands/uL 274 263 229   NEUTROS PCT % 51 54 56   LYMPHS PCT % 27 27 24   MONOS PCT % 14* 12 12   EOS PCT % 6 5 6     Results from last 7 days   Lab Units 02/05/21  0434 02/04/21  0502 02/03/21  0450   POTASSIUM mmol/L 4 4 3 4* 3 5   CHLORIDE mmol/L 106 108 106   CO2 mmol/L 31 31 28   BUN mg/dL 10 8 9   CREATININE mg/dL 0 71 0 78 0 77   CALCIUM mg/dL 8 4 7 8* 7 9*   ALK PHOS U/L 403* 373* 371*   ALT U/L 509* 522* 567*   AST U/L 605* 577* 598*     Lab Results   Component Value Date    TROPONINI <0 02 01/28/2021     Results from last 7 days   Lab Units 02/01/21  0513 01/30/21  0502   INR  1 15 1 34*     Lab Results   Component Value Date    URINECX <10,000 cfu/ml  10/28/2019       Imaging:  Results for orders placed during the hospital encounter of 01/28/21   XR chest portable    Narrative CHEST     INDICATION:   Dyspnea  COMPARISON:  CT abdomen pelvis 1/28/2021    EXAM PERFORMED/VIEWS:  XR CHEST PORTABLE      FINDINGS:    Cardiomediastinal silhouette appears unremarkable  Mild bibasilar atelectasis  Left hemidiaphragm is mildly elevated  No pleural effusion or pneumothorax  Osseous structures appear within normal limits for patient age  Impression Mild bibasilar atelectasis  Workstation performed: [id number]       No results found for this or any previous visit  VTE Pharmacologic Prophylaxis: Heparin      Code Status:   Level 3 - DNAR and DNI    Assessment:  Principal Problem:    Acalculous cholecystitis  Active Problems:    Esophageal reflux    Hyperlipidemia    Macular degeneration    Colonic mass    Elevated blood pressure reading    Anxiety    Transaminitis    Shortness of breath    Cough    Hypokalemia  Resolved Problems:    Abnormal finding on urinalysis    Primary cough headache      Plan:   acute hepatitis presumed secondary to Tylenol overdose status post and acetylcysteine -slight increase in transaminases today    Will continue to monitor periodically    Colon mass presumed malignant -  Though biopsy showing only fragments of villous adenoma with a few foci of high-grade dysplasia -plan for surgical resection when hepatitis has recovered    Hyperlipidemia -statin on hold     Hypokalemia resolved     Cough -continue p r n  albuterol     Disposition-plan to discharge to [facility] on Monday      Mark Zepeda MD  3/4/8352,8:35 PM

## 2021-02-05 NOTE — PLAN OF CARE
Problem: Potential for Falls  Goal: Patient will remain free of falls  Description: INTERVENTIONS:  - Assess patient frequently for physical needs  -  Identify cognitive and physical deficits and behaviors that affect risk of falls    -  [place] fall precautions as indicated by assessment   - Educate patient/family on patient safety including physical limitations  - Instruct patient to call for assistance with activity based on assessment  - Modify environment to reduce risk of injury  - Consider OT/PT consult to assist with strengthening/mobility  Outcome: Progressing     Problem: PAIN - ADULT  Goal: Verbalizes/displays adequate comfort level or baseline comfort level  Description: Interventions:  - Encourage patient to monitor pain and request assistance  - Assess pain using appropriate pain scale  - Administer analgesics based on type and severity of pain and evaluate response  - Implement non-pharmacological measures as appropriate and evaluate response  - Consider cultural and social influences on pain and pain management  - Notify physician/advanced practitioner if interventions unsuccessful or patient reports new pain  Outcome: Progressing     Problem: INFECTION - ADULT  Goal: Absence or prevention of progression during hospitalization  Description: INTERVENTIONS:  - Assess and monitor for signs and symptoms of infection  - Monitor lab/diagnostic results  - Monitor all insertion sites, i e  indwelling lines, tubes, and drains  - Monitor endotracheal if appropriate and nasal secretions for changes in amount and color  - [place] appropriate cooling/warming therapies per order  - Administer medications as ordered  - Instruct and encourage patient and family to use good hand hygiene technique  - Identify and instruct in appropriate isolation precautions for identified infection/condition  Outcome: Progressing  Goal: Absence of fever/infection during neutropenic period  Description: INTERVENTIONS:  - Monitor WBC    Outcome: Progressing     Problem: SAFETY ADULT  Goal: Patient will remain free of falls  Description: INTERVENTIONS:  - Assess patient frequently for physical needs  -  Identify cognitive and physical deficits and behaviors that affect risk of falls    -  [place] fall precautions as indicated by assessment   - Educate patient/family on patient safety including physical limitations  - Instruct patient to call for assistance with activity based on assessment  - Modify environment to reduce risk of injury  - Consider OT/PT consult to assist with strengthening/mobility  Outcome: Progressing  Goal: Maintain or return to baseline ADL function  Description: INTERVENTIONS:  -  Assess patient's ability to carry out ADLs; assess patient's baseline for ADL function and identify physical deficits which impact ability to perform ADLs (bathing, care of mouth/teeth, toileting, grooming, dressing, etc )  - Assess/evaluate cause of self-care deficits   - Assess range of motion  - Assess patient's mobility; develop plan if impaired  - Assess patient's need for assistive devices and provide as appropriate  - Encourage maximum independence but intervene and supervise when necessary  - Involve family in performance of ADLs  - Assess for home care needs following discharge   - Consider OT consult to assist with ADL evaluation and planning for discharge  - Provide patient education as appropriate  Outcome: Progressing  Goal: Maintain or return mobility status to optimal level  Description: INTERVENTIONS:  - Assess patient's baseline mobility status (ambulation, transfers, stairs, etc )    - Identify cognitive and physical deficits and behaviors that affect mobility  - Identify mobility aids required to assist with transfers and/or ambulation (gait belt, sit-to-stand, lift, walker, cane, etc )  - [place] fall precautions as indicated by assessment  - Record patient progress and toleration of activity level on Mobility SBAR; progress patient to next Phase/Stage  - Instruct patient to call for assistance with activity based on assessment  - Consider rehabilitation consult to assist with strengthening/weightbearing, etc   Outcome: Progressing     Problem: DISCHARGE PLANNING  Goal: Discharge to home or other facility with appropriate resources  Description: INTERVENTIONS:  - Identify barriers to discharge w/patient and caregiver  - Arrange for needed discharge resources and transportation as appropriate  - Identify discharge learning needs (meds, wound care, etc )  - Arrange for interpretive services to assist at discharge as needed  - Refer to Case Management Department for coordinating discharge planning if the patient needs post-hospital services based on physician/advanced practitioner order or complex needs related to functional status, cognitive ability, or social support system  Outcome: Progressing     Problem: Knowledge Deficit  Goal: Patient/family/caregiver demonstrates understanding of disease process, treatment plan, medications, and discharge instructions  Description: Complete learning assessment and assess knowledge base    Interventions:  - Provide teaching at level of understanding  - Provide teaching via preferred learning methods  Outcome: Progressing

## 2021-02-05 NOTE — CASE MANAGEMENT
CM received corespondence from [facility] and they will not be able to admit the pt until Monday

## 2021-02-06 PROBLEM — E87.6 HYPOKALEMIA: Status: RESOLVED | Noted: 2021-02-04 | Resolved: 2021-02-06

## 2021-02-06 PROCEDURE — 94664 DEMO&/EVAL PT USE INHALER: CPT

## 2021-02-06 PROCEDURE — 99232 SBSQ HOSP IP/OBS MODERATE 35: CPT | Performed by: INTERNAL MEDICINE

## 2021-02-06 PROCEDURE — 94760 N-INVAS EAR/PLS OXIMETRY 1: CPT

## 2021-02-06 PROCEDURE — 94640 AIRWAY INHALATION TREATMENT: CPT

## 2021-02-06 RX ADMIN — ALBUTEROL SULFATE 2.5 MG: 2.5 SOLUTION RESPIRATORY (INHALATION) at 11:58

## 2021-02-06 RX ADMIN — POTASSIUM CHLORIDE 20 MEQ: 1500 TABLET, EXTENDED RELEASE ORAL at 08:01

## 2021-02-06 RX ADMIN — POTASSIUM CHLORIDE 20 MEQ: 1500 TABLET, EXTENDED RELEASE ORAL at 17:46

## 2021-02-06 RX ADMIN — PANTOPRAZOLE SODIUM 40 MG: 40 TABLET, DELAYED RELEASE ORAL at 05:10

## 2021-02-06 RX ADMIN — HEPARIN SODIUM 5000 UNITS: 5000 INJECTION INTRAVENOUS; SUBCUTANEOUS at 08:01

## 2021-02-06 RX ADMIN — MIRTAZAPINE 15 MG: 15 TABLET, FILM COATED ORAL at 21:48

## 2021-02-06 RX ADMIN — HEPARIN SODIUM 5000 UNITS: 5000 INJECTION INTRAVENOUS; SUBCUTANEOUS at 21:48

## 2021-02-06 NOTE — PROGRESS NOTES
Progress Note - Michael Lot Cuda 80 y o  female MRN: [id number]  Unit/Bed#: -01 Encounter: [id number]    Subjective:   Norm is feeling generally well  Cough is sporadic without shortness of breath  Nebulizer helps  She continues with minimal abdominal discomfort but appetite remains good  She has no nausea or vomiting or diarrhea  All other ROS are negative  Objective:   Vitals: Blood pressure 156/77, pulse 75, temperature 97 8 °F (36 6 °C), temperature source Axillary, resp  rate 18, height 4' 11" (1 499 m), weight 56 2 kg (123 lb 14 4 oz), SpO2 92 %, not currently breastfeeding  ,Body mass index is 25 02 kg/m²  SPO2 RA Rest      ED to Hosp-Admission (Current) from 1/28/2021 in [facility] 4th Floor Med Surg Unit   SpO2  92 %   SpO2 Activity  At Rest   O2 Device  None (Room air)   O2 Flow Rate  6 L/min        I&O:     Intake/Output Summary (Last 24 hours) at 2/6/2021 1136  Last data filed at 2/6/2021 1106  Gross per 24 hour   Intake 240 ml   Output 800 ml   Net -560 ml         Physical Exam:       General Appearance:    Alert, cooperative, no distress   Head:    Normocephalic, without obvious abnormality, atraumatic   Eyes:    PERRL, conjunctiva/corneas clear, EOM's intact       Nose:   Moist mucous membranes, no drainage or sinus tenderness   Throat:   No tenderness, no exudates   Neck:   Supple, symmetrical, trachea midline, no JVD   Lungs:     Clear to auscultation bilaterally, respirations unlabored   Heart:    Regular rate and rhythm, S1 and S2 normal, no murmur, rub   or gallop   Abdomen: Soft, diffusely tender, subcutaneous firm densities surrounding heparin injections are minimally tender  , positive bowel sounds, no masses, no organomegaly   Extremities:  No pedal edema, calf tenderness  Distal pulses palpable  Neurologic:     CNII-XII intact        Invasive Devices     None                       Social History  reviewed  Family History   Problem Relation Age of Onset    Diabetes type II Mother     Pancreatic cancer Father     Colon cancer Sister     reviewed    Meds:  Current Facility-Administered Medications   Medication Dose Route Frequency Provider Last Rate Last Admin    albuterol inhalation solution 2 5 mg  2 5 mg Nebulization Q6H PRN Alan Schultz MD   2 5 mg at 02/04/21 1710    Brinzolamide-Brimonidine 1-0 2 % SUSP 1 drop  1 drop Both Eyes BID Essex García, DO   1 drop at 02/06/21 0802    heparin (porcine) subcutaneous injection 5,000 Units  5,000 Units Subcutaneous Q12H [address] Robin García, DO   5,000 Units at 02/06/21 0801    ibuprofen (MOTRIN) tablet 400 mg  400 mg Oral Q6H PRN Alan Schultz MD        Latanoprostene Bunod 0 024 % SOLN 1 drop  1 drop Both Eyes HS Essex García, DO   1 drop at 02/05/21 2145    mirtazapine (REMERON) tablet 15 mg  15 mg Oral HS Alan Schultz MD   15 mg at 02/05/21 2144    Netarsudil Dimesylate 0 02 % SOLN 1 drop  1 drop Both Eyes HS Essex García, DO   1 drop at 02/05/21 2145    ondansetron (ZOFRAN) injection 4 mg  4 mg Intravenous Q6H PRN Essex García, DO        pantoprazole (PROTONIX) EC tablet 40 mg  40 mg Oral Early Morning Alan Schultz MD   40 mg at 02/06/21 0510    potassium chloride (K-DUR,KLOR-CON) CR tablet 20 mEq  20 mEq Oral BID Alan Schultz MD   20 mEq at 02/06/21 0801      Medications Prior to Admission   Medication    acetaminophen (TYLENOL) 500 mg tablet    atorvastatin (LIPITOR) 20 mg tablet    cetirizine (ZyrTEC) 10 mg tablet    Cholecalciferol ([employer] VITAMIN D) 2000 units CAPS    mirtazapine (REMERON) 7 5 MG tablet    Multiple Vitamins-Minerals (CENTRUM SILVER 50+WOMEN) TABS    RHOPRESSA 0 02 % SOLN    SIMBRINZA 1-0 2 % SUSP    VYZULTA 0 024 % SOLN       Labs:  Results from last 7 days   Lab Units 02/03/21  0450 02/02/21  0532 02/01/21  0513   WBC Thousand/uL 6 41 7 18 7 04   HEMOGLOBIN g/dL 11 2* 12 2 11 5   HEMATOCRIT % 33 3* 37 2 34 3*   PLATELETS Thousands/uL 274 263 229   NEUTROS PCT % 51 54 56 LYMPHS PCT % 27 27 24   MONOS PCT % 14* 12 12   EOS PCT % 6 5 6     Results from last 7 days   Lab Units 02/05/21  0434 02/04/21  0507 02/03/21  0450   POTASSIUM mmol/L 4 4 3 4* 3 5   CHLORIDE mmol/L 106 108 106   CO2 mmol/L 31 31 28   BUN mg/dL 10 8 9   CREATININE mg/dL 0 71 0 78 0 77   CALCIUM mg/dL 8 4 7 8* 7 9*   ALK PHOS U/L 403* 373* 371*   ALT U/L 509* 522* 567*   AST U/L 605* 577* 598*     Lab Results   Component Value Date    TROPONINI <0 02 01/28/2021     Results from last 7 days   Lab Units 02/01/21  0513   INR  1 15     Lab Results   Component Value Date    URINECX <10,000 cfu/ml  10/28/2019       Imaging:  Results for orders placed during the hospital encounter of 01/28/21   XR chest portable    Narrative CHEST     INDICATION:   Dyspnea  COMPARISON:  CT abdomen pelvis 1/28/2021    EXAM PERFORMED/VIEWS:  XR CHEST PORTABLE      FINDINGS:    Cardiomediastinal silhouette appears unremarkable  Mild bibasilar atelectasis  Left hemidiaphragm is mildly elevated  No pleural effusion or pneumothorax  Osseous structures appear within normal limits for patient age  Impression Mild bibasilar atelectasis  Workstation performed: [id number]       No results found for this or any previous visit  VTE Pharmacologic Prophylaxis: Heparin      Code Status:   Level 3 - DNAR and DNI    Assessment:  Principal Problem:    Acalculous cholecystitis  Active Problems:    Esophageal reflux    Hyperlipidemia    Macular degeneration    Colonic mass    Elevated blood pressure reading    Anxiety    Transaminitis    Shortness of breath    Cough  Resolved Problems:    Abnormal finding on urinalysis    Primary cough headache    Hypokalemia      Plan:  Acute hepatitis presumed secondary to chronic Tylenol overdose status post a course of acetylcysteine-transaminases have generally trended downward, bilirubin trending downward, alk-phos had been trending upward  Will repeat labs again on Monday      Colon mass presumed malignant though biopsy showing only fragments of villous adenoma with high-grade dysplasia-plan for surgical resection when hepatitis has improved  Hyperlipidemia-statin on hold    Cough-continue p r n  Albuterol    Disposition-plan to [facility] Monday      Jasmin Gaines MD  6/2/8480,61:85 AM

## 2021-02-06 NOTE — TELEPHONE ENCOUNTER
Deepthi Chatman wants to speak to Dr Lupe Desai    they are not giving patient the eye drops that she brought with her  and doesn't get the drops when she normally gets them  Mary Martin also concerned about her breathing     when Deepthi Chatman speaks to her on the ph she can hardly finish a sentence    she spoke to the nurse and she sd that only happens some of the time    they are only giving her breathing treatment once at night    and usually gets them every 6 hr's    is asking Dr Lupe Desai to [facility] every 6 hr's, so then they can do it that way

## 2021-02-06 NOTE — PLAN OF CARE
Problem: Potential for Falls  Goal: Patient will remain free of falls  Description: INTERVENTIONS:  - Assess patient frequently for physical needs  -  Identify cognitive and physical deficits and behaviors that affect risk of falls    -  [place] fall precautions as indicated by assessment   - Educate patient/family on patient safety including physical limitations  - Instruct patient to call for assistance with activity based on assessment  - Modify environment to reduce risk of injury  - Consider OT/PT consult to assist with strengthening/mobility  Outcome: Progressing     Problem: PAIN - ADULT  Goal: Verbalizes/displays adequate comfort level or baseline comfort level  Description: Interventions:  - Encourage patient to monitor pain and request assistance  - Assess pain using appropriate pain scale  - Administer analgesics based on type and severity of pain and evaluate response  - Implement non-pharmacological measures as appropriate and evaluate response  - Consider cultural and social influences on pain and pain management  - Notify physician/advanced practitioner if interventions unsuccessful or patient reports new pain  Outcome: Progressing     Problem: INFECTION - ADULT  Goal: Absence or prevention of progression during hospitalization  Description: INTERVENTIONS:  - Assess and monitor for signs and symptoms of infection  - Monitor lab/diagnostic results  - Monitor all insertion sites, i e  indwelling lines, tubes, and drains  - Monitor endotracheal if appropriate and nasal secretions for changes in amount and color  - [place] appropriate cooling/warming therapies per order  - Administer medications as ordered  - Instruct and encourage patient and family to use good hand hygiene technique  - Identify and instruct in appropriate isolation precautions for identified infection/condition  Outcome: Progressing  Goal: Absence of fever/infection during neutropenic period  Description: INTERVENTIONS:  - Monitor WBC    Outcome: Progressing     Problem: SAFETY ADULT  Goal: Patient will remain free of falls  Description: INTERVENTIONS:  - Assess patient frequently for physical needs  -  Identify cognitive and physical deficits and behaviors that affect risk of falls    -  [place] fall precautions as indicated by assessment   - Educate patient/family on patient safety including physical limitations  - Instruct patient to call for assistance with activity based on assessment  - Modify environment to reduce risk of injury  - Consider OT/PT consult to assist with strengthening/mobility  Outcome: Progressing  Goal: Maintain or return to baseline ADL function  Description: INTERVENTIONS:  -  Assess patient's ability to carry out ADLs; assess patient's baseline for ADL function and identify physical deficits which impact ability to perform ADLs (bathing, care of mouth/teeth, toileting, grooming, dressing, etc )  - Assess/evaluate cause of self-care deficits   - Assess range of motion  - Assess patient's mobility; develop plan if impaired  - Assess patient's need for assistive devices and provide as appropriate  - Encourage maximum independence but intervene and supervise when necessary  - Involve family in performance of ADLs  - Assess for home care needs following discharge   - Consider OT consult to assist with ADL evaluation and planning for discharge  - Provide patient education as appropriate  Outcome: Progressing  Goal: Maintain or return mobility status to optimal level  Description: INTERVENTIONS:  - Assess patient's baseline mobility status (ambulation, transfers, stairs, etc )    - Identify cognitive and physical deficits and behaviors that affect mobility  - Identify mobility aids required to assist with transfers and/or ambulation (gait belt, sit-to-stand, lift, walker, cane, etc )  - [place] fall precautions as indicated by assessment  - Record patient progress and toleration of activity level on Mobility SBAR; progress patient to next Phase/Stage  - Instruct patient to call for assistance with activity based on assessment  - Consider rehabilitation consult to assist with strengthening/weightbearing, etc   Outcome: Progressing

## 2021-02-07 PROCEDURE — 99232 SBSQ HOSP IP/OBS MODERATE 35: CPT | Performed by: NURSE PRACTITIONER

## 2021-02-07 RX ADMIN — HEPARIN SODIUM 5000 UNITS: 5000 INJECTION INTRAVENOUS; SUBCUTANEOUS at 21:47

## 2021-02-07 RX ADMIN — HEPARIN SODIUM 5000 UNITS: 5000 INJECTION INTRAVENOUS; SUBCUTANEOUS at 08:27

## 2021-02-07 RX ADMIN — POTASSIUM CHLORIDE 20 MEQ: 1500 TABLET, EXTENDED RELEASE ORAL at 08:27

## 2021-02-07 RX ADMIN — MIRTAZAPINE 15 MG: 15 TABLET, FILM COATED ORAL at 21:47

## 2021-02-07 RX ADMIN — PANTOPRAZOLE SODIUM 40 MG: 40 TABLET, DELAYED RELEASE ORAL at 05:01

## 2021-02-07 RX ADMIN — POTASSIUM CHLORIDE 20 MEQ: 1500 TABLET, EXTENDED RELEASE ORAL at 17:14

## 2021-02-07 NOTE — ASSESSMENT & PLAN NOTE
· New finding:  Soft tissue mass in the ascending colon 3 9 x 2 1 cm  · GI Consulted  · Biopsies of the large mass in the proximal right colon show a villous adenoma with foci of high-grade dysplasia  A underlying malignant neoplasm could not be ruled out  Regardless of this finding, this lesion cannot be removed endoscopically  If it is going to be removed he will have to be done surgically and it is my suspicion particularly in light of an elevated CEA level that this is a malignancy  Dr Richard Marrufo was planning to optimize nutritional benefits as an outpatient and consider a right hemicolectomy months down the road     · Patient has a concerning family history for colon cancer, previous colonoscopy was 6 years ago in negative

## 2021-02-07 NOTE — PROGRESS NOTES
Progress Note - Dia Camilo 5/15/1929, 80 y o  female MRN: [id number]    Unit/Bed#: -01 Encounter: [id number]    Primary Care Provider: Neha Patel MD   Date and time admitted to hospital: 1/28/2021  5:17 PM    Colonic mass  Assessment & Plan  · New finding:  Soft tissue mass in the ascending colon 3 9 x 2 1 cm  · GI Consulted  · Biopsies of the large mass in the proximal right colon show a villous adenoma with foci of high-grade dysplasia  A underlying malignant neoplasm could not be ruled out  Regardless of this finding, this lesion cannot be removed endoscopically  If it is going to be removed he will have to be done surgically and it is my suspicion particularly in light of an elevated CEA level that this is a malignancy  Dr Pierce Francois was planning to optimize nutritional benefits as an outpatient and consider a right hemicolectomy months down the road  · Patient has a concerning family history for colon cancer, previous colonoscopy was 6 years ago in negative    * Acalculous cholecystitis  Assessment & Plan  · Associated right upper quadrant tenderness, elevated bilirubin, jaundice  · With associated transaminitis - suspected chronic tylenol toxicity treated with NAC  · No fever or leukocytosis   · CT scan does show mildly dilated intrahepatic CBD and enhancement of extrahepatic CBD  · CT scan also shows concern of cholangitis but the patient does not meet all criteria Torres Pentad  · Procalcitonin elevated and improving - continue Zosyn  CRP 31 4   · GI consultation and surgery consulted, appreciate input   · HIDA normal, so will need to further discuss with specialties in am but it appears Perc drain and cholecystectomy do NOT need to occur    Transaminitis  Assessment & Plan  · Present on admission, as evidenced by ALT 1122, AST 1185  · Patient does report chronic Tylenol use over last 6 months    · GI Involved - completed NAC Protocol  · Acute hepatitis presumed secondary to chronic Tylenol overdose status post a course of acetylcysteine-transaminases have generally trended downward, bilirubin trending downward, alk-phos had been trending upward  · CMP tomorrow    Cough  Assessment & Plan  Improving  Symptom management    Anxiety  Assessment & Plan  · Chronic - she is very nervous and anxious regarding her admission, workup, and possibility of cancer  · Patient maintained on Mirtazapine  · Supportive measures  Elevated blood pressure reading  Assessment & Plan  · No medications, stable and being monitored  · Likely in the setting of anxiety, resolved    Macular degeneration  Assessment & Plan  · Also has glaucoma; continue all 3 eyedrops from home    Hyperlipidemia  Assessment & Plan  · Hold statins given elevated transaminases  Esophageal reflux  Assessment & Plan  · Stable, no complaints  · Not on any medications at this time       VTE Pharmacologic Prophylaxis:   Pharmacologic: Heparin  Mechanical VTE Prophylaxis in Place: Yes    Patient Centered Rounds: I have performed bedside rounds with nursing staff today  Discussions with Specialists or Other Care Team Provider:  Reviewed previous provider's notes discussed with case management primary RN    Education and Discussions with Family / Patient:  Discussed plan of care with patient family denies any additional questions or concerns at this time    Time Spent for Care: 20 minutes  More than 50% of total time spent on counseling and coordination of care as described above      Current Length of Stay: 10 day(s)    Current Patient Status: Inpatient   Certification Statement: The patient will continue to require additional inpatient hospital stay due to Transition to [facility] tomorrow, monitor CMP     Discharge Plan / Estimated Discharge Date:  Tomorrow      Code Status: Level 3 - DNAR and DNI      Subjective:   Denies any chest pain chest tightness shortness of breath or difficulty breathing denies any abdominal pain offers no complaints at this time other than a cough which appears semi chronic  Emotional support provided agreeable to transfer to [facility] tomorrow    Objective:     Vitals:   Temp (24hrs), Av 2 °F (36 8 °C), Min:98 °F (36 7 °C), Max:98 7 °F (37 1 °C)    Temp:  [98 °F (36 7 °C)-98 7 °F (37 1 °C)] 98 °F (36 7 °C)  HR:  [69-74] 69  Resp:  [17-20] 20  BP: (112-138)/(62-68) 135/68  SpO2:  [93 %-98 %] 95 %  Body mass index is 25 02 kg/m²  Input and Output Summary (last 24 hours): Intake/Output Summary (Last 24 hours) at 2021 1145  Last data filed at 2021 1012  Gross per 24 hour   Intake 350 ml   Output 400 ml   Net -50 ml       Physical Exam:     Physical Exam  Vitals signs and nursing note reviewed  HENT:      Head: Normocephalic  Cardiovascular:      Rate and Rhythm: Normal rate  Pulmonary:      Effort: Pulmonary effort is normal       Breath sounds: Normal breath sounds  Abdominal:      Palpations: Abdomen is soft  Musculoskeletal: Normal range of motion  Skin:     General: Skin is warm and dry  Coloration: Skin is pale  Neurological:      General: No focal deficit present  Mental Status: She is alert  Mental status is at baseline  Psychiatric:         Mood and Affect: Mood normal          Thought Content: Thought content normal          Judgment: Judgment normal        Additional Data:   Labs:    Results from last 7 days   Lab Units 21  0450   WBC Thousand/uL 6 41   HEMOGLOBIN g/dL 11 2*   HEMATOCRIT % 33 3*   PLATELETS Thousands/uL 274   NEUTROS PCT % 51   LYMPHS PCT % 27   MONOS PCT % 14*   EOS PCT % 6     Results from last 7 days   Lab Units 21  0434   POTASSIUM mmol/L 4 4   CHLORIDE mmol/L 106   CO2 mmol/L 31   BUN mg/dL 10   CREATININE mg/dL 0 71   CALCIUM mg/dL 8 4   ALK PHOS U/L 403*   ALT U/L 509*   AST U/L 605*     Results from last 7 days   Lab Units 21  0513   INR  1 15       * I Have Reviewed All Lab Data Listed Above    * Additional Pertinent Lab Tests Reviewed: Misael 66 Admission Reviewed    Recent Cultures (last 7 days):           Last 24 Hours Medication List:   Current Facility-Administered Medications   Medication Dose Route Frequency Provider Last Rate    albuterol  2 5 mg Nebulization Q6H PRN Betsy Helm MD      Brinzolamide-Brimonidine  1 drop Both Eyes BID Betsy Helm MD      heparin (porcine)  5,000 Units Subcutaneous Q12H [facility] Sterling Clonts, DO      ibuprofen  400 mg Oral Q6H PRN Betsy Helm MD      Latanoprostene Bunod  1 drop Both Eyes HS Betsy Helm MD      mirtazapine  15 mg Oral HS Betsy Helm MD      Netarsudil Dimesylate  1 drop Both Eyes HS Betsy Helm MD      ondansetron  4 mg Intravenous Q6H PRN Sterling Clonts, DO      pantoprazole  40 mg Oral Early Morning Betsy Helm MD      potassium chloride  20 mEq Oral BID Betsy Helm MD          Today, Patient Was Seen By: AMNA Lambert    ** Please Note: Dragon 360 Dictation voice to text software may have been used in the creation of this document   **

## 2021-02-07 NOTE — ASSESSMENT & PLAN NOTE
· Associated right upper quadrant tenderness, elevated bilirubin, jaundice  · With associated transaminitis - suspected chronic tylenol toxicity treated with NAC  · No fever or leukocytosis   · CT scan does show mildly dilated intrahepatic CBD and enhancement of extrahepatic CBD  · CT scan also shows concern of cholangitis but the patient does not meet all criteria Torres Pentad  · Procalcitonin elevated and improving - continue Zosyn    CRP 31 4   · GI consultation and surgery consulted, appreciate input   · HIDA normal, so will need to further discuss with specialties in am but it appears Perc drain and cholecystectomy do NOT need to occur

## 2021-02-07 NOTE — ASSESSMENT & PLAN NOTE
· Present on admission, as evidenced by ALT 1122, AST 1185  · Patient does report chronic Tylenol use over last 6 months  · GI Involved - completed NAC Protocol  · Acute hepatitis presumed secondary to chronic Tylenol overdose status post a course of acetylcysteine-transaminases have generally trended downward, bilirubin trending downward, alk-phos had been trending upward     · CMP tomorrow

## 2021-02-08 ENCOUNTER — TELEPHONE (OUTPATIENT)
Dept: INTERNAL MEDICINE CLINIC | Facility: CLINIC | Age: 86
End: 2021-02-08

## 2021-02-08 LAB
ALBUMIN SERPL BCP-MCNC: 1.9 G/DL (ref 3.5–5)
ALP SERPL-CCNC: 470 U/L (ref 46–116)
ALT SERPL W P-5'-P-CCNC: 549 U/L (ref 12–78)
ANION GAP SERPL CALCULATED.3IONS-SCNC: 1 MMOL/L (ref 4–13)
AST SERPL W P-5'-P-CCNC: 808 U/L (ref 5–45)
BILIRUB SERPL-MCNC: 2.3 MG/DL (ref 0.2–1)
BUN SERPL-MCNC: 16 MG/DL (ref 5–25)
CALCIUM ALBUM COR SERPL-MCNC: 10.4 MG/DL (ref 8.3–10.1)
CALCIUM SERPL-MCNC: 8.7 MG/DL (ref 8.3–10.1)
CHLORIDE SERPL-SCNC: 102 MMOL/L (ref 100–108)
CO2 SERPL-SCNC: 30 MMOL/L (ref 21–32)
CREAT SERPL-MCNC: 0.86 MG/DL (ref 0.6–1.3)
ERYTHROCYTE [DISTWIDTH] IN BLOOD BY AUTOMATED COUNT: 18.7 % (ref 11.6–15.1)
GFR SERPL CREATININE-BSD FRML MDRD: 59 ML/MIN/1.73SQ M
GLUCOSE SERPL-MCNC: 100 MG/DL (ref 65–140)
HCT VFR BLD AUTO: 37.8 % (ref 34.8–46.1)
HGB BLD-MCNC: 12.2 G/DL (ref 11.5–15.4)
MCH RBC QN AUTO: 29.8 PG (ref 26.8–34.3)
MCHC RBC AUTO-ENTMCNC: 32.3 G/DL (ref 31.4–37.4)
MCV RBC AUTO: 92 FL (ref 82–98)
PLATELET # BLD AUTO: 305 THOUSANDS/UL (ref 149–390)
PMV BLD AUTO: 10.4 FL (ref 8.9–12.7)
POTASSIUM SERPL-SCNC: 4.4 MMOL/L (ref 3.5–5.3)
PROT SERPL-MCNC: 8.4 G/DL (ref 6.4–8.2)
RBC # BLD AUTO: 4.1 MILLION/UL (ref 3.81–5.12)
SODIUM SERPL-SCNC: 133 MMOL/L (ref 136–145)
WBC # BLD AUTO: 7.6 THOUSAND/UL (ref 4.31–10.16)

## 2021-02-08 PROCEDURE — 80053 COMPREHEN METABOLIC PANEL: CPT | Performed by: NURSE PRACTITIONER

## 2021-02-08 PROCEDURE — 97116 GAIT TRAINING THERAPY: CPT

## 2021-02-08 PROCEDURE — 85027 COMPLETE CBC AUTOMATED: CPT | Performed by: NURSE PRACTITIONER

## 2021-02-08 PROCEDURE — 99232 SBSQ HOSP IP/OBS MODERATE 35: CPT | Performed by: INTERNAL MEDICINE

## 2021-02-08 PROCEDURE — 97110 THERAPEUTIC EXERCISES: CPT

## 2021-02-08 PROCEDURE — 97535 SELF CARE MNGMENT TRAINING: CPT

## 2021-02-08 RX ADMIN — POTASSIUM CHLORIDE 20 MEQ: 1500 TABLET, EXTENDED RELEASE ORAL at 08:36

## 2021-02-08 RX ADMIN — PANTOPRAZOLE SODIUM 40 MG: 40 TABLET, DELAYED RELEASE ORAL at 05:02

## 2021-02-08 RX ADMIN — HEPARIN SODIUM 5000 UNITS: 5000 INJECTION INTRAVENOUS; SUBCUTANEOUS at 08:36

## 2021-02-08 RX ADMIN — POTASSIUM CHLORIDE 20 MEQ: 1500 TABLET, EXTENDED RELEASE ORAL at 17:37

## 2021-02-08 RX ADMIN — HEPARIN SODIUM 5000 UNITS: 5000 INJECTION INTRAVENOUS; SUBCUTANEOUS at 20:49

## 2021-02-08 NOTE — CASE MANAGEMENT
CM was provided with a new insurance card from the pt  Cm reviewed all scripts and [facility] was informed by the insurance company that they were Yanni Wilburn  CM updated [facility] about the new or additional insurance for the pt

## 2021-02-08 NOTE — OCCUPATIONAL THERAPY NOTE
Occupational Therapy Treatment Note        Patient Name: Efrain Elliott  XAFKR'[id number] Date: 2/8/2021 02/08/21 0738   OT Last Visit   OT Visit Date 02/08/21   Note Type   Note Type Treatment   Restrictions/Precautions   Weight Bearing Precautions Per Order No   Braces or Orthoses Other (Comment)  (none reported by pt)   Other Precautions Chair Alarm; Bed Alarm; Fall Risk;Visual impairment   Pain Assessment   Pain Assessment Tool 0-10   Pain Score No Pain   ADL   Where Assessed Sitting at sink   Eating Assistance 5  Supervision/Setup   Eating Deficit Setup; Increased time to complete   Grooming Assistance 5  Supervision/Setup   Grooming Deficit Setup; Increased time to complete;Supervision/safety; Wash/dry face; Teeth care   Grooming Comments Standing at sink with unilateral to no UE support while performing teeth care   UB Bathing Assistance 5  Supervision/Setup   UB Bathing Deficit Setup; Increased time to complete   LB Bathing Assistance 4  Minimal Assistance   LB Bathing Deficit Setup; Increased time to complete;Right lower leg including foot; Left lower leg including foot   UB Dressing Assistance 5  Supervision/Setup   UB Dressing Deficit Setup; Increased time to complete   LB Dressing Assistance 4  Minimal Assistance   LB Dressing Deficit Setup;Verbal cueing; Increased time to complete   Toileting Assistance  5  Supervision/Setup   Functional Standing Tolerance   Time ~2-3 minute time intervals x3   Activity Functional transfers, mobility, LB bathing, and grooming task   Comments Performed transfers/mobility w/ RW, no UE support on sink during grooming   Bed Mobility   Rolling R 5  Supervision   Additional items Assist x 1; Increased time required   Supine to Sit 4  Minimal assistance  (CGA)   Additional items Assist x 1;Bedrails; Increased time required;Verbal cues   Additional Comments Patient received lying supine in bed upon OT arrival; at end of session: pt seated OOB to recliner chair w/ all needs within reach and working with PT   Transfers   Sit to Stand 4  Minimal assistance  (CGA)   Additional items Assist x 1; Increased time required;Verbal cues;Armrests   Stand to Sit 4  Minimal assistance  (CGA)   Additional items Assist x 1; Increased time required;Armrests; Verbal cues   Toilet transfer 4  Minimal assistance   Additional items Assist x 1; Armrests; Increased time required;Verbal cues; Commode   Additional Comments Performed functional transfers with RW  Patient required verbal cueing for safe hand placement during transfers   Functional Mobility   Functional Mobility 4  Minimal assistance  (CGA)   Additional Comments x1; ambulation to and from the bathroom with no noted LOB   Additional items Rolling walker   Toilet Transfers   Toilet Transfer From Bed   Toilet Transfer Type To   Toilet Transfer to   (Commode in bathroom)   Toilet Transfer Technique Ambulating   Toilet Transfers Contact guard   Cognition   Overall Cognitive Status WFL   Arousal/Participation Alert; Responsive; Cooperative   Attention Within functional limits   Orientation Level Oriented X4   Memory Within functional limits   Following Commands Follows all commands and directions without difficulty   Comments Patient agreeable to OT session   Activity Tolerance   Activity Tolerance Patient limited by fatigue   Medical Staff Made Aware PRICE [place] confirmed pt appropriate for therapy   Assessment   Assessment Patient participated in Skilled OT session this date with interventions consisting of ADL re training with the use of correct body mechnaics, deep breathing technique, safety awareness and fall prevention techniques,  therapeutic activities to: increase activity tolerance, increase standing tolerance time with unilateral UE support to complete sink level ADLs, increase dynamic sit/ stand balance during functional activity  and increase OOB/ sitting tolerance, and increasing standing tolerance   Patient agreeable to OT treatment session, upon arrival patient was found supine in bed, alert, responsive  and in no apparent distress  In comparison to previous session, patient with improvements in functional transfers and task engagement  Patient participated in [address] and LB ADLs while seated and standing at sink level; please see above flowsheet for details  Patient demonstrated good static and fair dynamic sitting balance, and fair static and fair- dynamic standing balance while performing functional tasks with unilateral to no UE support on sink counter  Patient demonstrated signs/symptoms of exertion during ADLs, requiring min verbal cueing for deep breathing technique  Patient requiring ocassional safety reminders  Patient continues to be functioning below baseline level, occupational performance remains limited secondary to factors listed above and increased risk for falls and injury  From OT standpoint, recommendation at time of d/c would be post-acute rehabilitation services  Patient to benefit from continued Occupational Therapy treatment while in the hospital to address deficits as defined above and maximize level of functional independence with ADLs and functional mobility  Plan   Treatment Interventions ADL retraining;Visual perceptual retraining;Functional transfer training;UE strengthening/ROM; Endurance training;Patient/family training; Compensatory technique education; Activityengagement   Goal Expiration Date 02/15/21  (Goals extended from IE, goals still appropriate at this time)   OT Treatment Day 2   OT Frequency 3-5x/wk   Recommendation   OT Discharge Recommendation Post-Acute Rehabilitation Services   AM-PAC Daily Activity Inpatient   Lower Body Dressing 3   Bathing 3   Toileting 3   Upper Body Dressing 3   Grooming 3   Eating 4   Daily Activity Raw Score 19   Daily Activity Standardized Score (Calc for Raw Score >=11) 40 22   AM-PAC Applied Cognition Inpatient   Following a Speech/Presentation 4   Understanding Ordinary Conversation 4   Taking Medications 3   Remembering Where Things Are Placed or Put Away 3   Remembering List of 4-5 Errands 3   Taking Care of Complicated Tasks 3   Applied Cognition Raw Score 20   Applied Cognition Standardized Score 41 76   Modified Pickett Scale   Modified Pickett Scale 4     Goals extended from initial OT evaluation on 1/29/20, as they are still appropriate for patient's current level of occupational functioning  *ADL transfers with (I) for inc'd independence with ADLs/purposeful tasks     *UB ADL with (I) for inc'd independence with self cares     *LB ADL with (I) using AE prn for inc'd independence with self cares     *Toileting with (I) for clothing management and hygiene for return to [facility] with personal care     *Increase stand tolerance x8 m for inc'd tolerance with standing purposeful tasks     *Participate in 10m UE therex to increase overall stamina/activity tolerance for purposeful tasks     *Bed mobility- (I) for inc'd independence to manage own comfort and initiate EOB & OOB purposeful tasks     *Patient will verbalize 3 safety awareness/ principles to prevent falls in the home setting       *Patient will increase OOB/sitting tolerance to 2-4 hours per day to increase participation in self-care and leisure tasks with no s/s of exertion       Jacinta Sargent, OTR/L

## 2021-02-08 NOTE — PHYSICAL THERAPY NOTE
Physical Therapy Treatment Note       02/08/21 0803   PT Last Visit   PT Visit Date 02/08/21   Note Type   Note Type Treatment for insurance authorization   Pain Assessment   Pain Assessment Tool Pain Assessment not indicated - pt denies pain   Restrictions/Precautions   Weight Bearing Precautions Per Order No   Braces or Orthoses   (none reported)   Other Precautions Visual impairment; Bed Alarm; Chair Alarm; Fall Risk   General   Chart Reviewed Yes   Response to Previous Treatment Patient with no complaints from previous session  Family/Caregiver Present No   Cognition   Overall Cognitive Status WFL   Arousal/Participation Alert; Responsive; Cooperative   Attention Within functional limits   Orientation Level Oriented X4   Memory Within functional limits   Following Commands Follows all commands and directions without difficulty   Comments pt agreeable to PT session; pleasant & cooperative   Subjective   Subjective "I hope to go to rehab today" "I feel better today"   Bed Mobility   Additional Comments pt received OOB to recliner upon arrival   Transfers   Sit to Stand 4  Minimal assistance  (CGA)   Additional items Assist x 1; Armrests; Increased time required;Verbal cues   Stand to Sit 4  Minimal assistance  (CGA)   Additional items Assist x 1; Armrests; Increased time required;Verbal cues   Additional Comments vc for RW management & safety; especially as pt tendency is to abandon RW prior to seated rest post gait trial   Ambulation/Elevation   Gait pattern Decreased foot clearance; Excessively slow; Short stride   Gait Assistance 4  Minimal assist  (CGA)   Additional items Assist x 1;Verbal cues   Assistive Device Rolling walker   Distance 40'   Stair Management Assistance Not tested   Balance   Static Sitting Good   Dynamic Sitting Fair +   Static Standing Fair   Dynamic Standing Fair -   Ambulatory Fair -   Endurance Deficit   Endurance Deficit Yes   Activity Tolerance   Activity Tolerance Patient limited by fatigue Nurse Made Aware PRICE Reese verbalized pt appropriate to see, made aware of session outcome/recs   Exercises   Hip Abduction Sitting;10 reps;AROM; Bilateral   Hip Adduction Sitting;10 reps;AROM; Bilateral   Knee AROM Long Arc Quad Sitting;10 reps;AROM; Bilateral   Ankle Pumps Sitting;10 reps;AROM; Bilateral   Marching Sitting;10 reps;AROM; Bilateral   Assessment   Prognosis Good   Problem List Decreased strength;Decreased endurance; Impaired balance;Decreased mobility; Impaired vision   Assessment Pt seen for PT treatment session this date with interventions consisting of gait training w/ emphasis on improving pt's ability to ambulate level surfaces x 40' with CGA provided by therapist with RW and Therapeutic exercise consisting of: AROM 10 reps B LE in sitting position  Pt agreeable to PT treatment session upon arrival, pt found seated OOB in recliner, in no apparent distress and responsive  In comparison to previous session, pt with improvements in decreased assistance for transfers & gait trial; continued LE Exercises to tolerance without pain reported; increased household distance gait tolerance without LOB observed  Post session: pt returned back to recliner set up with breakfast, chair alarm engaged, all needs in reach and RN notified of session findings/recommendations  Continue to recommend STR at time of d/c in order to maximize pt's functional independence and safety w/ mobility  Pt continues to be functioning below baseline level, and remains limited 2* factors listed above  PT will continue to see pt while here in order to address the deficits listed above and provide interventions consistent w/ POC in effort to achieve STGs     Barriers to Discharge Decreased caregiver support   Goals   Patient Goals "to walk around and not feel so tired"   STG Expiration Date 02/18/21   Short Term Goal #1 In 7-10 days: Increase bilateral LE strength 1/2 grade to facilitate independent mobility, Perform all bed mobility tasks modified independent to decrease caregiver burden, Perform all transfers modified independent to improve independence, Ambulate > 150 ft  with RW modified independent w/o LOB and w/ normalized gait pattern 100% of the time, Increase all balance 1 grade to decrease risk for falls and Complete exercise program independently   PT Treatment Day 3   Plan   Treatment/Interventions Functional transfer training;LE strengthening/ROM; Therapeutic exercise; Endurance training;Patient/family training;Equipment eval/education; Bed mobility;Gait training;Spoke to nursing   Progress Progressing toward goals   PT Frequency   (3-5x/wk)   Recommendation   PT Discharge Recommendation Post-Acute Rehabilitation Services   Equipment Recommended Walker  (RW)   PT - OK to Discharge Yes  (when medically cleared if to STR)   [address] Mobility Inpatient   Turning in Bed Without Bedrails 3   Lying on Back to Sitting on Edge of Flat Bed 3   Moving Bed to Chair 3   Standing Up From Chair 3   Walk in Room 3   Climb 3-5 Stairs 2   Basic Mobility Inpatient Raw Score 17   Basic Mobility Standardized Score 39 67    The patient's AM-PAC Basic Mobility Inpatient Short Form Raw Score is 17, Standardized Score is 39 67  A standardized score less than 42 9 suggests the patient may benefit from discharge to post-acute rehabilitation services  Please also refer to the recommendation of the Physical Therapist for safe discharge planning           Felix Diaz, PT, DPT

## 2021-02-08 NOTE — PLAN OF CARE
Problem: OCCUPATIONAL THERAPY ADULT  Goal: Performs self-care activities at highest level of function for planned discharge setting  See evaluation for individualized goals  Description: Treatment Interventions: ADL retraining, Visual perceptual retraining, Functional transfer training, UE strengthening/ROM, Endurance training, Patient/family training, Compensatory technique education, Activityengagement          See flowsheet documentation for full assessment, interventions and recommendations  Outcome: Progressing  Note: Limitation: Decreased ADL status, Decreased UE strength, Decreased endurance, Decreased self-care trans, Visual deficit, Decreased high-level ADLs  Prognosis: Good  Assessment: Patient participated in Skilled OT session this date with interventions consisting of ADL re training with the use of correct body mechnaics, deep breathing technique, safety awareness and fall prevention techniques,  therapeutic activities to: increase activity tolerance, increase standing tolerance time with unilateral UE support to complete sink level ADLs, increase dynamic sit/ stand balance during functional activity  and increase OOB/ sitting tolerance, and increasing standing tolerance  Patient agreeable to OT treatment session, upon arrival patient was found supine in bed, alert, responsive  and in no apparent distress  In comparison to previous session, patient with improvements in functional transfers and task engagement  Patient participated in [address] and LB ADLs while seated and standing at sink level; please see above flowsheet for details  Patient demonstrated good static and fair dynamic sitting balance, and fair static and fair- dynamic standing balance while performing functional tasks with unilateral to no UE support on sink counter  Patient demonstrated signs/symptoms of exertion during ADLs, requiring min verbal cueing for deep breathing technique  Patient requiring ocassional safety reminders  Patient continues to be functioning below baseline level, occupational performance remains limited secondary to factors listed above and increased risk for falls and injury  From OT standpoint, recommendation at time of d/c would be post-acute rehabilitation services  Patient to benefit from continued Occupational Therapy treatment while in the hospital to address deficits as defined above and maximize level of functional independence with ADLs and functional mobility        OT Discharge Recommendation: Post-Acute Rehabilitation Services  OT - OK to Discharge: Yes(Once medically cleared )

## 2021-02-08 NOTE — PLAN OF CARE
Problem: PHYSICAL THERAPY ADULT  Goal: Performs mobility at highest level of function for planned discharge setting  See evaluation for individualized goals  Description: Treatment/Interventions: Functional transfer training, LE strengthening/ROM, Therapeutic exercise, Endurance training, Patient/family training, Equipment eval/education, Bed mobility, Gait training, Spoke to nursing, OT          See flowsheet documentation for full assessment, interventions and recommendations  Outcome: Progressing  Note: Prognosis: Good  Problem List: Decreased strength, Decreased endurance, Impaired balance, Decreased mobility, Impaired vision  Assessment: Pt seen for PT treatment session this date with interventions consisting of gait training w/ emphasis on improving pt's ability to ambulate level surfaces x 40' with CGA provided by therapist with RW and Therapeutic exercise consisting of: AROM 10 reps B LE in sitting position  Pt agreeable to PT treatment session upon arrival, pt found seated OOB in recliner, in no apparent distress and responsive  In comparison to previous session, pt with improvements in decreased assistance for transfers & gait trial; continued LE Exercises to tolerance without pain reported; increased household distance gait tolerance without LOB observed  Post session: pt returned back to recliner set up with breakfast, chair alarm engaged, all needs in reach and RN notified of session findings/recommendations  Continue to recommend STR at time of d/c in order to maximize pt's functional independence and safety w/ mobility  Pt continues to be functioning below baseline level, and remains limited 2* factors listed above  PT will continue to see pt while here in order to address the deficits listed above and provide interventions consistent w/ POC in effort to achieve STGs    Barriers to Discharge: Decreased caregiver support  Barriers to Discharge Comments: patient reports her daughter will be staying with her at discharge  PT Discharge Recommendation: [address]     PT - OK to Discharge: Yes(when medically cleared if to [employer])    See flowsheet documentation for full assessment

## 2021-02-08 NOTE — CASE MANAGEMENT
[facility] does not accept the pt new insurance  Cm reached out to [address] [place] [address] and they are in Network and have begun the auth for pt placement  CM called and spoke with Patricia August the pt dtr [phone]  To update her that [facility] [address]  was [facility] and did not accept the pt insurance  CM also informed her that [facility] is in Network and is working on the Lorena Gax[place]ola for the pt  CM met with the pt and updated her about the new placment and auth submission  CM will be able to updated the treatment team and family tomorrow

## 2021-02-08 NOTE — PROGRESS NOTES
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  A underlying malignant neoplasm could not be ruled out  Surgeon on  board, and plan is to do surgical intervention once transaminitis is improved      * Acalculous cholecystitis    · There is  mild right upper quadrant/epigastric tenderness  No nausea or vomiting, she is able to tolerate diet well  · No fever or leukocytosis   Antibiotics discontinued  · CT scan does show mildly dilated intrahepatic CBD and enhancement of extrahepatic CBD  HIDA scan normal   · GI and general surgery on board    Chronic Tylenol toxicity/transaminitis    · Present on admission, as evidenced by ALT 1122, AST 1185  · Patient does report chronic Tylenol use over last 6 months  · GI Involved - completed NAC Protocol  · Today patient has again up trending LFTs,   will repeat LFTs tomorrow      Cough    Improving  Continue to monitor, patient denies shortness of breath      Anxiety  Assessment & Plan  · Chronic - she is very nervous and anxious regarding her admission, workup, and possibility of cancer  · Continue mirtazapine     Elevated blood pressure reading    · No medications, stable and being monitored     Macular degeneration  Assessment & Plan  · Also has glaucoma; continue all 3 eyedrops from home     Hyperlipidemia  Assessment & Plan  · Hold statins given elevated transaminases      Esophageal reflux  Assessment & Plan  · Stable, no complaints  · Not on any medications at this time  ·   VTE Pharmacologic Prophylaxis:   Pharmacologic: Heparin  Mechanical VTE Prophylaxis in Place: Yes    Current Length of Stay: 11 day(s)    Current Patient Status: Inpatient     Disposition-Pending insurance authorization, pending nursing home placement    Code Status: Level 3 - DNAR and DNI      Subjective: Today I saw and examined the patient at bedside  Patient was breathing on room air  She denied abdominal pain, nausea or vomiting  She was able to tolerate diet  Vital signs have been stable  No acute event occurred overnight  Patient is pending placement, her insurance changed, she provided with new insurance card today   on board and working on that  Objective:     Vitals:   Temp (24hrs), Av 1 °F (36 7 °C), Min:97 9 °F (36 6 °C), Max:98 5 °F (36 9 °C)    Temp:  [97 9 °F (36 6 °C)-98 5 °F (36 9 °C)] 98 °F (36 7 °C)  HR:  [63-68] 68  Resp:  [18-19] 18  BP: (124-138)/(59-66) 125/66  SpO2:  [94 %-95 %] 95 %  Body mass index is 25 02 kg/m²  Input and Output Summary (last 24 hours): Intake/Output Summary (Last 24 hours) at 2021 1336  Last data filed at 2021 0803  Gross per 24 hour   Intake 180 ml   Output 551 ml   Net -371 ml       Physical Exam:     Physical Exam  Constitutional:       Appearance: Normal appearance  She is obese  She is not toxic-appearing  HENT:      Head: Normocephalic and atraumatic  Cardiovascular:      Rate and Rhythm: Normal rate and regular rhythm  Pulses: Normal pulses  Heart sounds: Normal heart sounds  Pulmonary:      Effort: Pulmonary effort is normal       Breath sounds: Normal breath sounds  Abdominal:      General: Bowel sounds are normal       Palpations: Abdomen is soft  Tenderness: There is abdominal tenderness  Musculoskeletal: Normal range of motion  Right lower leg: No edema  Left lower leg: No edema  Skin:     General: Skin is warm  Neurological:      General: No focal deficit present  Mental Status: She is alert and oriented to person, place, and time             Additional Data:     Labs:    Results from last 7 days   Lab Units 21  0517 21  0450   WBC Thousand/uL 7 60 6 41   HEMOGLOBIN g/dL 12 2 11 2*   HEMATOCRIT % 37 8 33 3* PLATELETS Thousands/uL 305 274   NEUTROS PCT %  --  51   LYMPHS PCT %  --  27   MONOS PCT %  --  14*   EOS PCT %  --  6     Results from last 7 days   Lab Units 02/08/21  0517   POTASSIUM mmol/L 4 4   CHLORIDE mmol/L 102   CO2 mmol/L 30   BUN mg/dL 16   CREATININE mg/dL 0 86   CALCIUM mg/dL 8 7   ALK PHOS U/L 470*   ALT U/L 549*   AST U/L 808*           * I Have Reviewed All Lab Data Listed Above  * Additional Pertinent Lab Tests Reviewed: [address] Admission Reviewed      Recent Cultures (last 7 days):           Last 24 Hours Medication List:   Current Facility-Administered Medications   Medication Dose Route Frequency Provider Last Rate    albuterol  2 5 mg Nebulization Q6H PRN Huang Miner MD      Brinzolamide-Brimonidine  1 drop Both Eyes BID Huang Miner MD      heparin (porcine)  5,000 Units Subcutaneous Q12H [address] Amie Hearing, DO      ibuprofen  400 mg Oral Q6H PRN Huang Miner MD      Latanoprostene Bunod  1 drop Both Eyes HS Huang Miner MD      mirtazapine  15 mg Oral HS Huang Miner MD      Netarsudil Dimesylate  1 drop Both Eyes HS Huang Miner MD      ondansetron  4 mg Intravenous Q6H PRN Amie Cantu DO      pantoprazole  40 mg Oral Early Morning Huang Miner MD      potassium chloride  20 mEq Oral BID Huang Miner MD          Today, Patient Was Seen By: Jakob Chappell MD    ** Please Note: This note has been constructed using a voice recognition system   **

## 2021-02-08 NOTE — UTILIZATION REVIEW
Continued Stay Review    Date: 2/7                         Current Patient Class:IP  Current Level of Care: MS     HPI:89 y o  female initially admitted on 1/28 hx osteoporosis, sciatica initially admitted on 1/28/21 as inpatient due to acalculous cholecystitis  Assessment/Plan: plan for transfer to [facility] tomorrow  Pt w/ colonic mass, plan for optimize nutritional benefits as an outpatient and consider a right hemicolectomy months down the road       Pertinent Labs/Diagnostic Results:   Results from last 7 days   Lab Units 02/04/21  1530   SARS-COV-2  Negative     Results from last 7 days   Lab Units 02/03/21  0450 02/02/21  0532   WBC Thousand/uL 6 41 7 18   HEMOGLOBIN g/dL 11 2* 12 2   HEMATOCRIT % 33 3* 37 2   PLATELETS Thousands/uL 274 263   NEUTROS ABS Thousands/µL 3 33 3 88     Results from last 7 days   Lab Units 02/05/21  0434 02/04/21  0507 02/03/21  0450 02/02/21  0532   SODIUM mmol/L 137 140 135* 137   POTASSIUM mmol/L 4 4 3 4* 3 5 4 4   CHLORIDE mmol/L 106 108 106 106   CO2 mmol/L 31 31 28 28   ANION GAP mmol/L 0* 1* 1* 3*   BUN mg/dL 10 8 9 7   CREATININE mg/dL 0 71 0 78 0 77 0 70   EGFR ml/min/1 73sq m 75 67 68 76   CALCIUM mg/dL 8 4 7 8* 7 9* 8 4   MAGNESIUM mg/dL 2 0  --   --   --      Results from last 7 days   Lab Units 02/05/21  0434 02/04/21  0507 02/03/21  0450 02/02/21  0532   AST U/L 605* 577* 598* 662*   ALT U/L 509* 522* 567* 663*   ALK PHOS U/L 403* 373* 371* 356*   TOTAL PROTEIN g/dL 7 0 6 6 6 7 7 2   ALBUMIN g/dL 1 7* 1 7* 1 7* 2 0*   TOTAL BILIRUBIN mg/dL 2 00* 2 00* 3 00* 3 90*   BILIRUBIN DIRECT mg/dL  --   --  2 49* 3 15*     Results from last 7 days   Lab Units 02/05/21  0434 02/04/21  0507 02/03/21  0450 02/02/21  0532   GLUCOSE RANDOM mg/dL 99 96 108 83     Results from last 7 days   Lab Units 02/02/21  1351   CEA ng/mL 5 3*     Results from last 7 days   Lab Units 02/04/21  1530   INFLUENZA A PCR  Negative   INFLUENZA B PCR  Negative   RSV PCR  Negative       Vital Signs:  02/07/21 2222  97 9 °F (36 6 °C)  64  18  138/61  88  94 %  None (Room air)  Lying   02/07/21 1526  98 5 °F (36 9 °C)  63  19  124/59  84  95 %  Nasal cannula  Lying   02/07/21 1443  --  --  --  --  --  --  Nasal cannula  --   02/07/21 07:21:47  98 °F (36 7 °C)  69  20  135/68  92  95 %             Medications:   Scheduled Medications:  Brinzolamide-Brimonidine, 1 drop, Both Eyes, BID  heparin (porcine), 5,000 Units, Subcutaneous, Q12H [facility]  Latanoprostene Bunod, 1 drop, Both Eyes, HS  mirtazapine, 15 mg, Oral, HS  Netarsudil Dimesylate, 1 drop, Both Eyes, HS  pantoprazole, 40 mg, Oral, Early Morning  potassium chloride, 20 mEq, Oral, BID      Continuous IV Infusions:     PRN Meds:  albuterol, 2 5 mg, Nebulization, Q6H PRN  ibuprofen, 400 mg, Oral, Q6H PRN  ondansetron, 4 mg, Intravenous, Q6H PRN        Discharge Plan: D     Network Utilization Review Department  ATTENTION: Please call with any questions or concerns to [phone] and carefully listen to the prompts so that you are directed to the right person  All voicemails are confidential   Sravanthi Muss all requests for admission clinical reviews, approved or denied determinations and any other requests to dedicated fax number below belonging to the campus where the patient is receiving treatment   List of dedicated fax numbers for the Facilities:  [address] DENIALS (Administrative/Medical Necessity) [phone]   [address] (Maternity/NICU/Pediatrics) [phone]   [address] Monica Morales 0741 (Stan Khalil "Mariel" 103) [address] [place] [address] [phone]   [address] 216 [facility] [phone]   [place] [address] [facility] [phone]

## 2021-02-09 VITALS
BODY MASS INDEX: 24.98 KG/M2 | RESPIRATION RATE: 20 BRPM | WEIGHT: 123.9 LBS | TEMPERATURE: 97.9 F | OXYGEN SATURATION: 94 % | DIASTOLIC BLOOD PRESSURE: 59 MMHG | HEIGHT: 59 IN | SYSTOLIC BLOOD PRESSURE: 120 MMHG | HEART RATE: 66 BPM

## 2021-02-09 LAB
ALBUMIN SERPL BCP-MCNC: 1.9 G/DL (ref 3.5–5)
ALP SERPL-CCNC: 445 U/L (ref 46–116)
ALT SERPL W P-5'-P-CCNC: 547 U/L (ref 12–78)
ANION GAP SERPL CALCULATED.3IONS-SCNC: 0 MMOL/L (ref 4–13)
AST SERPL W P-5'-P-CCNC: 822 U/L (ref 5–45)
BASOPHILS # BLD AUTO: 0.09 THOUSANDS/ΜL (ref 0–0.1)
BASOPHILS NFR BLD AUTO: 1 % (ref 0–1)
BILIRUB SERPL-MCNC: 2.3 MG/DL (ref 0.2–1)
BUN SERPL-MCNC: 20 MG/DL (ref 5–25)
CALCIUM ALBUM COR SERPL-MCNC: 10.2 MG/DL (ref 8.3–10.1)
CALCIUM SERPL-MCNC: 8.5 MG/DL (ref 8.3–10.1)
CHLORIDE SERPL-SCNC: 104 MMOL/L (ref 100–108)
CO2 SERPL-SCNC: 30 MMOL/L (ref 21–32)
CREAT SERPL-MCNC: 0.86 MG/DL (ref 0.6–1.3)
EOSINOPHIL # BLD AUTO: 0.33 THOUSAND/ΜL (ref 0–0.61)
EOSINOPHIL NFR BLD AUTO: 4 % (ref 0–6)
ERYTHROCYTE [DISTWIDTH] IN BLOOD BY AUTOMATED COUNT: 18.9 % (ref 11.6–15.1)
FLUAV RNA RESP QL NAA+PROBE: NEGATIVE
FLUBV RNA RESP QL NAA+PROBE: NEGATIVE
GFR SERPL CREATININE-BSD FRML MDRD: 59 ML/MIN/1.73SQ M
GLUCOSE SERPL-MCNC: 97 MG/DL (ref 65–140)
HCT VFR BLD AUTO: 35.6 % (ref 34.8–46.1)
HGB BLD-MCNC: 11.7 G/DL (ref 11.5–15.4)
IMM GRANULOCYTES # BLD AUTO: 0.06 THOUSAND/UL (ref 0–0.2)
IMM GRANULOCYTES NFR BLD AUTO: 1 % (ref 0–2)
LYMPHOCYTES # BLD AUTO: 2.43 THOUSANDS/ΜL (ref 0.6–4.47)
LYMPHOCYTES NFR BLD AUTO: 32 % (ref 14–44)
MCH RBC QN AUTO: 29.8 PG (ref 26.8–34.3)
MCHC RBC AUTO-ENTMCNC: 32.9 G/DL (ref 31.4–37.4)
MCV RBC AUTO: 91 FL (ref 82–98)
MONOCYTES # BLD AUTO: 1.01 THOUSAND/ΜL (ref 0.17–1.22)
MONOCYTES NFR BLD AUTO: 13 % (ref 4–12)
NEUTROPHILS # BLD AUTO: 3.61 THOUSANDS/ΜL (ref 1.85–7.62)
NEUTS SEG NFR BLD AUTO: 49 % (ref 43–75)
NRBC BLD AUTO-RTO: 0 /100 WBCS
PLATELET # BLD AUTO: 300 THOUSANDS/UL (ref 149–390)
PMV BLD AUTO: 11.4 FL (ref 8.9–12.7)
POTASSIUM SERPL-SCNC: 4.4 MMOL/L (ref 3.5–5.3)
PROT SERPL-MCNC: 8.2 G/DL (ref 6.4–8.2)
RBC # BLD AUTO: 3.92 MILLION/UL (ref 3.81–5.12)
RSV RNA RESP QL NAA+PROBE: NEGATIVE
SARS-COV-2 RNA RESP QL NAA+PROBE: NEGATIVE
SODIUM SERPL-SCNC: 134 MMOL/L (ref 136–145)
WBC # BLD AUTO: 7.53 THOUSAND/UL (ref 4.31–10.16)

## 2021-02-09 PROCEDURE — 80053 COMPREHEN METABOLIC PANEL: CPT | Performed by: INTERNAL MEDICINE

## 2021-02-09 PROCEDURE — 85025 COMPLETE CBC W/AUTO DIFF WBC: CPT | Performed by: INTERNAL MEDICINE

## 2021-02-09 PROCEDURE — 99239 HOSP IP/OBS DSCHRG MGMT >30: CPT | Performed by: INTERNAL MEDICINE

## 2021-02-09 PROCEDURE — 0241U HB NFCT DS VIR RESP RNA 4 TRGT: CPT | Performed by: INTERNAL MEDICINE

## 2021-02-09 RX ORDER — PANTOPRAZOLE SODIUM 40 MG/1
40 TABLET, DELAYED RELEASE ORAL
Start: 2021-02-09 | End: 2021-04-05 | Stop reason: CLARIF

## 2021-02-09 RX ORDER — ALBUTEROL SULFATE 90 UG/1
2 AEROSOL, METERED RESPIRATORY (INHALATION) EVERY 6 HOURS PRN
Qty: 8.5 G | Refills: 3 | Status: SHIPPED | OUTPATIENT
Start: 2021-02-09 | End: 2021-06-09

## 2021-02-09 RX ORDER — IBUPROFEN 400 MG/1
400 TABLET ORAL EVERY 6 HOURS PRN
Start: 2021-02-09 | End: 2021-06-09

## 2021-02-09 RX ADMIN — PANTOPRAZOLE SODIUM 40 MG: 40 TABLET, DELAYED RELEASE ORAL at 05:13

## 2021-02-09 RX ADMIN — HEPARIN SODIUM 5000 UNITS: 5000 INJECTION INTRAVENOUS; SUBCUTANEOUS at 08:19

## 2021-02-09 RX ADMIN — POTASSIUM CHLORIDE 20 MEQ: 1500 TABLET, EXTENDED RELEASE ORAL at 08:19

## 2021-02-09 NOTE — PROGRESS NOTES
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  A underlying malignant neoplasm could not be ruled out  Surgeon on  board, and plan is to do surgical intervention once transaminitis is improved      * Acalculous cholecystitis    · There is  mild right upper quadrant/epigastric tenderness  No nausea or vomiting, she is able to tolerate diet well  · No fever or leukocytosis   Antibiotics discontinued  · CT scan does show mildly dilated intrahepatic CBD and enhancement of extrahepatic CBD  HIDA scan normal   · GI and general surgery on board    Chronic Tylenol toxicity/transaminitis    · Present on admission, as evidenced by ALT 1122, AST 1185  · Patient does report chronic Tylenol use over last 6 months  · GI Involved - completed NAC Protocol  · LFTs have been stable       Cough    Improving  Continue to monitor, patient denies shortness of breath      Anxiety  Assessment & Plan  · Chronic - she is very nervous and anxious regarding her admission, workup, and possibility of cancer  · Continue mirtazapine     Elevated blood pressure reading    · No medications, stable and being monitored     Macular degeneration  Assessment & Plan  · Also has glaucoma; continue all 3 eyedrops from home     Hyperlipidemia  Assessment & Plan  · Hold statins given elevated transaminases      Esophageal reflux  Assessment & Plan  · Stable, no complaints  · Not on any medications at this time    Disposition-  on board  Insurance information and referrals sent to [facility]  Patient is anxious about her placement  Nurse has been requested to arrange face time with family        VTE Pharmacologic Prophylaxis:   Pharmacologic: Heparin  Mechanical VTE Prophylaxis in Place: Yes    Current Length of Stay: 12 day(s)    Current Patient Status: Inpatient     Disposition-  on board  Insurance information and referrals sent to [facility]  Code Status: Level 3 - DNAR and DNI      Subjective: Today I saw and examined the patient at bedside  Patient breathing on room air  She denied chest pain, shortness of breath  Vital signs stable  No acute event occurred overnight  Objective:     Vitals:   Temp (24hrs), Av °F (37 2 °C), Min:98 7 °F (37 1 °C), Max:99 2 °F (37 3 °C)    Temp:  [98 7 °F (37 1 °C)-99 2 °F (37 3 °C)] 98 7 °F (37 1 °C)  HR:  [68-72] 72  Resp:  [17-22] 22  BP: (108-137)/(56-77) 137/77  SpO2:  [91 %-95 %] 95 %  Body mass index is 25 02 kg/m²  Input and Output Summary (last 24 hours): Intake/Output Summary (Last 24 hours) at 2021 0956  Last data filed at 2021 0501  Gross per 24 hour   Intake 0 ml   Output 450 ml   Net -450 ml       Physical Exam:     Physical Exam  Constitutional:       Appearance: Normal appearance  She is obese  She is not toxic-appearing  HENT:      Head: Normocephalic and atraumatic  Cardiovascular:      Rate and Rhythm: Normal rate and regular rhythm  Pulses: Normal pulses  Heart sounds: Normal heart sounds  Pulmonary:      Effort: Pulmonary effort is normal       Breath sounds: Normal breath sounds  Abdominal:      General: Bowel sounds are normal       Palpations: Abdomen is soft  Tenderness: There is abdominal tenderness  Musculoskeletal: Normal range of motion  Right lower leg: No edema  Left lower leg: No edema  Skin:     General: Skin is warm  Neurological:      General: No focal deficit present  Mental Status: She is alert and oriented to person, place, and time             Additional Data:     Labs:    Results from last 7 days   Lab Units 21  0509   WBC Thousand/uL 7 53   HEMOGLOBIN g/dL 11 7   HEMATOCRIT % 35 6   PLATELETS Thousands/uL 300   NEUTROS PCT % 49   LYMPHS PCT % 32   MONOS PCT % 13*   EOS PCT % 4     Results from last 7 days   Lab Units 02/09/21  0509   POTASSIUM mmol/L 4 4   CHLORIDE mmol/L 104   CO2 mmol/L 30   BUN mg/dL 20   CREATININE mg/dL 0 86   CALCIUM mg/dL 8 5   ALK PHOS U/L 445*   ALT U/L 547*   AST U/L 822*           * I Have Reviewed All Lab Data Listed Above  * Additional Pertinent Lab Tests Reviewed: Misael 66 Admission Reviewed      Recent Cultures (last 7 days):           Last 24 Hours Medication List:   Current Facility-Administered Medications   Medication Dose Route Frequency Provider Last Rate    albuterol  2 5 mg Nebulization Q6H PRN Stephani Diop MD      Brinzolamide-Brimonidine  1 drop Both Eyes BID Stephani Diop MD      heparin (porcine)  5,000 Units Subcutaneous Q12H [facility] Beverly Crawford DO      ibuprofen  400 mg Oral Q6H PRN Stephani Diop MD      Latanoprostene Bunod  1 drop Both Eyes HS Stephani Diop MD      mirtazapine  15 mg Oral HS Stephani Diop MD      Netarsudil Dimesylate  1 drop Both Eyes HS Stephani Diop MD      ondansetron  4 mg Intravenous Q6H PRN Beverly Crawford DO      pantoprazole  40 mg Oral Early Morning Stephani Diop MD      potassium chloride  20 mEq Oral BID Stephani Diop MD          Today, Patient Was Seen By: Abbi Koch MD    ** Please Note: This note has been constructed using a voice recognition system   **

## 2021-02-09 NOTE — DISCHARGE INSTRUCTIONS
Follow-up with your primary care doctor in 1 week  Get blood work(CMP) in 1 week  You have been diagnosed with mass in her colon, you need to follow-up with oncology surgeon

## 2021-02-09 NOTE — CASE MANAGEMENT
Cm reviewed all scripts and the pt has auth to go to [address] [place] [address]      CM informed FRANCISCA, Nic, nursing, the pt dtr Eduardo Cheema and the pt    CM called [facility] and transport will be at 3:15 or 3:30 today    CM requested a new COVID test for the pt

## 2021-02-09 NOTE — DISCHARGE SUMMARY
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  A underlying malignant neoplasm could not be ruled out  Surgeon on  board, and plan is to do surgical intervention once transaminitis is improved  ·  Need outpatient follow-up with oncological surgeon     * Acalculous cholecystitis     · There is  mild right upper quadrant/epigastric tenderness  No nausea or vomiting, she is able to tolerate diet well  · No fever or leukocytosis   Antibiotics discontinued  · CT scan does show mildly dilated intrahepatic CBD and enhancement of extrahepatic CBD  HIDA scan normal   · GI and general surgery on board     Chronic Tylenol toxicity/transaminitis     · Present on admission, as evidenced by ALT 1122, AST 1185  · Patient does report chronic Tylenol use over last 6 months  · GI Involved - completed NAC Protocol  · LFTs have been stable        Cough     Improving  Continue to monitor, patient denies shortness of breath      Anxiety  Assessment & Plan  · Chronic - she is very nervous and anxious regarding her admission, workup, and possibility of cancer  · Continue mirtazapine     Elevated blood pressure reading     · No medications, stable and being monitored     Macular degeneration  Assessment & Plan  · Also has glaucoma; continue all 3 eyedrops from home     Hyperlipidemia  Assessment & Plan  · Hold statins given elevated transaminases      Esophageal reflux  Assessment & Plan  · Stable, no complaints  · Not on any medications at this time      Discharging Resident Physician: Maribell Khalil MD  Attending: Pao Bobby MD  PCP: Pao Bobby MD  Admission Date: 1/28/2021  Discharge Date: 02/09/21    Disposition:     SNF    Reason for Admission: Abdominal pain, acalculous cholecystitis, incidental colon mass, chronic Tylenol toxicity    Consultations During Hospital Stay:   Gastroenterology, General surgery    Significant Findings / Test Results:     ·  colon mass    Incidental Findings:    colon mass    Outpatient Tests Requested:   CMP in 1 week    Hospital Course:     Annalisa Martinez is a 80 y o  female patient who originally presented to the hospital on 1/28/2021 due to abdominal pain  Was found that patient is suffering from  Acalculous cholecystitis  Patient was treated initially with antibiotics  Gastroenterology was involved  CT of the abdomen show mildly dilated intrahepatic common bile duct,  Enhancement of extrahepatic bile duct, HIDA scan was normal   No surgical intervention was recommended  On CT scan of the abdomen it was found that patient has soft mass in ascending colon 3 9 inch x 2 1 centimeter  General surgery is involved  Colonoscopy was done and biopsies of the large mass in the proximal colon show a villous adenoma with a foci of high-grade dysplasia  Plan is to do surgical intervention once patient is stable  Patient also had elevated LFT secondary to chronic Tylenol toxicity  Patient completed NAC protocol  Statins are on hold because of elevated liver functions  Today during my encounter patient denied nausea vomiting, abdominal pain  Vital signs stable  No acute event occurred overnight  Patient is clinically and hemodynamically stable for discharge to nursing home  She need to follow-up with   Primary care doctor,general surgeon and Gastroenterology as outpatient  Condition at Discharge: good     Discharge Day Visit / Exam:     Subjective: Today I saw and examined the patient bedside  Patient denied any complaints    She denied nausea or vomiting  Vital signs stable  No acute event occurred overnight  Vitals: Blood Pressure: 137/77 (02/09/21 0820)  Pulse: 72 (02/09/21 0820)  Temperature: 98 7 °F (37 1 °C) (02/09/21 0820)  Temp Source: Oral (02/09/21 0820)  Respirations: 22 (02/09/21 0820)  Height: 4' 11" (149 9 cm) (01/28/21 2218)  Weight - Scale: 56 2 kg (123 lb 14 4 oz) (01/28/21 1728)  SpO2: 95 % (02/09/21 0820)  Exam:   Physical Exam  Constitutional:       Appearance: Normal appearance  HENT:      Head: Normocephalic and atraumatic  Neck:      Musculoskeletal: Normal range of motion and neck supple  Cardiovascular:      Rate and Rhythm: Normal rate and regular rhythm  Pulses: Normal pulses  Heart sounds: Normal heart sounds  Pulmonary:      Effort: Pulmonary effort is normal       Breath sounds: Normal breath sounds  Abdominal:      General: Bowel sounds are normal       Palpations: Abdomen is soft  Tenderness: There is abdominal tenderness  Musculoskeletal: Normal range of motion  Right lower leg: No edema  Left lower leg: No edema  Skin:     General: Skin is warm  Coloration: Skin is not pale  Findings: No erythema  Neurological:      General: No focal deficit present  Mental Status: She is alert and oriented to person, place, and time  Mental status is at baseline  Psychiatric:         Mood and Affect: Mood normal          Behavior: Behavior normal              Discharge instructions/Information to patient and family:   See after visit summary for information provided to patient and family  Provisions for Follow-Up Care:  See after visit summary for information related to follow-up care and any pertinent home health orders  Time spent  I have spent 45 minutes with Patient  today in which greater than 50% of this time was spent in counseling/coordination of care regarding Impressions            Discharge Medications:  See after visit summary for reconciled discharge medications provided to patient and family        ** Please Note: This note has been constructed using a voice recognition system **

## 2021-02-09 NOTE — TRANSPORTATION MEDICAL NECESSITY
Section I - General Information    Name of Patient: Bruna Linares                 : 5/15/1929    Medicare #: [id number]  Transport Date: 21 (PCS is valid for round trips on this date and for all repetitive trips in the 60-day range as noted below )  Origin: [place]: [place]  Is the pt's stay covered under Medicare Part A (PPS/DRG)   []     Closest appropriate facility? If no, why is transport to more distant facility required? Yes  If hospice pt, is this transport related to pt's terminal illness? No       Section II - Medical Necessity Questionnaire  Ambulance transportation is medically necessary only if other means of transport are contraindicated or would be potentially harmful to the patient  To meet this requirement, the patient must either be "bed confined" or suffer from a condition such that transport by means other than ambulance is contraindicated by the patient's condition  The following questions must be answered by the medical professional signing below for this form to be valid:    1)  Describe the MEDICAL CONDITION (physical and/or mental) of this patient AT [address] [place] [address] that requires the patient to be transported in an ambulance and why transport by other means is contraindicated by the patient's condition:Decreased Strength, decreased endurance, impaired balance, Decreased Mobility, impaired vision    2) Is the patient "bed confined" as defined below? No  To be "be confined" the patient must satisfy all three of the following conditions: (1) unable to get up from bed without Assistance; AND (2) unable to ambulate; AND (3) unable to sit in a chair or wheelchair  3) Can this patient safely be transported by car or wheelchair van (i e , seated during transport without a medical attendant or monitoring)?    No    4) In addition to completing questions 1-3 above, please check any of the following conditions that apply*:   *Note: supporting documentation for any boxes checked must be maintained in the patient's medical records  If hosp-hosp transfer, describe services needed at 2nd facility not available at 1st facility? Moderate/severe pain on movement   Medical attendant required   Unable to tolerate seated position for time needed to transport       Section III - Signature of Physician or Healthcare Professional  I certify that the above information is true and correct based on my evaluation of this patient, and represent that the patient requires transport by ambulance and that other forms of transport are contraindicated  I understand that this information will be used by the Centers for Medicare and Medicaid Services (CMS) to support the determination of medical necessity for ambulance services, and I represent that I have personal knowledge of the patient's condition at time of transport  []  If this box is checked, I also certify that the patient is physically or mentally incapable of signing the ambulance service's claim and that the institution with which I am affiliated has furnished care, services, or assistance to the patient  My signature below is made on behalf of the patient pursuant to 42 CFR §424 36(b)(4)  In accordance with 42 CFR §424 37, the specific reason(s) that the patient is physically or mentally incapable of signing the claim form is as follows: N/A      Signature of Physician* or Healthcare Professional______________________________________________________________  Signature Date 02/09/21 (For scheduled repetitive transports, this form is not valid for transports performed more than 60 days after this date)    Printed Name & Credentials of Physician or Healthcare Professional (MD, DO, RN, etc )____URI Padilla  *Form must be signed by patient's attending physician for scheduled, repetitive transports   For non-repetitive, unscheduled ambulance transports, if unable to obtain the signature of the attending physician, any of the following may sign (choose appropriate option below)  [] Physician Assistant []  Clinical Nurse Specialist []  Registered Nurse  []  Nurse Practitioner  [x] Discharge Planner

## 2021-02-09 NOTE — PLAN OF CARE
Problem: Potential for Falls  Goal: Patient will remain free of falls  Description: INTERVENTIONS:  - Assess patient frequently for physical needs  -  Identify cognitive and physical deficits and behaviors that affect risk of falls    -  [place] fall precautions as indicated by assessment   - Educate patient/family on patient safety including physical limitations  - Instruct patient to call for assistance with activity based on assessment  - Modify environment to reduce risk of injury  - Consider OT/PT consult to assist with strengthening/mobility  Outcome: Progressing     Problem: PAIN - ADULT  Goal: Verbalizes/displays adequate comfort level or baseline comfort level  Description: Interventions:  - Encourage patient to monitor pain and request assistance  - Assess pain using appropriate pain scale  - Administer analgesics based on type and severity of pain and evaluate response  - Implement non-pharmacological measures as appropriate and evaluate response  - Consider cultural and social influences on pain and pain management  - Notify physician/advanced practitioner if interventions unsuccessful or patient reports new pain  Outcome: Progressing     Problem: INFECTION - ADULT  Goal: Absence or prevention of progression during hospitalization  Description: INTERVENTIONS:  - Assess and monitor for signs and symptoms of infection  - Monitor lab/diagnostic results  - Monitor all insertion sites, i e  indwelling lines, tubes, and drains  - Monitor endotracheal if appropriate and nasal secretions for changes in amount and color  - Elaine appropriate cooling/warming therapies per order  - Administer medications as ordered  - Instruct and encourage patient and family to use good hand hygiene technique  - Identify and instruct in appropriate isolation precautions for identified infection/condition  Outcome: Progressing  Goal: Absence of fever/infection during neutropenic period  Description: INTERVENTIONS:  - Monitor WBC    Outcome: Progressing     Problem: SAFETY ADULT  Goal: Patient will remain free of falls  Description: INTERVENTIONS:  - Assess patient frequently for physical needs  -  Identify cognitive and physical deficits and behaviors that affect risk of falls    -  [place] fall precautions as indicated by assessment   - Educate patient/family on patient safety including physical limitations  - Instruct patient to call for assistance with activity based on assessment  - Modify environment to reduce risk of injury  - Consider OT/PT consult to assist with strengthening/mobility  Outcome: Progressing  Goal: Maintain or return to baseline ADL function  Description: INTERVENTIONS:  -  Assess patient's ability to carry out ADLs; assess patient's baseline for ADL function and identify physical deficits which impact ability to perform ADLs (bathing, care of mouth/teeth, toileting, grooming, dressing, etc )  - Assess/evaluate cause of self-care deficits   - Assess range of motion  - Assess patient's mobility; develop plan if impaired  - Assess patient's need for assistive devices and provide as appropriate  - Encourage maximum independence but intervene and supervise when necessary  - Involve family in performance of ADLs  - Assess for home care needs following discharge   - Consider OT consult to assist with ADL evaluation and planning for discharge  - Provide patient education as appropriate  Outcome: Progressing  Goal: Maintain or return mobility status to optimal level  Description: INTERVENTIONS:  - Assess patient's baseline mobility status (ambulation, transfers, stairs, etc )    - Identify cognitive and physical deficits and behaviors that affect mobility  - Identify mobility aids required to assist with transfers and/or ambulation (gait belt, sit-to-stand, lift, walker, cane, etc )  - [place] fall precautions as indicated by assessment  - Record patient progress and toleration of activity level on Mobility SBAR; progress patient to next Phase/Stage  - Instruct patient to call for assistance with activity based on assessment  - Consider rehabilitation consult to assist with strengthening/weightbearing, etc   Outcome: Progressing     Problem: DISCHARGE PLANNING  Goal: Discharge to home or other facility with appropriate resources  Description: INTERVENTIONS:  - Identify barriers to discharge w/patient and caregiver  - Arrange for needed discharge resources and transportation as appropriate  - Identify discharge learning needs (meds, wound care, etc )  - Arrange for interpretive services to assist at discharge as needed  - Refer to Case Management Department for coordinating discharge planning if the patient needs post-hospital services based on physician/advanced practitioner order or complex needs related to functional status, cognitive ability, or social support system  Outcome: Progressing     Problem: Nutrition/Hydration-ADULT  Goal: Nutrient/Hydration intake appropriate for improving, restoring or maintaining nutritional needs  Description: Monitor and assess patient's nutrition/hydration status for malnutrition  Collaborate with interdisciplinary team and initiate plan and interventions as ordered  Monitor patient's weight and dietary intake as ordered or per policy  Utilize nutrition screening tool and intervene as necessary  Determine patient's food preferences and provide high-protein, high-caloric foods as appropriate       INTERVENTIONS:  - Monitor oral intake, urinary output, labs, and treatment plans  - Assess nutrition and hydration status and recommend course of action  - Evaluate amount of meals eaten  - Assist patient with eating if necessary   - Allow adequate time for meals  - Recommend/ encourage appropriate diets, oral nutritional supplements, and vitamin/mineral supplements  - Order, calculate, and assess calorie counts as needed  - Recommend, monitor, and adjust tube feedings and TPN/PPN based on assessed needs  - Assess need for intravenous fluids  - Provide specific nutrition/hydration education as appropriate  - Include patient/family/caregiver in decisions related to nutrition  Outcome: Progressing     Problem: Knowledge Deficit  Goal: Patient/family/caregiver demonstrates understanding of disease process, treatment plan, medications, and discharge instructions  Description: Complete learning assessment and assess knowledge base    Interventions:  - Provide teaching at level of understanding  - Provide teaching via preferred learning methods  Outcome: Progressing

## 2021-02-10 ENCOUNTER — TRANSITIONAL CARE MANAGEMENT (OUTPATIENT)
Dept: INTERNAL MEDICINE CLINIC | Facility: CLINIC | Age: 86
End: 2021-02-10

## 2021-02-10 NOTE — UTILIZATION REVIEW
Notification of Discharge  This is a Notification of Discharge from our facility [address]  Please be advised that this patient has been discharge from our facility  Below you will find the admission and discharge date and time including the patients disposition  PRESENTATION DATE: 1/28/2021  5:17 PM  OBS ADMISSION DATE:   IP ADMISSION DATE: 1/28/21 1959   DISCHARGE DATE: 2/9/2021  4:02 PM  DISPOSITION: Non [facility] Non [facility]/S[facility]   Admission Orders listed below:  Admission Orders (From admission, onward)     Ordered        01/28/21 1959  Inpatient Admission  Once                   Please contact the UR Department if additional information is required to close this patient's authorization/case  [address] [facility] Utilization Review Department  Main: [phone] x carefully listen to the prompts  All voicemails are confidential   [email or web address]  Send all requests for admission clinical reviews, approved or denied determinations and any other requests to dedicated fax number below belonging to the campus where the patient is receiving treatment   List of dedicated fax numbers:  [address] DENIALS (Administrative/Medical Necessity) [phone]   [address] (Maternity/NICU/Pediatrics) [phone]   Jarod Tompkins [phone]   [facility] Serafin [phone]   Mack Carcamo [phone]   Evelyn Melendez [facility] [address] [facility] [phone]   [address] [facility] [phone]   [address] [facility] [address]  [address] [facility] [address] [facility] [phone]

## 2021-02-18 ENCOUNTER — TELEPHONE (OUTPATIENT)
Dept: INTERNAL MEDICINE CLINIC | Facility: CLINIC | Age: 86
End: 2021-02-18

## 2021-02-18 NOTE — TELEPHONE ENCOUNTER
Patient's daughter Jhonatan Reis calling pt was discharge from [address] [place] [address] on 2/17/2021      has some questions for Dr Dunia Delgado, She was giving ibuuprofen to take as needed for pain ,  wants to make sure is ok for her to take and if her enzyme levels are down?

## 2021-02-23 ENCOUNTER — APPOINTMENT (OUTPATIENT)
Dept: LAB | Facility: HOSPITAL | Age: 86
End: 2021-02-23
Payer: COMMERCIAL

## 2021-02-23 ENCOUNTER — OFFICE VISIT (OUTPATIENT)
Dept: INTERNAL MEDICINE CLINIC | Facility: CLINIC | Age: 86
End: 2021-02-23
Payer: COMMERCIAL

## 2021-02-23 VITALS
HEART RATE: 80 BPM | SYSTOLIC BLOOD PRESSURE: 122 MMHG | DIASTOLIC BLOOD PRESSURE: 80 MMHG | BODY MASS INDEX: 24.11 KG/M2 | WEIGHT: 119.6 LBS | HEIGHT: 59 IN | RESPIRATION RATE: 16 BRPM | TEMPERATURE: 97.2 F

## 2021-02-23 DIAGNOSIS — R74.01 TRANSAMINITIS: ICD-10-CM

## 2021-02-23 DIAGNOSIS — M81.0 AGE-RELATED OSTEOPOROSIS WITHOUT CURRENT PATHOLOGICAL FRACTURE: ICD-10-CM

## 2021-02-23 DIAGNOSIS — R74.01 TRANSAMINITIS: Primary | ICD-10-CM

## 2021-02-23 LAB
ALBUMIN SERPL BCP-MCNC: 2.2 G/DL (ref 3.5–5)
ALP SERPL-CCNC: 357 U/L (ref 46–116)
ALT SERPL W P-5'-P-CCNC: 839 U/L (ref 12–78)
ANION GAP SERPL CALCULATED.3IONS-SCNC: 2 MMOL/L (ref 4–13)
AST SERPL W P-5'-P-CCNC: 1229 U/L (ref 5–45)
BASOPHILS # BLD AUTO: 0.08 THOUSANDS/ΜL (ref 0–0.1)
BASOPHILS NFR BLD AUTO: 1 % (ref 0–1)
BILIRUB SERPL-MCNC: 3 MG/DL (ref 0.2–1)
BUN SERPL-MCNC: 22 MG/DL (ref 5–25)
CALCIUM ALBUM COR SERPL-MCNC: 10.5 MG/DL (ref 8.3–10.1)
CALCIUM SERPL-MCNC: 9.1 MG/DL (ref 8.3–10.1)
CHLORIDE SERPL-SCNC: 102 MMOL/L (ref 100–108)
CO2 SERPL-SCNC: 31 MMOL/L (ref 21–32)
CREAT SERPL-MCNC: 1 MG/DL (ref 0.6–1.3)
EOSINOPHIL # BLD AUTO: 0.23 THOUSAND/ΜL (ref 0–0.61)
EOSINOPHIL NFR BLD AUTO: 3 % (ref 0–6)
ERYTHROCYTE [DISTWIDTH] IN BLOOD BY AUTOMATED COUNT: 19.7 % (ref 11.6–15.1)
GFR SERPL CREATININE-BSD FRML MDRD: 49 ML/MIN/1.73SQ M
GLUCOSE SERPL-MCNC: 115 MG/DL (ref 65–140)
HCT VFR BLD AUTO: 42.2 % (ref 34.8–46.1)
HGB BLD-MCNC: 13.7 G/DL (ref 11.5–15.4)
IMM GRANULOCYTES # BLD AUTO: 0.09 THOUSAND/UL (ref 0–0.2)
IMM GRANULOCYTES NFR BLD AUTO: 1 % (ref 0–2)
INR PPP: 1.24 (ref 0.84–1.19)
LYMPHOCYTES # BLD AUTO: 2.23 THOUSANDS/ΜL (ref 0.6–4.47)
LYMPHOCYTES NFR BLD AUTO: 27 % (ref 14–44)
MCH RBC QN AUTO: 30.9 PG (ref 26.8–34.3)
MCHC RBC AUTO-ENTMCNC: 32.5 G/DL (ref 31.4–37.4)
MCV RBC AUTO: 95 FL (ref 82–98)
MONOCYTES # BLD AUTO: 0.88 THOUSAND/ΜL (ref 0.17–1.22)
MONOCYTES NFR BLD AUTO: 11 % (ref 4–12)
NEUTROPHILS # BLD AUTO: 4.65 THOUSANDS/ΜL (ref 1.85–7.62)
NEUTS SEG NFR BLD AUTO: 57 % (ref 43–75)
NRBC BLD AUTO-RTO: 0 /100 WBCS
PLATELET # BLD AUTO: 352 THOUSANDS/UL (ref 149–390)
PMV BLD AUTO: 9.9 FL (ref 8.9–12.7)
POTASSIUM SERPL-SCNC: 4.5 MMOL/L (ref 3.5–5.3)
PROT SERPL-MCNC: 10.7 G/DL (ref 6.4–8.2)
PROTHROMBIN TIME: 15 SECONDS (ref 11.6–14.5)
RBC # BLD AUTO: 4.44 MILLION/UL (ref 3.81–5.12)
SODIUM SERPL-SCNC: 135 MMOL/L (ref 136–145)
WBC # BLD AUTO: 8.16 THOUSAND/UL (ref 4.31–10.16)

## 2021-02-23 PROCEDURE — 85610 PROTHROMBIN TIME: CPT

## 2021-02-23 PROCEDURE — 99214 OFFICE O/P EST MOD 30 MIN: CPT | Performed by: INTERNAL MEDICINE

## 2021-02-23 PROCEDURE — 80053 COMPREHEN METABOLIC PANEL: CPT

## 2021-02-23 PROCEDURE — 36415 COLL VENOUS BLD VENIPUNCTURE: CPT

## 2021-02-23 PROCEDURE — 85025 COMPLETE CBC W/AUTO DIFF WBC: CPT

## 2021-02-23 NOTE — PROGRESS NOTES
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  A underlying malignant neoplasm could not be ruled out  Plan is to do surgical intervention once transaminitis is improved  Palliative care on board  Chronic Tylenol toxicity/transaminitis-  Patient is asymptomatic, denied abdominal pain, nausea vomiting  AST is 1279, , alk phosphate is 428  AST and ALT trended up as compared to last result  Will repeat LFTs and PT/INR today  If the numbers are trending up, will involve Gastroenterology stat for urgent intervention  Generalized weakness-patient looks dehydrated, she mentioned her urine is very dark  She is told to drink water to maintain her hydration  Cough-stable    Anxiety-she is not taking mirtazapine because that makes her too drowsy  History of hypertension-blood pressure in office is stable    Macular degeneration-continue eyedrops    Hyperlipidemia-statins discontinued because of transaminitis       There are no diagnoses linked to this encounter  Patient ID: Pamela Kim is a 80 y o  female  72-year-old female with history of mass in the colon is in our office for follow-up after being released from acute rehab  Patient was discharge from [facility] on 01/28/2021 with new diagnosis of mass in her colon and chronic Tylenol toxicity  She was discharged to rehab [place] where she received physical therapy and was discharged to home on 02/17/2021  She is complaining of generalized weakness  Denied any nausea, abdominal pain or vomiting  She is able to tolerate diet  She is not taking any medications at this point  Plan was to repeat the liver function test done once LFTs are improved , surgical intervention would be considered        The following portions of the patient's history were reviewed and updated as appropriate: allergies, current medications, past family history, past medical history, past social history, past surgical history and problem list     Review of Systems   Constitutional: Positive for fatigue  Negative for chills and fever  HENT: Negative for congestion and postnasal drip  Respiratory: Negative for cough, chest tightness and shortness of breath  Cardiovascular: Negative for chest pain, palpitations and leg swelling  Gastrointestinal: Negative for abdominal pain, diarrhea, nausea and vomiting  Genitourinary: Negative for dysuria and urgency  Dark urine   Musculoskeletal: Negative for back pain  Neurological: Negative for dizziness and light-headedness  Psychiatric/Behavioral: The patient is nervous/anxious  Objective:      /80 (BP Location: Left arm, Patient Position: Sitting)   Pulse 80   Temp (!) 97 2 °F (36 2 °C) (Tympanic)   Resp 16   Ht 4' 11" (1 499 m)   Wt 54 3 kg (119 lb 9 6 oz)   BMI 24 16 kg/m²          Physical Exam  Constitutional:       Appearance: Normal appearance  HENT:      Head: Normocephalic and atraumatic  Nose: Nose normal    Neck:      Musculoskeletal: Normal range of motion and neck supple  Cardiovascular:      Rate and Rhythm: Normal rate and regular rhythm  Pulses: Normal pulses  Heart sounds: Normal heart sounds  Pulmonary:      Effort: Pulmonary effort is normal       Breath sounds: Normal breath sounds  Abdominal:      General: Bowel sounds are normal       Palpations: Abdomen is soft  Tenderness: There is abdominal tenderness (upper abdomen)  Musculoskeletal: Normal range of motion  Right lower leg: No edema  Left lower leg: No edema  Skin:     General: Skin is warm  Neurological:      General: No focal deficit present  Mental Status: She is alert and oriented to person, place, and time     Psychiatric:         Mood and Affect: Mood normal          Behavior: Behavior normal

## 2021-02-24 ENCOUNTER — TELEPHONE (OUTPATIENT)
Dept: GASTROENTEROLOGY | Facility: CLINIC | Age: 86
End: 2021-02-24

## 2021-02-24 ENCOUNTER — TELEPHONE (OUTPATIENT)
Dept: INTERNAL MEDICINE CLINIC | Facility: CLINIC | Age: 86
End: 2021-02-24

## 2021-02-24 DIAGNOSIS — R74.01 TRANSAMINITIS: Primary | ICD-10-CM

## 2021-02-24 NOTE — TELEPHONE ENCOUNTER
Pt daughter Deepthi Chatman returned our call and was informed of Dr Lucero message  Deepthi Compa asked what happens if pt enzymes continue to spike? She also want to know should pt get the Covid Vaccine?

## 2021-02-24 NOTE — TELEPHONE ENCOUNTER
If we cannot find the answer with the blood work we may need to pursue a biopsy of the liver  I would hold off on getting the vaccine until we have a better handle on the  Liver situation

## 2021-02-24 NOTE — TELEPHONE ENCOUNTER
Please let her know that the lab work is about the same, I spoke to Dr Thomas Arroyo, who recommended ordering 2 more blood tests  Please have her do them as soon as she can so that hopefully the results will be back prior to her visit with GI next week

## 2021-02-25 ENCOUNTER — LAB (OUTPATIENT)
Dept: LAB | Facility: MEDICAL CENTER | Age: 86
End: 2021-02-25
Payer: COMMERCIAL

## 2021-02-25 DIAGNOSIS — R74.01 TRANSAMINITIS: ICD-10-CM

## 2021-02-25 LAB — IGG SERPL-MCNC: 5430 MG/DL (ref 700–1600)

## 2021-02-25 PROCEDURE — 36415 COLL VENOUS BLD VENIPUNCTURE: CPT

## 2021-02-25 PROCEDURE — 86376 MICROSOMAL ANTIBODY EACH: CPT

## 2021-02-25 PROCEDURE — 82784 ASSAY IGA/IGD/IGG/IGM EACH: CPT

## 2021-02-26 ENCOUNTER — TELEPHONE (OUTPATIENT)
Dept: GASTROENTEROLOGY | Facility: CLINIC | Age: 86
End: 2021-02-26

## 2021-02-26 ENCOUNTER — OFFICE VISIT (OUTPATIENT)
Dept: GASTROENTEROLOGY | Facility: CLINIC | Age: 86
End: 2021-02-26
Payer: COMMERCIAL

## 2021-02-26 VITALS
SYSTOLIC BLOOD PRESSURE: 120 MMHG | DIASTOLIC BLOOD PRESSURE: 64 MMHG | HEIGHT: 59 IN | WEIGHT: 118.4 LBS | BODY MASS INDEX: 23.87 KG/M2 | HEART RATE: 60 BPM

## 2021-02-26 DIAGNOSIS — C18.2 MALIGNANT NEOPLASM OF ASCENDING COLON (HCC): ICD-10-CM

## 2021-02-26 DIAGNOSIS — R74.01 TRANSAMINITIS: Primary | ICD-10-CM

## 2021-02-26 LAB — LKM-1 AB SER-ACNC: <20.1 UNITS (ref 0–20)

## 2021-02-26 PROCEDURE — 99214 OFFICE O/P EST MOD 30 MIN: CPT | Performed by: PHYSICIAN ASSISTANT

## 2021-02-26 NOTE — H&P (VIEW-ONLY)
[facility] - Outpatient Follow-up Note  Nikolai Peoples [place]uda 80 y o  female MRN: [id number]  Encounter: [id number]          ASSESSMENT AND PLAN:      1  Transaminitis  Progressive  ? Autoimmune given elevated JACINTA of 1:160 and elevated IgG at 5000  She is s/p inpatient treatment for tylenol toxicity with NAC protocol  She needs a liver biopsy ASAP  She is agreeable to this    2  Malignant neoplasm of ascending colon ([address] )  Diagnosed 2/2/2021  She wishes to proceed with intervention such as hemicolectomy  This is on hold until her transaminitis is resolved    ______________________________________________________________________    SUBJECTIVE:    58-year-old female presents for hospital follow-up  She was hospitalized at [facility] in January and February of this year  Her initial presentation was for abdominal pain with elevated liver enzymes thought secondary to acalculous cholecystitis  CT scan also suggested the presence of mass in the colon  After further evaluation with CT and MRI there seemed to be no evidence of choledocholithiasis  She was found have an elevated Tylenol level  She reported taking 2 Tylenol  1-2 times for several weeks due to joint pains  She was started on N-acetylcysteine protocol  Throughout the hospitalization her liver enzymes seem to be improving  She underwent a colonoscopy on February 2nd due to the  Abnormality noted on CT scan  A mass in the ascending colon was noted  Biopsy showed it to be a villous adenoma with high-grade dysplasia  Jude Carlton Despite her advanced age her and her children decided that given her impressively good health and independent status she would proceed with hemicolectomy  Unfortunately, she followed up with her family doctor in the office earlier this week and had labs repeated which showed progression in her elevated liver enzymes    Her AST was noted to be 1229, ALT was 839, total bilirubin was 3 and alkaline phosphatase was 357  In the hospital workup to include testing for viral, autoimmune and genetic causes of liver disease were negative except for an elevated JACINTA at  160  Smooth muscle antibody was negative  After Dr Cheryl Woodruff as noted a progression in the elevation of liver enzymes he ordered a liver kidney microsomal antibody as well as a quantitative IgG  The quantitative IgG came back quite elevated at 5000  She has no history of any other autoimmune disorders  She adamantly denies any alcohol abuse  She has not been taking any Tylenol since discharge from the hospital   She has no history of IV drug use or intranasal cocaine use  She denies any family history of liver disease  She denies any current abdominal pain, nausea or vomiting  She is having regular bowel movements without blood  She denies any fevers or chills  She is currently living with her daughter  Her daughter denies any episodes of overt confusion  Although she has been more fatigued than normal she is able to function during the day  REVIEW OF SYSTEMS IS OTHERWISE NEGATIVE        Historical Information   Past Medical History:   Diagnosis Date    Hyperlipidemia     Osteoporosis     Sciatica      Past Surgical History:   Procedure Laterality Date     SECTION      COLONOSCOPY       Social History   Social History     Substance and Sexual Activity   Alcohol Use Not Currently     Social History     Substance and Sexual Activity   Drug Use No     Social History     Tobacco Use   Smoking Status Never Smoker   Smokeless Tobacco Never Used     Family History   Problem Relation Age of Onset    Diabetes type II Mother     Pancreatic cancer Father     Colon cancer Sister        Meds/Allergies       Current Outpatient Medications:     albuterol (ProAir HFA) 90 mcg/act inhaler    cetirizine (ZyrTEC) 10 mg tablet    Cholecalciferol ([employer] VITAMIN D) 2000 units CAPS    ibuprofen (MOTRIN) 400 mg tablet    Multiple Vitamins-Minerals (CENTRUM SILVER 50+WOMEN) TABS    pantoprazole (PROTONIX) 40 mg tablet    RHOPRESSA 0 02 % SOLN    SIMBRINZA 1-0 2 % SUSP    VYZULTA 0 024 % SOLN    No Known Allergies        Objective     Blood pressure 120/64, pulse 60, height 4' 11" (1 499 m), weight 53 7 kg (118 lb 6 4 oz), not currently breastfeeding  Body mass index is 23 91 kg/m²  PHYSICAL EXAM:      General Appearance:   Alert, cooperative, no distress   HEENT:   Normocephalic, atraumatic, anicteric      Neck:  Supple, symmetrical, trachea midline   Lungs:   Clear to auscultation bilaterally; no rales, rhonchi or wheezing; respirations unlabored    Heart[de-identified]   Regular rate and rhythm; no murmur, rub, or gallop  Abdomen:   Soft, non-tender, non-distended; normal bowel sounds; no masses, no organomegaly    Genitalia:   Deferred    Rectal:   Deferred    Extremities:  No cyanosis, clubbing or edema    Pulses:  2+ and symmetric    Skin:  No jaundice, rashes, or lesions    Lymph nodes:  No palpable cervical lymphadenopathy        Lab Results:   No visits with results within 1 Day(s) from this visit  Latest known visit with results is:   Lab on 02/25/2021   Component Date Value    IGG 02/25/2021 5,430 0*         Radiology Results:   Xr Chest Portable    Result Date: 2/1/2021  Narrative: CHEST INDICATION:   Dyspnea  COMPARISON:  CT abdomen pelvis 1/28/2021 EXAM PERFORMED/VIEWS:  XR CHEST PORTABLE FINDINGS: Cardiomediastinal silhouette appears unremarkable  Mild bibasilar atelectasis  Left hemidiaphragm is mildly elevated  No pleural effusion or pneumothorax  Osseous structures appear within normal limits for patient age  Impression: Mild bibasilar atelectasis  Workstation performed: [id number]     Ct Chest W Contrast    Result Date: 2/2/2021  Narrative: CT CHEST WITH IV CONTRAST INDICATION:   Colon cancer, non-metastatic, initial workup colon cancer  COMPARISON:  MRI from 1/29/2021; CT from 1/28/2021 TECHNIQUE: CT examination of the chest was performed   Axial, sagittal, and coronal 2D reformatted images were created from the source data and submitted for interpretation  Radiation dose length product (DLP) for this visit:  180 mGy-cm   This examination, like all CT scans performed in the [facility], was performed utilizing techniques to minimize radiation dose exposure, including the use of iterative reconstruction and automated exposure control  IV Contrast:  85 mL of iohexol (OMNIPAQUE) FINDINGS: LUNGS:  2 mm pleural-based nodule is noted in the right upper lobe on image 21  Right upper lobe 2 mm nodule image 26  One or 2 other small nodules less than 4 mm are identified  Bandlike consolidation adjacent to effusions is most likely related to compressive atelectasis  There Is no tracheal or endobronchial lesion  PLEURA:  Bilateral small pleural effusions  HEART/GREAT VESSELS:  Coronary calcification is present    MEDIASTINUM AND MANSI:  Unremarkable  CHEST WALL AND LOWER NECK:   Unremarkable  VISUALIZED STRUCTURES IN THE UPPER ABDOMEN:  Unremarkable  OSSEOUS STRUCTURES:  There is mild wedging of T8 and T9 T9 is unchanged as compared to the abdomen CT  Mild wedging of T5 is also identified  Impression: Small effusions  A few small nodules are identified  Based on current Fleischner Society 2017 Guidelines on incidental pulmonary nodule, patients with a known malignancy are at increased risk of metastasis and should receive initial three month follow-up chest CT  Workstation performed: [id number]     Nm Hepatobiliary W Rx    Result Date: 1/31/2021  Narrative: HEPATOBILIARY SCAN INDICATION:  Abnormal CT and MRI appearance of the gallbladder  Further evaluation for cholecystitis COMPARISON:  CT 1/28/2021, MRI 1/29/2021 TECHNIQUE:   Following the intravenous administration of 4 5 mCi Tc-99m labeled mebrofenin, dynamic abdominal images were obtained over a 60 minute time period  Images were performed in AP projection     FINDINGS: There is normal visualization of the liver parenchyma, common bile duct and small bowel  Initially, the gallbladder did not visualize  Therefore, 2 0 mg of morphine was administered intravenously x1 in the nuclear medicine department  Following morphine administration, prompt visualization of the gallbladder occurred     Impression: Following administration of IV morphine, prompt visualization of the gallbladder, indicating a patent cystic duct  No scintigraphic evidence of acute cholecystitis Workstation performed: [id number]     Mri Abdomen W Wo Contrast And Mrcp    Result Date: 1/30/2021  Narrative: MRI OF THE ABDOMEN WITH AND WITHOUT CONTRAST WITH MRCP INDICATION:  Assess CBD with possible a calculus cholecystitis on CT scan  COMPARISON: CT scan from yesterday  TECHNIQUE:  The following pulse sequences were obtained:  Coronal and axial T2 with TE of 90 and 180 respectively, axial T2 with fat saturation, axial FIESTA fat-sat, axial T1-weighted in-and-out-of phase, axial DWI/ADC, pre-contrast axial T1 with fat saturation, post-contrast dynamic axial T1 with fat saturation at 20, 70, and 180 seconds, followed by coronal and 7 minute delayed axial T1 with fat saturation  3D MRCP images were obtained with radial thick slabs and projections  3D rendering was performed from the acquisition scanner  IV Contrast:  5 mL of Gadobutrol injection (SINGLE-DOSE) FINDINGS: LOWER CHEST:   Unremarkable  LIVER: Normal in size and configuration  No suspicious mass  The hepatic veins and portal veins are patent  BILE DUCTS:  No intrahepatic or extrahepatic bile duct dilation  Common bile duct is normal in caliber  No choledocholithiasis, biliary stricture or suspicious mass  GALLBLADDER:  Gallbladder wall thickening  No stones  PANCREAS:  Normal  No main pancreatic ductal dilation  ADRENAL GLANDS:  Normal  SPLEEN:  Normal  KIDNEYS/PROXIMAL URETERS:  No hydroureteronephrosis  No suspicious renal mass   BOWEL:   Enhancing focus in the ascending colon reidentified suspicious for neoplasm measuring approximately 3 5 cm on image 14/323  PERITONEUM/RETROPERITONEUM:  No ascites  LYMPH NODES:  Upper abdominal adenopathy including a portacaval node measuring 1 2 cm  VASCULAR STRUCTURES:  No aneurysm  ABDOMINAL WALL:  Unremarkable  OSSEOUS STRUCTURES:  No suspicious osseous lesion  Impression: Gallbladder has an abnormal appearance with wall thickening and/or pericholecystic fluid  No stones identified  Possibility is raised of acalculous cholecystitis  CBD is normal in diameter  There is mild nonspecific enhancement of the distal CBD which may represent mild cholangitis  Upper abdominal portacaval lymphadenopathy measuring 1 2 cm  Enhancing focus in the ascending colon wall reidentified suspicious for neoplasm measuring approximately 3 5 cm on image 14/323  Workstation performed: [id number]     Ct Abdomen Pelvis With Contrast    Result Date: 1/28/2021  Narrative: CT ABDOMEN AND PELVIS WITH IV CONTRAST INDICATION:   Abdominal pain, acute, nonlocalized pain,l transaminitis  COMPARISON:  None  TECHNIQUE:  CT examination of the abdomen and pelvis was performed  Axial, sagittal, and coronal 2D reformatted images were created from the source data and submitted for interpretation  Radiation dose length product (DLP) for this visit:  538 65 mGy-cm   This examination, like all CT scans performed in the [facility], was performed utilizing techniques to minimize radiation dose exposure, including the use of iterative  reconstruction and automated exposure control  IV Contrast:  100 mL of iohexol (OMNIPAQUE) Enteric Contrast:  Enteric contrast was not administered  FINDINGS: ABDOMEN LOWER CHEST:  No clinically significant abnormality identified in the visualized lower chest  LIVER/BILIARY TREE:  Mild intrahepatic ductal dilatation  Extrahepatic CBD with mild mucosal enhancement  GALLBLADDER:  No calcified stones identified    Minimal pericholecystic fluid  SPLEEN:  Unremarkable  PANCREAS:  Unremarkable  ADRENAL GLANDS:  Unremarkable  KIDNEYS/URETERS:  Unremarkable  No hydronephrosis  STOMACH AND BOWEL:  Colonic diverticulosis  Soft tissue mass in the ascending colon just above the ileocecal valve consistent with neoplasm measuring 3 9 x 2 1 cm  Multiple small mesenteric nodes are noted adjacent to the mass seen on image 601/65  APPENDIX:  No findings to suggest appendicitis  ABDOMINOPELVIC CAVITY:  No ascites  No pneumoperitoneum  Small mesenteric nodes in the right lower quadrant adjacent to the colon mass  VESSELS:  Unremarkable for patient's age  PELVIS REPRODUCTIVE ORGANS:  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  URINARY BLADDER:  Unremarkable  ABDOMINAL WALL/INGUINAL REGIONS:  Unremarkable  OSSEOUS STRUCTURES:  No acute fracture or destructive osseous lesion  Advanced degenerative changes of the lumbar spine  Impression: Soft tissue mass in the ascending colon just above the ileocecal valve consistent with neoplasm measuring 3 9 x 2 1 cm  Multiple small mesenteric nodes are noted adjacent to the mass seen on image 601/65  Recommend colonoscopy  Pericholecystic fluid  No calcified gallstones  Possible acalculous cholecystitis  Correlate clinically  Consider ultrasound if any clinical ambiguity  Mild nonspecific enhancement of the extrahepatic CBD  This can be seen in the setting of cholangitis  The study was marked in [facility] for immediate notification   Workstation performed: [id number]

## 2021-02-26 NOTE — TELEPHONE ENCOUNTER
David Aquino - patient's daughter, Dilan Hawk, called  Called Central Scheduling - told by CS that there were no orders in system  Spoke with Ezequiel Fairly, there is an order in system, I did not know if it was ordered by Davidlisa Aquino or not  It was not  Told Shruthi to call [facility], it is in there  Dilan Kenn called [facility] and they told her that she needed to call [facility] on Monday to schedule  Dilan Hawk a little upset    Told her we will call her first thing Monday Kaiden

## 2021-02-26 NOTE — PROGRESS NOTES
[facility] - Outpatient Follow-up Note  Crys Lindsay Cuda 80 y o  female MRN: [id number]  Encounter: [id number]          ASSESSMENT AND PLAN:      1  Transaminitis  Progressive  ? Autoimmune given elevated JACINTA of 1:160 and elevated IgG at 5000  She is s/p inpatient treatment for tylenol toxicity with NAC protocol  She needs a liver biopsy ASAP  She is agreeable to this    2  Malignant neoplasm of ascending colon ([address] )  Diagnosed 2/2/2021  She wishes to proceed with intervention such as hemicolectomy  This is on hold until her transaminitis is resolved    ______________________________________________________________________    SUBJECTIVE:    80-year-old female presents for hospital follow-up  She was hospitalized at [facility] in January and February of this year  Her initial presentation was for abdominal pain with elevated liver enzymes thought secondary to acalculous cholecystitis  CT scan also suggested the presence of mass in the colon  After further evaluation with CT and MRI there seemed to be no evidence of choledocholithiasis  She was found have an elevated Tylenol level  She reported taking 2 Tylenol  1-2 times for several weeks due to joint pains  She was started on N-acetylcysteine protocol  Throughout the hospitalization her liver enzymes seem to be improving  She underwent a colonoscopy on February 2nd due to the  Abnormality noted on CT scan  A mass in the ascending colon was noted  Biopsy showed it to be a villous adenoma with high-grade dysplasia  Grace Fox Despite her advanced age her and her children decided that given her impressively good health and independent status she would proceed with hemicolectomy  Unfortunately, she followed up with her family doctor in the office earlier this week and had labs repeated which showed progression in her elevated liver enzymes    Her AST was noted to be 1229, ALT was 839, total bilirubin was 3 and alkaline phosphatase was 357  In the hospital workup to include testing for viral, autoimmune and genetic causes of liver disease were negative except for an elevated JACINTA at  160  Smooth muscle antibody was negative  After Dr Pallavi Baptiste as noted a progression in the elevation of liver enzymes he ordered a liver kidney microsomal antibody as well as a quantitative IgG  The quantitative IgG came back quite elevated at 5000  She has no history of any other autoimmune disorders  She adamantly denies any alcohol abuse  She has not been taking any Tylenol since discharge from the hospital   She has no history of IV drug use or intranasal cocaine use  She denies any family history of liver disease  She denies any current abdominal pain, nausea or vomiting  She is having regular bowel movements without blood  She denies any fevers or chills  She is currently living with her daughter  Her daughter denies any episodes of overt confusion  Although she has been more fatigued than normal she is able to function during the day  REVIEW OF SYSTEMS IS OTHERWISE NEGATIVE        Historical Information   Past Medical History:   Diagnosis Date    Hyperlipidemia     Osteoporosis     Sciatica      Past Surgical History:   Procedure Laterality Date     SECTION      COLONOSCOPY       Social History   Social History     Substance and Sexual Activity   Alcohol Use Not Currently     Social History     Substance and Sexual Activity   Drug Use No     Social History     Tobacco Use   Smoking Status Never Smoker   Smokeless Tobacco Never Used     Family History   Problem Relation Age of Onset    Diabetes type II Mother     Pancreatic cancer Father     Colon cancer Sister        Meds/Allergies       Current Outpatient Medications:     albuterol (ProAir HFA) 90 mcg/act inhaler    cetirizine (ZyrTEC) 10 mg tablet    Cholecalciferol ([employer] VITAMIN D) 2000 units CAPS    ibuprofen (MOTRIN) 400 mg tablet    Multiple Vitamins-Minerals (CENTRUM SILVER 50+WOMEN) TABS    pantoprazole (PROTONIX) 40 mg tablet    RHOPRESSA 0 02 % SOLN    SIMBRINZA 1-0 2 % SUSP    VYZULTA 0 024 % SOLN    No Known Allergies        Objective     Blood pressure 120/64, pulse 60, height 4' 11" (1 499 m), weight 53 7 kg (118 lb 6 4 oz), not currently breastfeeding  Body mass index is 23 91 kg/m²  PHYSICAL EXAM:      General Appearance:   Alert, cooperative, no distress   HEENT:   Normocephalic, atraumatic, anicteric      Neck:  Supple, symmetrical, trachea midline   Lungs:   Clear to auscultation bilaterally; no rales, rhonchi or wheezing; respirations unlabored    Heart[de-identified]   Regular rate and rhythm; no murmur, rub, or gallop  Abdomen:   Soft, non-tender, non-distended; normal bowel sounds; no masses, no organomegaly    Genitalia:   Deferred    Rectal:   Deferred    Extremities:  No cyanosis, clubbing or edema    Pulses:  2+ and symmetric    Skin:  No jaundice, rashes, or lesions    Lymph nodes:  No palpable cervical lymphadenopathy        Lab Results:   No visits with results within 1 Day(s) from this visit  Latest known visit with results is:   Lab on 02/25/2021   Component Date Value    IGG 02/25/2021 5,430 0*         Radiology Results:   Xr Chest Portable    Result Date: 2/1/2021  Narrative: CHEST INDICATION:   Dyspnea  COMPARISON:  CT abdomen pelvis 1/28/2021 EXAM PERFORMED/VIEWS:  XR CHEST PORTABLE FINDINGS: Cardiomediastinal silhouette appears unremarkable  Mild bibasilar atelectasis  Left hemidiaphragm is mildly elevated  No pleural effusion or pneumothorax  Osseous structures appear within normal limits for patient age  Impression: Mild bibasilar atelectasis  Workstation performed: [id number]     Ct Chest W Contrast    Result Date: 2/2/2021  Narrative: CT CHEST WITH IV CONTRAST INDICATION:   Colon cancer, non-metastatic, initial workup colon cancer  COMPARISON:  MRI from 1/29/2021; CT from 1/28/2021 TECHNIQUE: CT examination of the chest was performed   Axial, sagittal, and coronal 2D reformatted images were created from the source data and submitted for interpretation  Radiation dose length product (DLP) for this visit:  180 mGy-cm   This examination, like all CT scans performed in the [facility], was performed utilizing techniques to minimize radiation dose exposure, including the use of iterative reconstruction and automated exposure control  IV Contrast:  85 mL of iohexol (OMNIPAQUE) FINDINGS: LUNGS:  2 mm pleural-based nodule is noted in the right upper lobe on image 21  Right upper lobe 2 mm nodule image 26  One or 2 other small nodules less than 4 mm are identified  Bandlike consolidation adjacent to effusions is most likely related to compressive atelectasis  There Is no tracheal or endobronchial lesion  PLEURA:  Bilateral small pleural effusions  HEART/GREAT VESSELS:  Coronary calcification is present    MEDIASTINUM AND MANSI:  Unremarkable  CHEST WALL AND LOWER NECK:   Unremarkable  VISUALIZED STRUCTURES IN THE UPPER ABDOMEN:  Unremarkable  OSSEOUS STRUCTURES:  There is mild wedging of T8 and T9 T9 is unchanged as compared to the abdomen CT  Mild wedging of T5 is also identified  Impression: Small effusions  A few small nodules are identified  Based on current Fleischner Society 2017 Guidelines on incidental pulmonary nodule, patients with a known malignancy are at increased risk of metastasis and should receive initial three month follow-up chest CT  Workstation performed: [id number]     Nm Hepatobiliary W Rx    Result Date: 1/31/2021  Narrative: HEPATOBILIARY SCAN INDICATION:  Abnormal CT and MRI appearance of the gallbladder  Further evaluation for cholecystitis COMPARISON:  CT 1/28/2021, MRI 1/29/2021 TECHNIQUE:   Following the intravenous administration of 4 5 mCi Tc-99m labeled mebrofenin, dynamic abdominal images were obtained over a 60 minute time period  Images were performed in AP projection     FINDINGS: There is normal visualization of the liver parenchyma, common bile duct and small bowel  Initially, the gallbladder did not visualize  Therefore, 2 0 mg of morphine was administered intravenously x1 in the nuclear medicine department  Following morphine administration, prompt visualization of the gallbladder occurred     Impression: Following administration of IV morphine, prompt visualization of the gallbladder, indicating a patent cystic duct  No scintigraphic evidence of acute cholecystitis Workstation performed: [id number]     Mri Abdomen W Wo Contrast And Mrcp    Result Date: 1/30/2021  Narrative: MRI OF THE ABDOMEN WITH AND WITHOUT CONTRAST WITH MRCP INDICATION:  Assess CBD with possible a calculus cholecystitis on CT scan  COMPARISON: CT scan from yesterday  TECHNIQUE:  The following pulse sequences were obtained:  Coronal and axial T2 with TE of 90 and 180 respectively, axial T2 with fat saturation, axial FIESTA fat-sat, axial T1-weighted in-and-out-of phase, axial DWI/ADC, pre-contrast axial T1 with fat saturation, post-contrast dynamic axial T1 with fat saturation at 20, 70, and 180 seconds, followed by coronal and 7 minute delayed axial T1 with fat saturation  3D MRCP images were obtained with radial thick slabs and projections  3D rendering was performed from the acquisition scanner  IV Contrast:  5 mL of Gadobutrol injection (SINGLE-DOSE) FINDINGS: LOWER CHEST:   Unremarkable  LIVER: Normal in size and configuration  No suspicious mass  The hepatic veins and portal veins are patent  BILE DUCTS:  No intrahepatic or extrahepatic bile duct dilation  Common bile duct is normal in caliber  No choledocholithiasis, biliary stricture or suspicious mass  GALLBLADDER:  Gallbladder wall thickening  No stones  PANCREAS:  Normal  No main pancreatic ductal dilation  ADRENAL GLANDS:  Normal  SPLEEN:  Normal  KIDNEYS/PROXIMAL URETERS:  No hydroureteronephrosis  No suspicious renal mass   BOWEL:   Enhancing focus in the ascending colon reidentified suspicious for neoplasm measuring approximately 3 5 cm on image 14/323  PERITONEUM/RETROPERITONEUM:  No ascites  LYMPH NODES:  Upper abdominal adenopathy including a portacaval node measuring 1 2 cm  VASCULAR STRUCTURES:  No aneurysm  ABDOMINAL WALL:  Unremarkable  OSSEOUS STRUCTURES:  No suspicious osseous lesion  Impression: Gallbladder has an abnormal appearance with wall thickening and/or pericholecystic fluid  No stones identified  Possibility is raised of acalculous cholecystitis  CBD is normal in diameter  There is mild nonspecific enhancement of the distal CBD which may represent mild cholangitis  Upper abdominal portacaval lymphadenopathy measuring 1 2 cm  Enhancing focus in the ascending colon wall reidentified suspicious for neoplasm measuring approximately 3 5 cm on image 14/323  Workstation performed: [id number]     Ct Abdomen Pelvis With Contrast    Result Date: 1/28/2021  Narrative: CT ABDOMEN AND PELVIS WITH IV CONTRAST INDICATION:   Abdominal pain, acute, nonlocalized pain,l transaminitis  COMPARISON:  None  TECHNIQUE:  CT examination of the abdomen and pelvis was performed  Axial, sagittal, and coronal 2D reformatted images were created from the source data and submitted for interpretation  Radiation dose length product (DLP) for this visit:  538 65 mGy-cm   This examination, like all CT scans performed in the [facility], was performed utilizing techniques to minimize radiation dose exposure, including the use of iterative  reconstruction and automated exposure control  IV Contrast:  100 mL of iohexol (OMNIPAQUE) Enteric Contrast:  Enteric contrast was not administered  FINDINGS: ABDOMEN LOWER CHEST:  No clinically significant abnormality identified in the visualized lower chest  LIVER/BILIARY TREE:  Mild intrahepatic ductal dilatation  Extrahepatic CBD with mild mucosal enhancement  GALLBLADDER:  No calcified stones identified    Minimal pericholecystic fluid  SPLEEN:  Unremarkable  PANCREAS:  Unremarkable  ADRENAL GLANDS:  Unremarkable  KIDNEYS/URETERS:  Unremarkable  No hydronephrosis  STOMACH AND BOWEL:  Colonic diverticulosis  Soft tissue mass in the ascending colon just above the ileocecal valve consistent with neoplasm measuring 3 9 x 2 1 cm  Multiple small mesenteric nodes are noted adjacent to the mass seen on image 601/65  APPENDIX:  No findings to suggest appendicitis  ABDOMINOPELVIC CAVITY:  No ascites  No pneumoperitoneum  Small mesenteric nodes in the right lower quadrant adjacent to the colon mass  VESSELS:  Unremarkable for patient's age  PELVIS REPRODUCTIVE ORGANS:  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  URINARY BLADDER:  Unremarkable  ABDOMINAL WALL/INGUINAL REGIONS:  Unremarkable  OSSEOUS STRUCTURES:  No acute fracture or destructive osseous lesion  Advanced degenerative changes of the lumbar spine  Impression: Soft tissue mass in the ascending colon just above the ileocecal valve consistent with neoplasm measuring 3 9 x 2 1 cm  Multiple small mesenteric nodes are noted adjacent to the mass seen on image 601/65  Recommend colonoscopy  Pericholecystic fluid  No calcified gallstones  Possible acalculous cholecystitis  Correlate clinically  Consider ultrasound if any clinical ambiguity  Mild nonspecific enhancement of the extrahepatic CBD  This can be seen in the setting of cholangitis  The study was marked in [facility] for immediate notification   Workstation performed: [id number]

## 2021-03-01 NOTE — TELEPHONE ENCOUNTER
On 2/26/21 called IR to schedule pt's liver biopsy  Told it will take 2 days for doctor to review before pt can be scheduled  IR will contact pt directly with date and time for precedure  Called pt's daughter Mike Magallanes and advised her of the above  Told her I will f/up on the status later today and will get back to her

## 2021-03-03 ENCOUNTER — TELEPHONE (OUTPATIENT)
Dept: INTERVENTIONAL RADIOLOGY/VASCULAR | Facility: HOSPITAL | Age: 86
End: 2021-03-03

## 2021-03-05 ENCOUNTER — HOSPITAL ENCOUNTER (OUTPATIENT)
Dept: INTERVENTIONAL RADIOLOGY/VASCULAR | Facility: HOSPITAL | Age: 86
Discharge: HOME | End: 2021-03-05
Attending: RADIOLOGY
Payer: COMMERCIAL

## 2021-03-05 VITALS
DIASTOLIC BLOOD PRESSURE: 58 MMHG | RESPIRATION RATE: 20 BRPM | SYSTOLIC BLOOD PRESSURE: 119 MMHG | TEMPERATURE: 98 F | HEIGHT: 59 IN | HEART RATE: 75 BPM | BODY MASS INDEX: 23.42 KG/M2 | OXYGEN SATURATION: 92 % | WEIGHT: 116.18 LBS

## 2021-03-05 DIAGNOSIS — R74.01 TRANSAMINITIS: ICD-10-CM

## 2021-03-05 LAB
INR PPP: 1.14 (ref 0.84–1.19)
PROTHROMBIN TIME: 14.8 SECONDS (ref 11.6–14.5)

## 2021-03-05 PROCEDURE — 88307 TISSUE EXAM BY PATHOLOGIST: CPT | Performed by: PATHOLOGY

## 2021-03-05 PROCEDURE — 47000 NEEDLE BIOPSY OF LIVER PERQ: CPT | Performed by: RADIOLOGY

## 2021-03-05 PROCEDURE — 88341 IMHCHEM/IMCYTCHM EA ADD ANTB: CPT | Performed by: PATHOLOGY

## 2021-03-05 PROCEDURE — 76942 ECHO GUIDE FOR BIOPSY: CPT | Performed by: RADIOLOGY

## 2021-03-05 PROCEDURE — 88313 SPECIAL STAINS GROUP 2: CPT | Performed by: PATHOLOGY

## 2021-03-05 PROCEDURE — 85610 PROTHROMBIN TIME: CPT | Performed by: RADIOLOGY

## 2021-03-05 PROCEDURE — 88342 IMHCHEM/IMCYTCHM 1ST ANTB: CPT | Performed by: PATHOLOGY

## 2021-03-05 PROCEDURE — 99152 MOD SED SAME PHYS/QHP 5/>YRS: CPT | Performed by: RADIOLOGY

## 2021-03-05 PROCEDURE — 99152 MOD SED SAME PHYS/QHP 5/>YRS: CPT

## 2021-03-05 PROCEDURE — 47000 NEEDLE BIOPSY OF LIVER PERQ: CPT

## 2021-03-05 PROCEDURE — 76942 ECHO GUIDE FOR BIOPSY: CPT

## 2021-03-05 RX ORDER — MIDAZOLAM HYDROCHLORIDE 2 MG/2ML
INJECTION, SOLUTION INTRAMUSCULAR; INTRAVENOUS CODE/TRAUMA/SEDATION MEDICATION
Status: DISCONTINUED | OUTPATIENT
Start: 2021-03-05 | End: 2021-03-09 | Stop reason: HOSPADM

## 2021-03-05 RX ORDER — LIDOCAINE WITH 8.4% SOD BICARB 0.9%(10ML)
SYRINGE (ML) INJECTION CODE/TRAUMA/SEDATION MEDICATION
Status: DISCONTINUED | OUTPATIENT
Start: 2021-03-05 | End: 2021-03-09 | Stop reason: HOSPADM

## 2021-03-05 RX ORDER — FENTANYL CITRATE 50 UG/ML
INJECTION, SOLUTION INTRAMUSCULAR; INTRAVENOUS CODE/TRAUMA/SEDATION MEDICATION
Status: DISCONTINUED | OUTPATIENT
Start: 2021-03-05 | End: 2021-03-09 | Stop reason: HOSPADM

## 2021-03-05 RX ADMIN — MIDAZOLAM HYDROCHLORIDE 1 MG: 1 INJECTION, SOLUTION INTRAMUSCULAR; INTRAVENOUS at 10:25

## 2021-03-05 RX ADMIN — FENTANYL CITRATE 25 MCG: 50 INJECTION, SOLUTION INTRAMUSCULAR; INTRAVENOUS at 10:25

## 2021-03-05 RX ADMIN — Medication 2 ML: at 10:28

## 2021-03-05 RX ADMIN — FENTANYL CITRATE 25 MCG: 50 INJECTION, SOLUTION INTRAMUSCULAR; INTRAVENOUS at 10:31

## 2021-03-05 NOTE — DISCHARGE INSTRUCTIONS
Percutaneous Liver Biopsy   AMBULATORY CARE:   What you need to know about a percutaneous liver biopsy (PLB):  A PLB is a procedure to remove a sample of tissue from your liver  The sample can be sent to a lab and tested for liver disease, cancer, or infection  How to prepare for a PLB:   · Your healthcare provider will talk to you about how to prepare for your procedure  You may need to have your blood tested before the procedure  A blood test can check how well your blood clots  Your blood needs to clot correctly to prevent heavy bleeding during the procedure  · The provider may tell you not to eat or drink anything for 6 to 8 hours before your procedure  He or she will tell you what medicines to take or not take on the day of your procedure  You may need to stop taking blood thinners, NSAIDs, or aspirin 3 to 7 days before your procedure  · Before the procedure you may be given an antibiotic to help prevent a bacterial infection  Tell your healthcare provider if you have ever had an allergic reaction to an antibiotic  During the procedure you may be given contrast liquid to help your liver show up better in pictures  Tell your healthcare provider if you have ever had an allergic reaction to contrast liquid  · Arrange for someone to drive you home and stay with you for 24 hours after the procedure  This person can help you around the house and watch you for any problems  What will happen during a PLB:   · You may be given local anesthesia to numb the biopsy site  With local anesthesia, you may still feel pressure or pushing during the procedure, but you should not feel any pain  You may also be given IV sedation to help you feel relaxed during the procedure  If you do feel pain during the procedure, your healthcare provider will give you IV pain medicine  · Your healthcare provider will make a small cut in your right upper abdomen  He or she will insert a needle through the cut   Ultrasound or CT pictures may be used to help your provider find the correct area  When the needle is in the correct position, your healthcare provider will take a sample of your liver  The needle will be removed and pressure will be applied to the biopsy site to stop bleeding  A bandage will be placed over your biopsy site  What will happen after a PLB:  Healthcare providers will monitor your vital signs and check for bleeding at your biopsy site  You will need to lie on your right side for 1 to 2 hours  Do not get out of bed until your healthcare provider says it is okay  You may be able to go home in 4 to 6 hours, or you may need to spend a night in the hospital  You may have pain and bruising at the biopsy site  You may also have pain in your right shoulder  These symptoms should get better in 48 to 72 hours  Risks of a PLB:  You may bleed more than expected or get an infection  The biopsy needle may make a hole in your lung, gallbladder, or kidney  You may need other procedures to treat these problems  Call 911 for any of the following:   · You have trouble breathing  · You cannot stop the bleeding from your biopsy site even after you hold firm pressure for 10 minutes  Seek care immediately if:   · Blood soaks through your bandage  · You have severe pain in your abdomen  · Your abdomen is larger than usual and feels hard  Contact your healthcare provider if:   · You have a fever or chills  · Your pain does not get better after you take pain medicine  · Your biopsy site is red, swollen, or draining pus  · You have nausea or are vomiting  · Your skin is itchy, swollen, or you have a rash  · You have questions or concerns about your condition or care  Medicines: You may need any of the following:  · Acetaminophen  decreases pain and fever  It is available without a doctor's order  Ask how much to take and how often to take it  Follow directions   Read the labels of all other medicines you are using to see if they also contain acetaminophen, or ask your doctor or pharmacist  Acetaminophen can cause liver damage if not taken correctly  Do not use more than 4 grams (4,000 milligrams) total of acetaminophen in one day  · Prescription pain medicine  may be given  Ask your healthcare provider how to take this medicine safely  Some prescription pain medicines contain acetaminophen  Do not take other medicines that contain acetaminophen without talking to your healthcare provider  Too much acetaminophen may cause liver damage  Prescription pain medicine may cause constipation  Ask your healthcare provider how to prevent or treat constipation  · Take your medicine as directed  Contact your healthcare provider if you think your medicine is not helping or if you have side effects  Tell him or her if you are allergic to any medicine  Keep a list of the medicines, vitamins, and herbs you take  Include the amounts, and when and why you take them  Bring the list or the pill bottles to follow-up visits  Carry your medicine list with you in case of an emergency  Care for your biopsy site as directed:  Ask your healthcare provider when your biopsy site can get wet  Carefully wash around the biopsy site with soap and water  It is okay to let soap and water run over the biopsy site  Dry the area and put on new, clean bandages as directed  Change your bandages when they get wet or dirty  Instead, you may be told to leave the biopsy site open to air  Self-care:   · Apply ice  on your biopsy site for 15 to 20 minutes every hour or as directed  Use an ice pack, or put crushed ice in a plastic bag  Cover it with a towel  Ice helps prevent tissue damage and decreases swelling and pain  · Rest as directed  Do not play sports, exercise, or lift anything heavier than 5 pounds for up to 1 week  · Drink liquids as directed  Liquids will help flush the contrast liquid out of your body   Ask how much liquid to drink each day and which liquids are best for you  · Apply firm, steady pressure if bleeding occurs  A small amount of bleeding from your biopsy is possible  Apply pressure with a clean gauze or towel for 5 to 10 minutes  Call 911 if bleeding becomes heavy or does not stop  · Ask your healthcare provider when to take your blood thinner or antiplatelet medicine  You may need to wait 24 to 72 hours to take your medicine  This will prevent bleeding  Follow up with your healthcare provider as directed:  Write down your questions so you remember to ask them during your visits  © Copyright [address] [place] [address] Information is for End User's use only and may not be sold, redistributed or otherwise used for commercial purposes  All illustrations and images included in CareNotes® are the copyrighted property of [employer]  or [address] [place], [place] [address]kristyn Barnes   The above information is an  only  It is not intended as medical advice for individual conditions or treatments  Talk to your doctor, nurse or pharmacist before following any medical regimen to see if it is safe and effective for you

## 2021-03-05 NOTE — INTERVAL H&P NOTE
Patient arrived to IR for image guided liver biopsy    The procedure and risks were discussed with the patient  All questions were answered  Informed consent was obtained  H & P reviewed after examining the patient and I find no changes in the patient condition since the H & P has been written  /71   Pulse 81   Temp 98 1 °F (36 7 °C) (Temporal)   Resp 18   Ht 4' 11" (1 499 m)   Wt 52 7 kg (116 lb 2 9 oz)   SpO2 98%   Breastfeeding No   BMI 23 47 kg/m²     Patient re-evaluated   Accept as history and physical     Janine Khalil, DO/March 5, 2021/9:36 AM

## 2021-03-10 ENCOUNTER — TELEPHONE (OUTPATIENT)
Dept: GASTROENTEROLOGY | Facility: CLINIC | Age: 86
End: 2021-03-10

## 2021-03-10 ENCOUNTER — TELEPHONE (OUTPATIENT)
Dept: INTERNAL MEDICINE CLINIC | Facility: CLINIC | Age: 86
End: 2021-03-10

## 2021-03-10 ENCOUNTER — TELEPHONE (OUTPATIENT)
Dept: LAB | Facility: HOSPITAL | Age: 86
End: 2021-03-10

## 2021-03-10 DIAGNOSIS — K75.4 AUTOIMMUNE HEPATITIS (HCC): Primary | ICD-10-CM

## 2021-03-10 RX ORDER — PREDNISONE 10 MG/1
50 TABLET ORAL DAILY
Qty: 140 TABLET | Refills: 0 | Status: SHIPPED | OUTPATIENT
Start: 2021-03-10 | End: 2021-03-29

## 2021-03-10 NOTE — TELEPHONE ENCOUNTER
Spoke with patients daughter  Patient history of transaminitis, malignant neoplasm of ascending colon    Patient c/o constipation 2 days ago and was given doculax for two days  Today she developed abdominal cramping and diarrhea  Reassured patients daughter she is most likely uncomfortable fromt he doculax, this a stimulant laxative, and to encourage plenty of fluids with electrolytes at this time  Reviewed miralax should be used PRN for constipation  Would you happen to review patients liver biopsy?

## 2021-03-10 NOTE — TELEPHONE ENCOUNTER
She canceled her appointment to see me tomorrow  She needs to follow-up with GI after her liver biopsy

## 2021-03-10 NOTE — TELEPHONE ENCOUNTER
Nessa from [facility]   # [phone]    Having discomfort in her stomach, loose bowel movement this am    Stomach is quiet hard, also had 2 days of constipation--had bowel movement yesterday it was solid,     Patient is feeling tired  Letting you know bc she has liver problems

## 2021-03-10 NOTE — TELEPHONE ENCOUNTER
Spoke to patient's daughter  Discussed the diagnosis of AIH/PBC  Will start Predisone 50mg daily and repeat labs in 2 weeks  Her daughter reports she has a hard time getting her mom out of the house to get labs - gave her the number for the mobile lab  She will have labs done now and again in 2 weeks     Consider adding imuran at that time

## 2021-03-10 NOTE — TELEPHONE ENCOUNTER
Nimesh Blush - Patient's daughter, Dheeraj Bradshaw, called  Patient  Had a liver biopsy on Friday 03/05/21  Patient has a very hard abdomin and is uncomfortable   Please call Dheeraj Bradshaw at [phone] ty

## 2021-03-15 ENCOUNTER — TELEPHONE (OUTPATIENT)
Dept: GASTROENTEROLOGY | Facility: CLINIC | Age: 86
End: 2021-03-15

## 2021-03-15 NOTE — TELEPHONE ENCOUNTER
Terrie Boas - patient['s daughter, Annika Oropeza, called  Would like to speak with Terrie Boas about the liver biopsy   Please call Annika Oropeza at [phone] ty

## 2021-03-15 NOTE — TELEPHONE ENCOUNTER
Marilu's patient - Daughter Deepthi Chatman calling to speak with Raul Orantes about the Liver biopsy in detail  Please call Deepthi Chatman back @ [phone]   Thxs

## 2021-03-17 ENCOUNTER — TELEPHONE (OUTPATIENT)
Dept: LAB | Facility: HOSPITAL | Age: 86
End: 2021-03-17

## 2021-03-17 ENCOUNTER — APPOINTMENT (OUTPATIENT)
Dept: LAB | Facility: HOSPITAL | Age: 86
End: 2021-03-17
Payer: COMMERCIAL

## 2021-03-17 DIAGNOSIS — R74.01 TRANSAMINITIS: ICD-10-CM

## 2021-03-17 LAB
ALBUMIN SERPL BCP-MCNC: 2.4 G/DL (ref 3.5–5)
ALP SERPL-CCNC: 140 U/L (ref 46–116)
ALT SERPL W P-5'-P-CCNC: 177 U/L (ref 12–78)
ANION GAP SERPL CALCULATED.3IONS-SCNC: -1 MMOL/L (ref 4–13)
AST SERPL W P-5'-P-CCNC: 130 U/L (ref 5–45)
BASOPHILS # BLD AUTO: 0.01 THOUSANDS/ΜL (ref 0–0.1)
BASOPHILS NFR BLD AUTO: 0 % (ref 0–1)
BILIRUB SERPL-MCNC: 0.98 MG/DL (ref 0.2–1)
BUN SERPL-MCNC: 31 MG/DL (ref 5–25)
CALCIUM ALBUM COR SERPL-MCNC: 10.1 MG/DL (ref 8.3–10.1)
CALCIUM SERPL-MCNC: 8.8 MG/DL (ref 8.3–10.1)
CHLORIDE SERPL-SCNC: 109 MMOL/L (ref 100–108)
CO2 SERPL-SCNC: 31 MMOL/L (ref 21–32)
CREAT SERPL-MCNC: 1.03 MG/DL (ref 0.6–1.3)
EOSINOPHIL # BLD AUTO: 0.09 THOUSAND/ΜL (ref 0–0.61)
EOSINOPHIL NFR BLD AUTO: 1 % (ref 0–6)
ERYTHROCYTE [DISTWIDTH] IN BLOOD BY AUTOMATED COUNT: 17.5 % (ref 11.6–15.1)
GFR SERPL CREATININE-BSD FRML MDRD: 48 ML/MIN/1.73SQ M
GLUCOSE P FAST SERPL-MCNC: 169 MG/DL (ref 65–99)
HCT VFR BLD AUTO: 42.2 % (ref 34.8–46.1)
HGB BLD-MCNC: 13.4 G/DL (ref 11.5–15.4)
IMM GRANULOCYTES # BLD AUTO: 0.1 THOUSAND/UL (ref 0–0.2)
IMM GRANULOCYTES NFR BLD AUTO: 1 % (ref 0–2)
INR PPP: 1.12 (ref 0.84–1.19)
LYMPHOCYTES # BLD AUTO: 2.43 THOUSANDS/ΜL (ref 0.6–4.47)
LYMPHOCYTES NFR BLD AUTO: 23 % (ref 14–44)
MCH RBC QN AUTO: 31.5 PG (ref 26.8–34.3)
MCHC RBC AUTO-ENTMCNC: 31.8 G/DL (ref 31.4–37.4)
MCV RBC AUTO: 99 FL (ref 82–98)
MONOCYTES # BLD AUTO: 0.95 THOUSAND/ΜL (ref 0.17–1.22)
MONOCYTES NFR BLD AUTO: 9 % (ref 4–12)
NEUTROPHILS # BLD AUTO: 7.04 THOUSANDS/ΜL (ref 1.85–7.62)
NEUTS SEG NFR BLD AUTO: 66 % (ref 43–75)
NRBC BLD AUTO-RTO: 0 /100 WBCS
PLATELET # BLD AUTO: 291 THOUSANDS/UL (ref 149–390)
PMV BLD AUTO: 10.8 FL (ref 8.9–12.7)
POTASSIUM SERPL-SCNC: 4.8 MMOL/L (ref 3.5–5.3)
PROT SERPL-MCNC: 8.2 G/DL (ref 6.4–8.2)
PROTHROMBIN TIME: 14.6 SECONDS (ref 11.6–14.5)
RBC # BLD AUTO: 4.25 MILLION/UL (ref 3.81–5.12)
SODIUM SERPL-SCNC: 139 MMOL/L (ref 136–145)
WBC # BLD AUTO: 10.62 THOUSAND/UL (ref 4.31–10.16)

## 2021-03-17 PROCEDURE — 80053 COMPREHEN METABOLIC PANEL: CPT

## 2021-03-17 PROCEDURE — 85610 PROTHROMBIN TIME: CPT

## 2021-03-17 PROCEDURE — 36415 COLL VENOUS BLD VENIPUNCTURE: CPT

## 2021-03-17 PROCEDURE — 85025 COMPLETE CBC W/AUTO DIFF WBC: CPT

## 2021-03-18 ENCOUNTER — TELEPHONE (OUTPATIENT)
Dept: GASTROENTEROLOGY | Facility: CLINIC | Age: 86
End: 2021-03-18

## 2021-03-18 NOTE — TELEPHONE ENCOUNTER
[facility] / Radha Henao patient- Daughter called to say patient is still constipated even with taking Miralax  Please call Ryan Marie to [phone] to let her know what to do  Thxs

## 2021-03-18 NOTE — TELEPHONE ENCOUNTER
Spoke with daughter advised of results of lab work and to continue the current therapy  Appointment made for 3/31/21 with Meghan العراقي

## 2021-03-18 NOTE — TELEPHONE ENCOUNTER
----- Message from Martha Madison DO sent at 3/18/2021  3:15 PM EDT -----  Please call the patient and tell her that her liver enzymes are decreasing nicely on the current therapy  This is a good trend  This patient should have a follow-up with David Aquino in the next two weeks

## 2021-03-19 NOTE — TELEPHONE ENCOUNTER
Spoke to [employer]    She will increase miralax to BID  Discussed results of liver labs  Will f/u on 3/31 as scheduled

## 2021-03-22 ENCOUNTER — TELEPHONE (OUTPATIENT)
Dept: SURGERY | Facility: CLINIC | Age: 86
End: 2021-03-22

## 2021-03-22 NOTE — TELEPHONE ENCOUNTER
Longe [place] [id number]  [employer] ppo [employer] sliver plan  No referral required  Effective as of 1/1/2021  I network

## 2021-03-25 ENCOUNTER — OFFICE VISIT (OUTPATIENT)
Dept: SURGERY | Facility: CLINIC | Age: 86
End: 2021-03-25
Payer: COMMERCIAL

## 2021-03-25 VITALS — HEIGHT: 59 IN | BODY MASS INDEX: 23.83 KG/M2 | HEART RATE: 67 BPM | TEMPERATURE: 97 F | WEIGHT: 118.2 LBS

## 2021-03-25 DIAGNOSIS — K63.89 COLONIC MASS: Primary | ICD-10-CM

## 2021-03-25 PROCEDURE — 1036F TOBACCO NON-USER: CPT | Performed by: SURGERY

## 2021-03-25 PROCEDURE — 1160F RVW MEDS BY RX/DR IN RCRD: CPT | Performed by: SURGERY

## 2021-03-25 PROCEDURE — 99214 OFFICE O/P EST MOD 30 MIN: CPT | Performed by: SURGERY

## 2021-03-25 RX ORDER — SODIUM CHLORIDE 9 MG/ML
125 INJECTION, SOLUTION INTRAVENOUS
Status: CANCELLED | OUTPATIENT
Start: 2021-03-26 | End: 2021-03-27

## 2021-03-25 RX ORDER — ERYTHROMYCIN 500 MG/1
TABLET, COATED ORAL
Qty: 6 TABLET | Refills: 0 | Status: SHIPPED | OUTPATIENT
Start: 2021-03-25 | End: 2021-04-25

## 2021-03-25 RX ORDER — NEOMYCIN SULFATE 500 MG/1
TABLET ORAL
Qty: 6 TABLET | Refills: 0 | Status: SHIPPED | OUTPATIENT
Start: 2021-03-25 | End: 2021-04-25

## 2021-03-25 NOTE — PROGRESS NOTES
Follow Up - General Surgery   Wolfgang Smalls 80 y o  female MRN: [id number]  Unit/Bed#:  Encounter: [id number]    Assessment/Plan     Assessment:    Right colon mass  History of hepatitis  Hyperlipidemia  Osteoporosis  Statica  Legally blind  Plan:   I had a long discussion with the patient and daughter regarding surgery and postop care, this most certainly will set her back, she may require rehab postop, etc   I discussed the operative procedure, risks, benefits, alternatives and possible complications, she would like to think about it at home and discuss it with her family before making a final decision  History of Present Illness     HPI:  Arben Mercado is a 80 y o  female who presents  To my office for follow-up after hospital stay secondary to right colon mass and acute hepatitis  Patient is feeling better, gaining strength, tolerating diet having regular bowel movement  Denies having any chills, fever, nausea, vomiting, diarrhea, constipation, blood in the stools or mucus in the stool  I reviewed the last CMP and CBC performed this month  I also went over the findings of liver Biopsy, colonoscopy findings and biopsies  From colonoscopy  Review of Systems   All other systems reviewed and are negative        Historical Information   Past Medical History:   Diagnosis Date    Hyperlipidemia     Osteoporosis     Sciatica      Past Surgical History:   Procedure Laterality Date     SECTION      COLONOSCOPY      IR BIOPSY LIVER RANDOM  3/5/2021     Social History   Social History     Substance and Sexual Activity   Alcohol Use Not Currently     Social History     Substance and Sexual Activity   Drug Use No     Social History     Tobacco Use   Smoking Status Never Smoker   Smokeless Tobacco Never Used     Family History: non-contributory    Meds/Allergies   all medications and allergies reviewed     Current Outpatient Medications:     albuterol (ProAir HFA) 90 mcg/act inhaler, Inhale 2 puffs every 6 (six) hours as needed for wheezing, Disp: 8 5 g, Rfl: 3    Cholecalciferol (CVS VITAMIN D) 2000 units CAPS, Take by mouth, Disp: , Rfl:     ibuprofen (MOTRIN) 400 mg tablet, Take 1 tablet (400 mg total) by mouth every 6 (six) hours as needed for mild pain, Disp: , Rfl:     Multiple Vitamins-Minerals (CENTRUM SILVER 50+WOMEN) TABS, Take by mouth, Disp: , Rfl:     predniSONE 10 mg tablet, Take 5 tablets (50 mg total) by mouth daily, Disp: 140 tablet, Rfl: 0    RHOPRESSA 0 02 % SOLN, instill 1 drop into both eyes at bedtime, Disp: , Rfl: 0    SIMBRINZA 1-0 2 % SUSP, , Disp: , Rfl: 0    VYZULTA 0 024 % SOLN, , Disp: , Rfl: 0    cetirizine (ZyrTEC) 10 mg tablet, Take 10 mg by mouth daily, Disp: , Rfl:     pantoprazole (PROTONIX) 40 mg tablet, Take 1 tablet (40 mg total) by mouth daily in the early morning (Patient not taking: Reported on 3/25/2021), Disp: , Rfl:   No Known Allergies    Objective     Current Vitals:   Pulse: 67 (03/25/21 1125)  Temperature: (!) 97 °F (36 1 °C) (03/25/21 1125)  Temp Source: Temporal (03/25/21 1125)  Height: 4' 11" (149 9 cm) (03/25/21 1125)  Weight - Scale: 53 6 kg (118 lb 3 2 oz) (03/25/21 1125)      Physical Exam  Vitals signs and nursing note reviewed  Constitutional:       General: She is not in acute distress  Eyes:      Comments: Patient is legally blind  Cardiovascular:      Rate and Rhythm: Normal rate and regular rhythm  Pulmonary:      Effort: No respiratory distress  Breath sounds: Normal breath sounds  Abdominal:      Palpations: Abdomen is soft  There is no mass  Tenderness: There is no abdominal tenderness  Comments: Infraumbilical midline surgical scar without evidence of incisional hernia  Skin:     Coloration: Skin is not jaundiced  Findings: No erythema or rash  Neurological:      Mental Status: She is alert and oriented to person, place, and time  Cranial Nerves: No cranial nerve deficit     Psychiatric:         Mood and Affect: Mood normal          Behavior: Behavior normal        Imaging: I have personally reviewed pertinent reports  Procedure: Xr Chest Portable    Result Date: 2/1/2021  Narrative: CHEST INDICATION:   Dyspnea  COMPARISON:  CT abdomen pelvis 1/28/2021 EXAM PERFORMED/VIEWS:  XR CHEST PORTABLE FINDINGS: Cardiomediastinal silhouette appears unremarkable  Mild bibasilar atelectasis  Left hemidiaphragm is mildly elevated  No pleural effusion or pneumothorax  Osseous structures appear within normal limits for patient age  Impression: Mild bibasilar atelectasis  Workstation performed: [id number]     Procedure: Ct Chest W Contrast    Result Date: 2/2/2021  Narrative: CT CHEST WITH IV CONTRAST INDICATION:   Colon cancer, non-metastatic, initial workup colon cancer  COMPARISON:  MRI from 1/29/2021; CT from 1/28/2021 TECHNIQUE: CT examination of the chest was performed  Axial, sagittal, and coronal 2D reformatted images were created from the source data and submitted for interpretation  Radiation dose length product (DLP) for this visit:  180 mGy-cm   This examination, like all CT scans performed in the [facility], was performed utilizing techniques to minimize radiation dose exposure, including the use of iterative reconstruction and automated exposure control  IV Contrast:  85 mL of iohexol (OMNIPAQUE) FINDINGS: LUNGS:  2 mm pleural-based nodule is noted in the right upper lobe on image 21  Right upper lobe 2 mm nodule image 26  One or 2 other small nodules less than 4 mm are identified  Bandlike consolidation adjacent to effusions is most likely related to compressive atelectasis  There Is no tracheal or endobronchial lesion  PLEURA:  Bilateral small pleural effusions  HEART/GREAT VESSELS:  Coronary calcification is present    MEDIASTINUM AND MANSI:  Unremarkable  CHEST WALL AND LOWER NECK:   Unremarkable  VISUALIZED STRUCTURES IN THE UPPER ABDOMEN:  Unremarkable   OSSEOUS STRUCTURES:  There is mild wedging of T8 and T9 T9 is unchanged as compared to the abdomen CT  Mild wedging of T5 is also identified  Impression: Small effusions  A few small nodules are identified  Based on current Fleischner Society 2017 Guidelines on incidental pulmonary nodule, patients with a known malignancy are at increased risk of metastasis and should receive initial three month follow-up chest CT  Workstation performed: [id number]     Procedure: Nm Hepatobiliary W Rx    Result Date: 1/31/2021  Narrative: HEPATOBILIARY SCAN INDICATION:  Abnormal CT and MRI appearance of the gallbladder  Further evaluation for cholecystitis COMPARISON:  CT 1/28/2021, MRI 1/29/2021 TECHNIQUE:   Following the intravenous administration of 4 5 mCi Tc-99m labeled mebrofenin, dynamic abdominal images were obtained over a 60 minute time period  Images were performed in AP projection  FINDINGS: There is normal visualization of the liver parenchyma, common bile duct and small bowel  Initially, the gallbladder did not visualize  Therefore, 2 0 mg of morphine was administered intravenously x1 in the nuclear medicine department  Following morphine administration, prompt visualization of the gallbladder occurred     Impression: Following administration of IV morphine, prompt visualization of the gallbladder, indicating a patent cystic duct  No scintigraphic evidence of acute cholecystitis Workstation performed: [id number]     Procedure: Mri Abdomen W Wo Contrast And Mrcp    Result Date: 1/30/2021  Narrative: MRI OF THE ABDOMEN WITH AND WITHOUT CONTRAST WITH MRCP INDICATION:  Assess CBD with possible a calculus cholecystitis on CT scan  COMPARISON: CT scan from yesterday   TECHNIQUE:  The following pulse sequences were obtained:  Coronal and axial T2 with TE of 90 and 180 respectively, axial T2 with fat saturation, axial FIESTA fat-sat, axial T1-weighted in-and-out-of phase, axial DWI/ADC, pre-contrast axial T1 with fat saturation, post-contrast dynamic axial T1 with fat saturation at 20, 70, and 180 seconds, followed by coronal and 7 minute delayed axial T1 with fat saturation  3D MRCP images were obtained with radial thick slabs and projections  3D rendering was performed from the acquisition scanner  IV Contrast:  5 mL of Gadobutrol injection (SINGLE-DOSE) FINDINGS: LOWER CHEST:   Unremarkable  LIVER: Normal in size and configuration  No suspicious mass  The hepatic veins and portal veins are patent  BILE DUCTS:  No intrahepatic or extrahepatic bile duct dilation  Common bile duct is normal in caliber  No choledocholithiasis, biliary stricture or suspicious mass  GALLBLADDER:  Gallbladder wall thickening  No stones  PANCREAS:  Normal  No main pancreatic ductal dilation  ADRENAL GLANDS:  Normal  SPLEEN:  Normal  KIDNEYS/PROXIMAL URETERS:  No hydroureteronephrosis  No suspicious renal mass  BOWEL:   Enhancing focus in the ascending colon reidentified suspicious for neoplasm measuring approximately 3 5 cm on image 14/323  PERITONEUM/RETROPERITONEUM:  No ascites  LYMPH NODES:  Upper abdominal adenopathy including a portacaval node measuring 1 2 cm  VASCULAR STRUCTURES:  No aneurysm  ABDOMINAL WALL:  Unremarkable  OSSEOUS STRUCTURES:  No suspicious osseous lesion  Impression: Gallbladder has an abnormal appearance with wall thickening and/or pericholecystic fluid  No stones identified  Possibility is raised of acalculous cholecystitis  CBD is normal in diameter  There is mild nonspecific enhancement of the distal CBD which may represent mild cholangitis  Upper abdominal portacaval lymphadenopathy measuring 1 2 cm  Enhancing focus in the ascending colon wall reidentified suspicious for neoplasm measuring approximately 3 5 cm on image 14/323   Workstation performed: [id number]     Procedure: Ct Abdomen Pelvis With Contrast    Result Date: 1/28/2021  Narrative: CT ABDOMEN AND PELVIS WITH IV CONTRAST INDICATION:   Abdominal pain, acute, nonlocalized pain,l transaminitis  COMPARISON:  None  TECHNIQUE:  CT examination of the abdomen and pelvis was performed  Axial, sagittal, and coronal 2D reformatted images were created from the source data and submitted for interpretation  Radiation dose length product (DLP) for this visit:  538 65 mGy-cm   This examination, like all CT scans performed in the [facility], was performed utilizing techniques to minimize radiation dose exposure, including the use of iterative  reconstruction and automated exposure control  IV Contrast:  100 mL of iohexol (OMNIPAQUE) Enteric Contrast:  Enteric contrast was not administered  FINDINGS: ABDOMEN LOWER CHEST:  No clinically significant abnormality identified in the visualized lower chest  LIVER/BILIARY TREE:  Mild intrahepatic ductal dilatation  Extrahepatic CBD with mild mucosal enhancement  GALLBLADDER:  No calcified stones identified  Minimal pericholecystic fluid  SPLEEN:  Unremarkable  PANCREAS:  Unremarkable  ADRENAL GLANDS:  Unremarkable  KIDNEYS/URETERS:  Unremarkable  No hydronephrosis  STOMACH AND BOWEL:  Colonic diverticulosis  Soft tissue mass in the ascending colon just above the ileocecal valve consistent with neoplasm measuring 3 9 x 2 1 cm  Multiple small mesenteric nodes are noted adjacent to the mass seen on image 601/65  APPENDIX:  No findings to suggest appendicitis  ABDOMINOPELVIC CAVITY:  No ascites  No pneumoperitoneum  Small mesenteric nodes in the right lower quadrant adjacent to the colon mass  VESSELS:  Unremarkable for patient's age  PELVIS REPRODUCTIVE ORGANS:  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  URINARY BLADDER:  Unremarkable  ABDOMINAL WALL/INGUINAL REGIONS:  Unremarkable  OSSEOUS STRUCTURES:  No acute fracture or destructive osseous lesion  Advanced degenerative changes of the lumbar spine       Impression: Soft tissue mass in the ascending colon just above the ileocecal valve consistent with neoplasm measuring 3 9 x 2 1 cm  Multiple small mesenteric nodes are noted adjacent to the mass seen on image 601/65  Recommend colonoscopy  Pericholecystic fluid  No calcified gallstones  Possible acalculous cholecystitis  Correlate clinically  Consider ultrasound if any clinical ambiguity  Mild nonspecific enhancement of the extrahepatic CBD  This can be seen in the setting of cholangitis  The study was marked in [place] [id number] for immediate notification  Workstation performed: [id number]     Procedure: Ir Biopsy Liver Random    Result Date: 3/5/2021  Narrative: IMAGE-GUIDED CORE BIOPSY 3/5/2021 History: Autoimmune hepatitis versus Tylenol toxicity Contrast: None Fluoroscopy time: None Number of images: 3 Radiation dose: 0 mGy Conscious sedation time: 15 Minutes Procedure: The patient's right upper abdomen was prepped and draped in sterile fashion  1% lidocaine was used for local anesthetic and conscious sedation was administered  A small skin nick was made  2 passes were made with a 18 gauge Biopince biopsy device through a 17 cm introducer needle into the right hepatic lobe under direct ultrasound guidance along the right midaxillary line using a low intercostal approach  Core specimens were submitted in formalin  The introducer needle was removed during simultaneous injection of D-stat thrombin solution  The puncture site was cleansed and covered with sterile dressing  Findings: 2 core liver specimens were submitted in formalin  Impression: Impression: Successful image-guided core biopsy of the liver   Workstation performed: [id number]     EKG, Pathology, and Other Studies: I have personally reviewed pertinent films in PACS

## 2021-03-25 NOTE — PROGRESS NOTES
Assessment/Plan:    No problem-specific Assessment & Plan notes found for this encounter  Subjective: mass in colon     Patient ID: Jessica Sanchez is a 80 y o  female          Objective:      Pulse 67   Temp (!) 97 °F (36 1 °C) (Temporal)   Ht 4' 11" (1 499 m)   Wt 53 6 kg (118 lb 3 2 oz)   Breastfeeding No   BMI 23 87 kg/m²          Physical Exam

## 2021-03-26 ENCOUNTER — TELEPHONE (OUTPATIENT)
Dept: SURGERY | Facility: CLINIC | Age: 86
End: 2021-03-26

## 2021-03-29 DIAGNOSIS — K75.4 AUTOIMMUNE HEPATITIS (HCC): ICD-10-CM

## 2021-03-29 RX ORDER — PREDNISONE 10 MG/1
50 TABLET ORAL DAILY
Qty: 140 TABLET | Refills: 0 | Status: SHIPPED | OUTPATIENT
Start: 2021-03-29 | End: 2021-04-09 | Stop reason: SDUPTHER

## 2021-03-31 ENCOUNTER — TELEPHONE (OUTPATIENT)
Dept: LAB | Facility: HOSPITAL | Age: 86
End: 2021-03-31

## 2021-03-31 ENCOUNTER — OFFICE VISIT (OUTPATIENT)
Dept: GASTROENTEROLOGY | Facility: CLINIC | Age: 86
End: 2021-03-31
Payer: COMMERCIAL

## 2021-03-31 VITALS
SYSTOLIC BLOOD PRESSURE: 130 MMHG | WEIGHT: 120.6 LBS | HEART RATE: 79 BPM | BODY MASS INDEX: 24.31 KG/M2 | HEIGHT: 59 IN | DIASTOLIC BLOOD PRESSURE: 60 MMHG

## 2021-03-31 DIAGNOSIS — K75.4 AUTOIMMUNE HEPATITIS (HCC): Primary | ICD-10-CM

## 2021-03-31 DIAGNOSIS — C18.2 MALIGNANT NEOPLASM OF ASCENDING COLON (HCC): ICD-10-CM

## 2021-03-31 PROCEDURE — 99213 OFFICE O/P EST LOW 20 MIN: CPT | Performed by: PHYSICIAN ASSISTANT

## 2021-03-31 NOTE — PROGRESS NOTES
[facility] - Outpatient Follow-up Note  Regine Roldan Cuda 80 y o  female MRN: [id number]  Encounter: [id number]          ASSESSMENT AND PLAN:      1  Autoimmune hepatitis ([facility] [address] )  2  Primary Biliary Cholangitis  Her labs have improved dramatically on Prednisone 50mg daily  She feels great  She has good energy is eating well and her weight is improving! Update labs with TPMT levels  If normal will add Imuran 50mg daily and start tapering prednisone slowly  She will drop to 40mg daily today    2  Malignant neoplasm of ascending colon ([facility] [address] )  She has decided not to pursue surgery  She is feeling well  She has 1 loose stool daily without blood or pain  Advised to limit roughage to minimize the risk of obstruction    F/U in 1 month     She asked about receiving the COVID-19 vaccine and I strongly encouraged her to pursue this - there is no contraindication at this time  ______________________________________________________________________    SUBJECTIVE:  80-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 who presents for routine follow-up  Since starting prednisone 50 mg daily her liver enzymes have drastically improved  On February 23, 2021 her AST was 1229 and her ALT was 839 with an alkaline phosphatase of 357  On March 17, 2021 after 2 weeks of prednisone her AST was 130, ALT was 177 and alkaline phosphatase was 40  Her bilirubin has normalized as well  She has gained significant amount energy  She is tolerating a regular diet and eating well  She is gaining weight she has no abdominal pain  She is having a soft bowel daily without blood she followed up with Dr Su Law last week and decided not to pursue surgery for her colon tumor  She really offers no complaints today  REVIEW OF SYSTEMS IS OTHERWISE NEGATIVE        Historical Information   Past Medical History:   Diagnosis Date    Hyperlipidemia     Osteoporosis     Sciatica      Past Surgical History:   Procedure Laterality Date     SECTION      COLONOSCOPY      IR BIOPSY LIVER RANDOM  3/5/2021     Social History   Social History     Substance and Sexual Activity   Alcohol Use Not Currently     Social History     Substance and Sexual Activity   Drug Use No     Social History     Tobacco Use   Smoking Status Never Smoker   Smokeless Tobacco Never Used     Family History   Problem Relation Age of Onset    Diabetes type II Mother     Pancreatic cancer Father     Colon cancer Sister        Meds/Allergies       Current Outpatient Medications:     albuterol (ProAir HFA) 90 mcg/act inhaler    cetirizine (ZyrTEC) 10 mg tablet    Cholecalciferol (CVS VITAMIN D) 2000 units CAPS    erythromycin base (E-MYCIN) 500 MG tablet    ibuprofen (MOTRIN) 400 mg tablet    Multiple Vitamins-Minerals (CENTRUM SILVER 50+WOMEN) TABS    neomycin (MYCIFRADIN) 500 mg tablet    predniSONE 10 mg tablet    RHOPRESSA 0 02 % SOLN    SIMBRINZA 1-0 2 % SUSP    VYZULTA 0 024 % SOLN    pantoprazole (PROTONIX) 40 mg tablet    No Known Allergies        Objective     Blood pressure 130/60, pulse 79, height 4' 11" (1 499 m), weight 54 7 kg (120 lb 9 6 oz), not currently breastfeeding  Body mass index is 24 36 kg/m²  PHYSICAL EXAM:      General Appearance:   Alert, cooperative, no distress   HEENT:   Normocephalic, atraumatic, anicteric      Neck:  Supple, symmetrical, trachea midline   Lungs:   Clear to auscultation bilaterally; no rales, rhonchi or wheezing; respirations unlabored    Heart[de-identified]   Regular rate and rhythm; no murmur, rub, or gallop  Abdomen:   Soft, non-tender, non-distended; normal bowel sounds; no masses, no organomegaly    Genitalia:   Deferred    Rectal:   Deferred    Extremities:  No cyanosis, clubbing or edema    Pulses:  2+ and symmetric    Skin:  No jaundice, rashes, or lesions    Lymph nodes:  No palpable cervical lymphadenopathy        Lab Results:   No visits with results within 1 Day(s) from this visit  Latest known visit with results is:   Appointment on 03/17/2021   Component Date Value    WBC 03/17/2021 10 62*    RBC 03/17/2021 4 25     Hemoglobin 03/17/2021 13 4     Hematocrit 03/17/2021 42 2     MCV 03/17/2021 99*    MCH 03/17/2021 31 5     MCHC 03/17/2021 31 8     RDW 03/17/2021 17 5*    MPV 03/17/2021 10 8     Platelets 34/32/9022 291     nRBC 03/17/2021 0     Neutrophils Relative 03/17/2021 66     Immat GRANS % 03/17/2021 1     Lymphocytes Relative 03/17/2021 23     Monocytes Relative 03/17/2021 9     Eosinophils Relative 03/17/2021 1     Basophils Relative 03/17/2021 0     Neutrophils Absolute 03/17/2021 7 04     Immature Grans Absolute 03/17/2021 0 10     Lymphocytes Absolute 03/17/2021 2 43     Monocytes Absolute 03/17/2021 0 95     Eosinophils Absolute 03/17/2021 0 09     Basophils Absolute 03/17/2021 0 01     Sodium 03/17/2021 139     Potassium 03/17/2021 4 8     Chloride 03/17/2021 109*    CO2 03/17/2021 31     ANION GAP 03/17/2021 -1*    BUN 03/17/2021 31*    Creatinine 03/17/2021 1 03     Glucose, Fasting 03/17/2021 169*    Calcium 03/17/2021 8 8     Corrected Calcium 03/17/2021 10 1     AST 03/17/2021 130*    ALT 03/17/2021 177*    Alkaline Phosphatase 03/17/2021 140*    Total Protein 03/17/2021 8 2     Albumin 03/17/2021 2 4*    Total Bilirubin 03/17/2021 0 98     eGFR 03/17/2021 48     Protime 03/17/2021 14 6*    INR 03/17/2021 1 12          Radiology Results:   Ir Biopsy Liver Random    Result Date: 3/5/2021  Narrative: IMAGE-GUIDED CORE BIOPSY 3/5/2021 History: Autoimmune hepatitis versus Tylenol toxicity Contrast: None Fluoroscopy time: None Number of images: 3 Radiation dose: 0 mGy Conscious sedation time: 15 Minutes Procedure: The patient's right upper abdomen was prepped and draped in sterile fashion  1% lidocaine was used for local anesthetic and conscious sedation was administered  A small skin nick was made   2 passes were made with a 18 gauge Biopince biopsy device through a 17 cm introducer needle into the right hepatic lobe under direct ultrasound guidance along the right midaxillary line using a low intercostal approach  Core specimens were submitted in formalin  The introducer needle was removed during simultaneous injection of D-stat thrombin solution  The puncture site was cleansed and covered with sterile dressing  Findings: 2 core liver specimens were submitted in formalin  Impression: Impression: Successful image-guided core biopsy of the liver   Workstation performed: [id number]

## 2021-04-05 ENCOUNTER — OFFICE VISIT (OUTPATIENT)
Dept: INTERNAL MEDICINE CLINIC | Facility: CLINIC | Age: 86
End: 2021-04-05
Payer: COMMERCIAL

## 2021-04-05 ENCOUNTER — TELEPHONE (OUTPATIENT)
Dept: LAB | Facility: HOSPITAL | Age: 86
End: 2021-04-05

## 2021-04-05 ENCOUNTER — TELEPHONE (OUTPATIENT)
Dept: INTERNAL MEDICINE CLINIC | Facility: CLINIC | Age: 86
End: 2021-04-05

## 2021-04-05 VITALS
DIASTOLIC BLOOD PRESSURE: 87 MMHG | TEMPERATURE: 98.2 F | BODY MASS INDEX: 24.03 KG/M2 | SYSTOLIC BLOOD PRESSURE: 108 MMHG | HEIGHT: 59 IN | HEART RATE: 80 BPM | WEIGHT: 119.2 LBS

## 2021-04-05 DIAGNOSIS — R74.01 TRANSAMINITIS: Primary | ICD-10-CM

## 2021-04-05 DIAGNOSIS — E78.5 HYPERLIPIDEMIA, UNSPECIFIED HYPERLIPIDEMIA TYPE: ICD-10-CM

## 2021-04-05 DIAGNOSIS — R73.01 IMPAIRED FASTING GLUCOSE: ICD-10-CM

## 2021-04-05 DIAGNOSIS — E78.2 MIXED HYPERLIPIDEMIA: ICD-10-CM

## 2021-04-05 PROCEDURE — 1170F FXNL STATUS ASSESSED: CPT | Performed by: NURSE PRACTITIONER

## 2021-04-05 PROCEDURE — G0439 PPPS, SUBSEQ VISIT: HCPCS | Performed by: NURSE PRACTITIONER

## 2021-04-05 PROCEDURE — 99213 OFFICE O/P EST LOW 20 MIN: CPT | Performed by: NURSE PRACTITIONER

## 2021-04-05 PROCEDURE — 1125F AMNT PAIN NOTED PAIN PRSNT: CPT | Performed by: NURSE PRACTITIONER

## 2021-04-05 PROCEDURE — 1036F TOBACCO NON-USER: CPT | Performed by: NURSE PRACTITIONER

## 2021-04-05 PROCEDURE — 1160F RVW MEDS BY RX/DR IN RCRD: CPT | Performed by: NURSE PRACTITIONER

## 2021-04-05 NOTE — PROGRESS NOTES
Assessment/Plan:    There are no Patient Instructions on file for this visit  There are no diagnoses linked to this encounter  Subjective:      Patient ID: Clary Renteria is a 80 y o  female    HPI      Current Outpatient Medications:     albuterol (ProAir HFA) 90 mcg/act inhaler, Inhale 2 puffs every 6 (six) hours as needed for wheezing, Disp: 8 5 g, Rfl: 3    cetirizine (ZyrTEC) 10 mg tablet, Take 10 mg by mouth daily, Disp: , Rfl:     Cholecalciferol ([employer] VITAMIN D) 2000 units CAPS, Take by mouth, Disp: , Rfl:     erythromycin base (E-MYCIN) 500 MG tablet, Take 2 tablets 1:00 p m , 2:00 p m  And 11:00 p m  day before surgery, Disp: 6 tablet, Rfl: 0    ibuprofen (MOTRIN) 400 mg tablet, Take 1 tablet (400 mg total) by mouth every 6 (six) hours as needed for mild pain, Disp: , Rfl:     Multiple Vitamins-Minerals (CENTRUM SILVER 50+WOMEN) TABS, Take by mouth, Disp: , Rfl:     neomycin (MYCIFRADIN) 500 mg tablet, Take 2 tablets at 1 p m , 2 p m  and 11:00 p m   Day before surgery, Disp: 6 tablet, Rfl: 0    predniSONE 10 mg tablet, TAKE 5 TABLETS (50 MG TOTAL) BY MOUTH DAILY (Patient taking differently: Take 40 mg by mouth daily ), Disp: 140 tablet, Rfl: 0    RHOPRESSA 0 02 % SOLN, instill 1 drop into both eyes at bedtime, Disp: , Rfl: 0    SIMBRINZA 1-0 2 % SUSP, , Disp: , Rfl: 0    VYZULTA 0 024 % SOLN, , Disp: , Rfl: 0    pantoprazole (PROTONIX) 40 mg tablet, Take 1 tablet (40 mg total) by mouth daily in the early morning (Patient not taking: Reported on 3/25/2021), Disp: , Rfl:     Recent Results (from the past 1008 hour(s))   Comprehensive metabolic panel    Collection Time: 02/23/21 11:52 AM   Result Value Ref Range    Sodium 135 (L) 136 - 145 mmol/L    Potassium 4 5 3 5 - 5 3 mmol/L    Chloride 102 100 - 108 mmol/L    CO2 31 21 - 32 mmol/L    ANION GAP 2 (L) 4 - 13 mmol/L    BUN 22 5 - 25 mg/dL    Creatinine 1 00 0 60 - 1 30 mg/dL    Glucose 115 65 - 140 mg/dL    Calcium 9 1 8 3 - 10 1 mg/dL Corrected Calcium 10 5 (H) 8 3 - 10 1 mg/dL    AST 1,229 (H) 5 - 45 U/L     (H) 12 - 78 U/L    Alkaline Phosphatase 357 (H) 46 - 116 U/L    Total Protein 10 7 (H) 6 4 - 8 2 g/dL    Albumin 2 2 (L) 3 5 - 5 0 g/dL    Total Bilirubin 3 00 (H) 0 20 - 1 00 mg/dL    eGFR 49 ml/min/1 73sq m   CBC and differential    Collection Time: 02/23/21 11:52 AM   Result Value Ref Range    WBC 8 16 4 31 - 10 16 Thousand/uL    RBC 4 44 3 81 - 5 12 Million/uL    Hemoglobin 13 7 11 5 - 15 4 g/dL    Hematocrit 42 2 34 8 - 46 1 %    MCV 95 82 - 98 fL    MCH 30 9 26 8 - 34 3 pg    MCHC 32 5 31 4 - 37 4 g/dL    RDW 19 7 (H) 11 6 - 15 1 %    MPV 9 9 8 9 - 12 7 fL    Platelets 252 437 - 344 Thousands/uL    nRBC 0 /100 WBCs    Neutrophils Relative 57 43 - 75 %    Immat GRANS % 1 0 - 2 %    Lymphocytes Relative 27 14 - 44 %    Monocytes Relative 11 4 - 12 %    Eosinophils Relative 3 0 - 6 %    Basophils Relative 1 0 - 1 %    Neutrophils Absolute 4 65 1 85 - 7 62 Thousands/µL    Immature Grans Absolute 0 09 0 00 - 0 20 Thousand/uL    Lymphocytes Absolute 2 23 0 60 - 4 47 Thousands/µL    Monocytes Absolute 0 88 0 17 - 1 22 Thousand/µL    Eosinophils Absolute 0 23 0 00 - 0 61 Thousand/µL    Basophils Absolute 0 08 0 00 - 0 10 Thousands/µL   Protime-INR    Collection Time: 02/23/21 11:52 AM   Result Value Ref Range    Protime 15 0 (H) 11 6 - 14 5 seconds    INR 1 24 (H) 0 84 - 1 19   IgG    Collection Time: 02/25/21  8:46 AM   Result Value Ref Range    IGG 5,430 0 (H) 700 0-1,600 0 mg/dL   Liver/Kidney Microsomal Antibody    Collection Time: 02/25/21  8:46 AM   Result Value Ref Range    Liver-Kidney Microsomal Ab <20 1 0 0 - 20 0 Units   Tissue Exam    Collection Time: 03/05/21  8:41 AM   Result Value Ref Range    Case Report       Surgical Pathology Report                         Case: [id number]                                   Authorizing Provider:  Ad Amato DO         Collected:           03/05/2021 1010              Ordering Location:     [facility] Received:            03/05/2021 7798                                     Interventional Radiology                                                     Pathologist:           Omi Quezada MD                                                                 Specimen:    Liver                                                                                      Final Diagnosis       A  Liver, needle core biopsy:  - Liver with moderate interface and lobular hepatitis and evidence of recent parenchymal collapse and regenerative changes, see note  - Periportal and central lobular fibrosis  Note: The histopathologic findings in combination with the clinical and laboratory values (positive JACINTA and elevated IgG) are 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)  Elevated ALK (357), focal bile duct injury and feather degeneration are noted  Of note, no antimitochondrial antibodies is available  Even though AIH patient's may have bile duct inflammation/injury, clinical correlation is necessary to exclude other cause of chronic hepatitis (e g autoimmune hepatitis and primary biliary cirrhosis overlap syndrome, drug/toxic injury)    Immunohistochemistry for cytomegalovirus, HSV1&2, adenovirus are negative  Microscopic Description       Sections reveal two needle core biopsy fragments of liver parenchyma with 10 portal areas  Triads show moderate mixed inflammation comprised of mainly plasma cells admixed with small lymphocytes, and neutrophils  There are patchy areas of mild to moderate interface necrosis and associated marked ductular proliferation  Interlobular bile ducts show focal reactive changes with rare intraepithelial lymphocytes  Portal vessels are within normal limits    Hepatic parenchyma reveals scattered foci of acute and chronic inflammation (2-4 foci per 20x field), scattered acidophil bodies, hepatocyte delbert formation, multinucleated giant cells and feathery degeneration  No steatosis, steatohepatitis or cholestasis is identified  Trichrome stain shows patricia-portal and central lobular fibrosis  Iron stain is negative;  PASD stain is negative for histologic evidence of alpha-1 antitrypsin deficiency but highlights ceroid macrophages  Reticulin stain shows focal thickened hepatic plates, suggestive of regenerative changes  CK19 highlights interlobular bile ducts and marked ductular reaction  Note       Interpretation performed at [facility], [address] [place] [address]        Additional Information       All reported additional testing was performed with appropriately reactive controls  These tests were developed and their performance characteristics determined by [address] [place] [address] [facility] or appropriate performing facility, though some tests may be performed on tissues which have not been validated for performance characteristics (such as staining performed on alcohol exposed cell blocks and decalcified tissues)  Results should be interpreted with caution and in the context of the patients clinical condition  These tests may not be cleared or approved by the [place]  Food and Drug Administration, though the FDA has determined that such clearance or approval is not necessary  These tests are used for clinical purposes and they should not be regarded as investigational or for research  This laboratory has been approved by CLIA 88, designated as a high-complexity laboratory and is qualified to perform these tests  Jeff Gum Description          A  The specimen is received in formalin, labeled with the patient's name and hospital number, and is designated "Linda Mckinney  The specimen consists of 2 tan soft tissue cores measuring 0 1 cm in diameter and ranging from 0 9-1 5 cm in length  The specimen is entirely submitted between sponges, 2 cassettes      Note: The estimated total formalin fixation time based upon information provided by the submitting clinician and the standard processing schedule is under 72 hours      [place]        Clinical Information abnormal enzymes    Protime-INR    Collection Time: 03/05/21  9:18 AM   Result Value Ref Range    Protime 14 8 (H) 11 6 - 14 5 seconds    INR 1 14 0 84 - 1 19   CBC and differential    Collection Time: 03/17/21  7:22 AM   Result Value Ref Range    WBC 10 62 (H) 4 31 - 10 16 Thousand/uL    RBC 4 25 3 81 - 5 12 Million/uL    Hemoglobin 13 4 11 5 - 15 4 g/dL    Hematocrit 42 2 34 8 - 46 1 %    MCV 99 (H) 82 - 98 fL    MCH 31 5 26 8 - 34 3 pg    MCHC 31 8 31 4 - 37 4 g/dL    RDW 17 5 (H) 11 6 - 15 1 %    MPV 10 8 8 9 - 12 7 fL    Platelets 559 912 - 585 Thousands/uL    nRBC 0 /100 WBCs    Neutrophils Relative 66 43 - 75 %    Immat GRANS % 1 0 - 2 %    Lymphocytes Relative 23 14 - 44 %    Monocytes Relative 9 4 - 12 %    Eosinophils Relative 1 0 - 6 %    Basophils Relative 0 0 - 1 %    Neutrophils Absolute 7 04 1 85 - 7 62 Thousands/µL    Immature Grans Absolute 0 10 0 00 - 0 20 Thousand/uL    Lymphocytes Absolute 2 43 0 60 - 4 47 Thousands/µL    Monocytes Absolute 0 95 0 17 - 1 22 Thousand/µL    Eosinophils Absolute 0 09 0 00 - 0 61 Thousand/µL    Basophils Absolute 0 01 0 00 - 0 10 Thousands/µL   Comprehensive metabolic panel    Collection Time: 03/17/21  7:22 AM   Result Value Ref Range    Sodium 139 136 - 145 mmol/L    Potassium 4 8 3 5 - 5 3 mmol/L    Chloride 109 (H) 100 - 108 mmol/L    CO2 31 21 - 32 mmol/L    ANION GAP -1 (L) 4 - 13 mmol/L    BUN 31 (H) 5 - 25 mg/dL    Creatinine 1 03 0 60 - 1 30 mg/dL    Glucose, Fasting 169 (H) 65 - 99 mg/dL    Calcium 8 8 8 3 - 10 1 mg/dL    Corrected Calcium 10 1 8 3 - 10 1 mg/dL     (H) 5 - 45 U/L     (H) 12 - 78 U/L    Alkaline Phosphatase 140 (H) 46 - 116 U/L    Total Protein 8 2 6 4 - 8 2 g/dL    Albumin 2 4 (L) 3 5 - 5 0 g/dL    Total Bilirubin 0 98 0 20 - 1 00 mg/dL    eGFR 48 ml/min/1 73sq m   Protime-INR    Collection Time: 03/17/21 7:22 AM   Result Value Ref Range    Protime 14 6 (H) 11 6 - 14 5 seconds    INR 1 12 0 84 - 1 19       The following portions of the patient's history were reviewed and updated as appropriate: allergies, current medications, past family history, past medical history, past social history, past surgical history and problem list      Review of Systems      Objective:      /87   Pulse 80   Temp 98 2 °F (36 8 °C)   Ht 4' 11" (1 499 m)   Wt 54 1 kg (119 lb 3 2 oz)   BMI 24 08 kg/m²        Physical Exam  Constitutional:       General: She is not in acute distress  Appearance: She is well-developed  She is not diaphoretic  HENT:      Head: Normocephalic and atraumatic  Nose: Nose normal    Eyes:      Conjunctiva/sclera: Conjunctivae normal       Pupils: Pupils are equal, round, and reactive to light  Neck:      Musculoskeletal: Normal range of motion and neck supple  Thyroid: No thyromegaly  Vascular: No JVD  Trachea: No tracheal deviation  Cardiovascular:      Rate and Rhythm: Normal rate and regular rhythm  Heart sounds: Normal heart sounds  No murmur  No friction rub  No gallop  Pulmonary:      Effort: Pulmonary effort is normal  No respiratory distress  Breath sounds: Normal breath sounds  No wheezing or rales  Abdominal:      General: Bowel sounds are normal  There is no distension  Palpations: Abdomen is soft  Tenderness: There is no abdominal tenderness  Musculoskeletal: Normal range of motion  Lymphadenopathy:      Cervical: No cervical adenopathy  Skin:     General: Skin is warm and dry  Findings: No rash  Neurological:      Mental Status: She is alert and oriented to person, place, and time  Cranial Nerves: No cranial nerve deficit  Psychiatric:         Behavior: Behavior normal          Thought Content:  Thought content normal          Judgment: Judgment normal        [place] Plan of Care: balance, strength, and gait training instructions were provided  Home safety education provided

## 2021-04-05 NOTE — PROGRESS NOTES
Assessment/Plan:    Patient Instructions    Patient 80 Year old female presents with recent change in medical condition diagnosed with autoimmune hepatitis now on high-dose steroid daily and also diagnosed with colon mass very concerning for colon cancer but patient opted not to have any intervention  Patient is legally blind, pursuing care in the home including light housekeeping, companionship, assistant with ADLs, transportation to and from [facility] and medication management  Based on patient's new medical history age she is legally blind she will greatly benefit from in-home help  Autoimmune hepatitis on high-dose steroid check A1c with next set of labs      Long conversation had about goals of care, she already has a DNR in placed, I have shared with her and her daughter the 5 wishes, and also discussed about implementing hospice in the future         Diagnoses and all orders for this visit:    Transaminitis    Hyperlipidemia, unspecified hyperlipidemia type    Impaired fasting glucose  -     HEMOGLOBIN A1C W/ EAG ESTIMATION; Future    Mixed hyperlipidemia  -     TSH, 3rd generation with Free T4 reflex; Future  -     Lipid panel; Future         Subjective:      Patient ID: Roseanne Enamorado is a 80 y o  female     Patient is here with her daughter, they were told by the insurance company that if they came here and had us justify that she needed in-home assistance that they were willing to cover up to 35 hours per week  She is legally blind, she has a special pen at home that if she runs across the PPE the paper it will read items of clothing or food except try this was given to her by the [employer] Association  Her daughter and daughter-in-law both help to prepare meals    She does need assistance getting to appointments medication management history I did assistance with ADLs such as getting in and out of the shower, light housekeeping like cooking etc   She was recently diagnosed with autoimmune hepatitis now on high-dose prednisone, additionally she was found to have a colon mass that is likely a malignancy that she has opted for no further treatment or evaluation  She is currently feeling well without any physical complaints  Current Outpatient Medications:     albuterol (ProAir HFA) 90 mcg/act inhaler, Inhale 2 puffs every 6 (six) hours as needed for wheezing, Disp: 8 5 g, Rfl: 3    cetirizine (ZyrTEC) 10 mg tablet, Take 10 mg by mouth daily, Disp: , Rfl:     Cholecalciferol (CVS VITAMIN D) 2000 units CAPS, Take by mouth, Disp: , Rfl:     erythromycin base (E-MYCIN) 500 MG tablet, Take 2 tablets 1:00 p m , 2:00 p m  And 11:00 p m  day before surgery, Disp: 6 tablet, Rfl: 0    ibuprofen (MOTRIN) 400 mg tablet, Take 1 tablet (400 mg total) by mouth every 6 (six) hours as needed for mild pain, Disp: , Rfl:     Multiple Vitamins-Minerals (CENTRUM SILVER 50+WOMEN) TABS, Take by mouth, Disp: , Rfl:     neomycin (MYCIFRADIN) 500 mg tablet, Take 2 tablets at 1 p m , 2 p m  and 11:00 p m   Day before surgery, Disp: 6 tablet, Rfl: 0    predniSONE 10 mg tablet, TAKE 5 TABLETS (50 MG TOTAL) BY MOUTH DAILY (Patient taking differently: Take 40 mg by mouth daily ), Disp: 140 tablet, Rfl: 0    RHOPRESSA 0 02 % SOLN, instill 1 drop into both eyes at bedtime, Disp: , Rfl: 0    SIMBRINZA 1-0 2 % SUSP, , Disp: , Rfl: 0    VYZULTA 0 024 % SOLN, , Disp: , Rfl: 0    Recent Results (from the past 1008 hour(s))   Comprehensive metabolic panel    Collection Time: 02/23/21 11:52 AM   Result Value Ref Range    Sodium 135 (L) 136 - 145 mmol/L    Potassium 4 5 3 5 - 5 3 mmol/L    Chloride 102 100 - 108 mmol/L    CO2 31 21 - 32 mmol/L    ANION GAP 2 (L) 4 - 13 mmol/L    BUN 22 5 - 25 mg/dL    Creatinine 1 00 0 60 - 1 30 mg/dL    Glucose 115 65 - 140 mg/dL    Calcium 9 1 8 3 - 10 1 mg/dL    Corrected Calcium 10 5 (H) 8 3 - 10 1 mg/dL    AST 1,229 (H) 5 - 45 U/L     (H) 12 - 78 U/L    Alkaline Phosphatase 357 (H) 46 - 116 U/L    Total Protein 10 7 (H) 6 4 - 8 2 g/dL    Albumin 2 2 (L) 3 5 - 5 0 g/dL    Total Bilirubin 3 00 (H) 0 20 - 1 00 mg/dL    eGFR 49 ml/min/1 73sq m   CBC and differential    Collection Time: 02/23/21 11:52 AM   Result Value Ref Range    WBC 8 16 4 31 - 10 16 Thousand/uL    RBC 4 44 3 81 - 5 12 Million/uL    Hemoglobin 13 7 11 5 - 15 4 g/dL    Hematocrit 42 2 34 8 - 46 1 %    MCV 95 82 - 98 fL    MCH 30 9 26 8 - 34 3 pg    MCHC 32 5 31 4 - 37 4 g/dL    RDW 19 7 (H) 11 6 - 15 1 %    MPV 9 9 8 9 - 12 7 fL    Platelets 309 640 - 017 Thousands/uL    nRBC 0 /100 WBCs    Neutrophils Relative 57 43 - 75 %    Immat GRANS % 1 0 - 2 %    Lymphocytes Relative 27 14 - 44 %    Monocytes Relative 11 4 - 12 %    Eosinophils Relative 3 0 - 6 %    Basophils Relative 1 0 - 1 %    Neutrophils Absolute 4 65 1 85 - 7 62 Thousands/µL    Immature Grans Absolute 0 09 0 00 - 0 20 Thousand/uL    Lymphocytes Absolute 2 23 0 60 - 4 47 Thousands/µL    Monocytes Absolute 0 88 0 17 - 1 22 Thousand/µL    Eosinophils Absolute 0 23 0 00 - 0 61 Thousand/µL    Basophils Absolute 0 08 0 00 - 0 10 Thousands/µL   Protime-INR    Collection Time: 02/23/21 11:52 AM   Result Value Ref Range    Protime 15 0 (H) 11 6 - 14 5 seconds    INR 1 24 (H) 0 84 - 1 19   IgG    Collection Time: 02/25/21  8:46 AM   Result Value Ref Range    IGG 5,430 0 (H) 700 0-1,600 0 mg/dL   Liver/Kidney Microsomal Antibody    Collection Time: 02/25/21  8:46 AM   Result Value Ref Range    Liver-Kidney Microsomal Ab <20 1 0 0 - 20 0 Units   Tissue Exam    Collection Time: 03/05/21  8:41 AM   Result Value Ref Range    Case Report       Surgical Pathology Report                         Case: [id number]                                   Authorizing Provider:  Chad Hubbard DO         Collected:           03/05/2021 0227              Ordering Location:     [address] [place] [address] Received:            03/05/2021 1249                                     Interventional Radiology                                                     Pathologist:           Karrie Pastrana MD                                                                 Specimen:    Liver                                                                                      Final Diagnosis       A  Liver, needle core biopsy:  - Liver with moderate interface and lobular hepatitis and evidence of recent parenchymal collapse and regenerative changes, see note  - Periportal and central lobular fibrosis  Note: The histopathologic findings in combination with the clinical and laboratory values (positive JACINTA and elevated IgG) are 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)  Elevated ALK (357), focal bile duct injury and feather degeneration are noted  Of note, no antimitochondrial antibodies is available  Even though AIH patient's may have bile duct inflammation/injury, clinical correlation is necessary to exclude other cause of chronic hepatitis (e g autoimmune hepatitis and primary biliary cirrhosis overlap syndrome, drug/toxic injury)    Immunohistochemistry for cytomegalovirus, HSV1&2, adenovirus are negative  Microscopic Description       Sections reveal two needle core biopsy fragments of liver parenchyma with 10 portal areas  Triads show moderate mixed inflammation comprised of mainly plasma cells admixed with small lymphocytes, and neutrophils  There are patchy areas of mild to moderate interface necrosis and associated marked ductular proliferation  Interlobular bile ducts show focal reactive changes with rare intraepithelial lymphocytes  Portal vessels are within normal limits  Hepatic parenchyma reveals scattered foci of acute and chronic inflammation (2-4 foci per 20x field), scattered acidophil bodies, hepatocyte delbert formation, multinucleated giant cells and feathery degeneration  No steatosis, steatohepatitis or cholestasis is identified   Trichrome stain shows patricia-portal and central lobular fibrosis  Iron stain is negative;  PASD stain is negative for histologic evidence of alpha-1 antitrypsin deficiency but highlights ceroid macrophages  Reticulin stain shows focal thickened hepatic plates, suggestive of regenerative changes  CK19 highlights interlobular bile ducts and marked ductular reaction  Note       Interpretation performed at [facility], [address] [place] [id number]        Additional Information       All reported additional testing was performed with appropriately reactive controls  These tests were developed and their performance characteristics determined by [address] [place] [address] [facility]ty Laboratory or appropriate performing facility, though some tests may be performed on tissues which have not been validated for performance characteristics (such as staining performed on alcohol exposed cell blocks and decalcified tissues)  Results should be interpreted with caution and in the context of the patients clinical condition  These tests may not be cleared or approved by the [place]  Food and Drug Administration, though the FDA has determined that such clearance or approval is not necessary  These tests are used for clinical purposes and they should not be regarded as investigational or for research  This laboratory has been approved by [facility], designated as a high-complexity laboratory and is qualified to perform these tests  Catrachito Arrieta Description          A  The specimen is received in formalin, labeled with the patient's name and hospital number, and is designated "[facility] Moss  The specimen consists of 2 tan soft tissue cores measuring 0 1 cm in diameter and ranging from 0 9-1 5 cm in length  The specimen is entirely submitted between sponges, 2 cassettes  Note: The estimated total formalin fixation time based upon information provided by the submitting clinician and the standard processing schedule is under 72 hours      [place]        Clinical Information abnormal enzymes    Protime-INR    Collection Time: 03/05/21  9:18 AM   Result Value Ref Range    Protime 14 8 (H) 11 6 - 14 5 seconds    INR 1 14 0 84 - 1 19   CBC and differential    Collection Time: 03/17/21  7:22 AM   Result Value Ref Range    WBC 10 62 (H) 4 31 - 10 16 Thousand/uL    RBC 4 25 3 81 - 5 12 Million/uL    Hemoglobin 13 4 11 5 - 15 4 g/dL    Hematocrit 42 2 34 8 - 46 1 %    MCV 99 (H) 82 - 98 fL    MCH 31 5 26 8 - 34 3 pg    MCHC 31 8 31 4 - 37 4 g/dL    RDW 17 5 (H) 11 6 - 15 1 %    MPV 10 8 8 9 - 12 7 fL    Platelets 933 814 - 289 Thousands/uL    nRBC 0 /100 WBCs    Neutrophils Relative 66 43 - 75 %    Immat GRANS % 1 0 - 2 %    Lymphocytes Relative 23 14 - 44 %    Monocytes Relative 9 4 - 12 %    Eosinophils Relative 1 0 - 6 %    Basophils Relative 0 0 - 1 %    Neutrophils Absolute 7 04 1 85 - 7 62 Thousands/µL    Immature Grans Absolute 0 10 0 00 - 0 20 Thousand/uL    Lymphocytes Absolute 2 43 0 60 - 4 47 Thousands/µL    Monocytes Absolute 0 95 0 17 - 1 22 Thousand/µL    Eosinophils Absolute 0 09 0 00 - 0 61 Thousand/µL    Basophils Absolute 0 01 0 00 - 0 10 Thousands/µL   Comprehensive metabolic panel    Collection Time: 03/17/21  7:22 AM   Result Value Ref Range    Sodium 139 136 - 145 mmol/L    Potassium 4 8 3 5 - 5 3 mmol/L    Chloride 109 (H) 100 - 108 mmol/L    CO2 31 21 - 32 mmol/L    ANION GAP -1 (L) 4 - 13 mmol/L    BUN 31 (H) 5 - 25 mg/dL    Creatinine 1 03 0 60 - 1 30 mg/dL    Glucose, Fasting 169 (H) 65 - 99 mg/dL    Calcium 8 8 8 3 - 10 1 mg/dL    Corrected Calcium 10 1 8 3 - 10 1 mg/dL     (H) 5 - 45 U/L     (H) 12 - 78 U/L    Alkaline Phosphatase 140 (H) 46 - 116 U/L    Total Protein 8 2 6 4 - 8 2 g/dL    Albumin 2 4 (L) 3 5 - 5 0 g/dL    Total Bilirubin 0 98 0 20 - 1 00 mg/dL    eGFR 48 ml/min/1 73sq m   Protime-INR    Collection Time: 03/17/21  7:22 AM   Result Value Ref Range    Protime 14 6 (H) 11 6 - 14 5 seconds    INR 1 12 0 84 - 1 19       The following portions of the patient's history were reviewed and updated as appropriate: allergies, current medications, past family history, past medical history, past social history, past surgical history and problem list      Review of Systems   Constitutional: Negative for appetite change, chills, diaphoresis, fatigue, fever and unexpected weight change  HENT: Negative for postnasal drip and sneezing  Eyes: Negative for visual disturbance  Respiratory: Negative for chest tightness and shortness of breath  Cardiovascular: Negative for chest pain, palpitations and leg swelling  Gastrointestinal: Negative for abdominal pain and blood in stool  Endocrine: Negative for cold intolerance, heat intolerance, polydipsia, polyphagia and polyuria  Genitourinary: Negative for difficulty urinating, dysuria, frequency and urgency  Musculoskeletal: Negative for arthralgias and myalgias  Skin: Negative for rash and wound  Neurological: Negative for dizziness, weakness, light-headedness and headaches  Hematological: Negative for adenopathy  Psychiatric/Behavioral: Negative for confusion, dysphoric mood and sleep disturbance  The patient is not nervous/anxious  Objective:      /87   Pulse 80   Temp 98 2 °F (36 8 °C)   Ht 4' 11" (1 499 m)   Wt 54 1 kg (119 lb 3 2 oz)   BMI 24 08 kg/m²        Physical Exam  Constitutional:       General: She is not in acute distress  Appearance: She is well-developed  She is not diaphoretic  HENT:      Head: Normocephalic and atraumatic  Nose: Nose normal    Eyes:      Conjunctiva/sclera: Conjunctivae normal       Pupils: Pupils are equal, round, and reactive to light  Neck:      Musculoskeletal: Normal range of motion and neck supple  Thyroid: No thyromegaly  Vascular: No JVD  Trachea: No tracheal deviation  Cardiovascular:      Rate and Rhythm: Normal rate and regular rhythm  Heart sounds: Normal heart sounds  No murmur  No friction rub   No gallop  Pulmonary:      Effort: Pulmonary effort is normal  No respiratory distress  Breath sounds: Normal breath sounds  No wheezing or rales  Abdominal:      General: Bowel sounds are normal  There is no distension  Palpations: Abdomen is soft  Tenderness: There is no abdominal tenderness  Musculoskeletal: Normal range of motion  Lymphadenopathy:      Cervical: No cervical adenopathy  Skin:     General: Skin is warm and dry  Findings: No rash  Neurological:      Mental Status: She is alert and oriented to person, place, and time  Cranial Nerves: No cranial nerve deficit  Psychiatric:         Behavior: Behavior normal          Thought Content:  Thought content normal          Judgment: Judgment normal

## 2021-04-05 NOTE — PROGRESS NOTES
Assessment and Plan:     Problem List Items Addressed This Visit        Endocrine    Impaired fasting glucose    Relevant Orders    HEMOGLOBIN A1C W/ EAG ESTIMATION       Other    Hyperlipidemia    Relevant Orders    TSH, 3rd generation with Free T4 reflex    Lipid panel    Transaminitis - Primary           Preventive health issues were discussed with patient, and age appropriate screening tests were ordered as noted in patient's After Visit Summary  Personalized health advice and appropriate referrals for health education or preventive services given if needed, as noted in patient's After Visit Summary  History of Present Illness:     Patient presents for Welcome to Medicare visit       Patient Care Team:  Juve Elam MD as PCP - Olaf Xiong MD as PCP - [address] [place] [address] (RTE)  MD Evelyn Guzman PA-C as Physician Assistant (Physician Assistant)     Review of Systems:     Review of Systems   Problem List:     Patient Active Problem List   Diagnosis    Esophageal reflux    Hyperlipidemia    Hypertension    Impaired fasting glucose    Macular degeneration    Osteoporosis    Vitamin D deficiency    Family history of colon cancer    Myalgia    Benign skin lesion    Colonic mass    Acalculous cholecystitis    Elevated blood pressure reading    Anxiety    Transaminitis    Shortness of breath    Cough      Past Medical and Surgical History:     Past Medical History:   Diagnosis Date    Hyperlipidemia     Osteoporosis     Sciatica      Past Surgical History:   Procedure Laterality Date     SECTION      COLONOSCOPY      IR BIOPSY LIVER RANDOM  3/5/2021      Family History:     Family History   Problem Relation Age of Onset    Diabetes type II Mother     Pancreatic cancer Father     Colon cancer Sister       Social History:     E-Cigarette/Vaping    E-Cigarette Use Never User      E-Cigarette/Vaping Substances    Nicotine No     Flavoring No Social History     Socioeconomic History    Marital status:      Spouse name: None    Number of children: None    Years of education: None    Highest education level: None   Occupational History    Occupation: Retired    Social Needs    Financial resource strain: None    Food insecurity     Worry: None     Inability: None    Transportation needs     Medical: None     Non-medical: None   Tobacco Use    Smoking status: Never Smoker    Smokeless tobacco: Never Used   Substance and Sexual Activity    Alcohol use: Not Currently    Drug use: No    Sexual activity: Not Currently     Partners: Male   Lifestyle    Physical activity     Days per week: 0 days     Minutes per session: 0 min    Stress: Not at all   Relationships    Social connections     Talks on phone: None     Gets together: None     Attends Worship service: None     Active member of club or organization: None     Attends meetings of clubs or organizations: None     Relationship status: None    Intimate partner violence     Fear of current or ex partner: None     Emotionally abused: None     Physically abused: None     Forced sexual activity: None   Other Topics Concern    None   Social History Narrative    Active advance directive       Medications and Allergies:     Current Outpatient Medications   Medication Sig Dispense Refill    albuterol (ProAir HFA) 90 mcg/act inhaler Inhale 2 puffs every 6 (six) hours as needed for wheezing 8 5 g 3    cetirizine (ZyrTEC) 10 mg tablet Take 10 mg by mouth daily      Cholecalciferol (CVS VITAMIN D) 2000 units CAPS Take by mouth      erythromycin base (E-MYCIN) 500 MG tablet Take 2 tablets 1:00 p m , 2:00 p m   And 11:00 p m  day before surgery 6 tablet 0    ibuprofen (MOTRIN) 400 mg tablet Take 1 tablet (400 mg total) by mouth every 6 (six) hours as needed for mild pain      Multiple Vitamins-Minerals (CENTRUM SILVER 50+WOMEN) TABS Take by mouth      neomycin (MYCIFRADIN) 500 mg tablet Take 2 tablets at 1 p m , 2 p m  and 11:00 p m  Day before surgery 6 tablet 0    predniSONE 10 mg tablet TAKE 5 TABLETS (50 MG TOTAL) BY MOUTH DAILY (Patient taking differently: Take 40 mg by mouth daily ) 140 tablet 0    RHOPRESSA 0 02 % SOLN instill 1 drop into both eyes at bedtime  0    SIMBRINZA 1-0 2 % SUSP   0    VYZULTA 0 024 % SOLN   0    pantoprazole (PROTONIX) 40 mg tablet Take 1 tablet (40 mg total) by mouth daily in the early morning (Patient not taking: Reported on 3/25/2021)       No current facility-administered medications for this visit  No Known Allergies   Immunizations:     Immunization History   Administered Date(s) Administered    INFLUENZA 12/22/2003, 10/23/2013, 11/06/2018, 10/25/2019    Influenza Split High Dose Preservative Free IM 10/15/2014, 11/03/2015, 11/29/2016, 10/25/2019    Influenza, high dose seasonal 0 7 mL 12/03/2020    Influenza, seasonal, injectable 10/19/2017    Pneumococcal Conjugate 13-Valent 09/29/2016    Pneumococcal Polysaccharide PPV23 05/15/1929, 10/19/2017    Tdap 05/15/1929      Health Maintenance:         Topic Date Due    DXA SCAN  06/09/2018    Hepatitis C Screening  Completed         Topic Date Due    Hepatitis A Vaccine (1 of 2 - Risk 2-dose series) Never done    COVID-19 Vaccine (1) Never done    Hepatitis B Vaccine (1 of 3 - Risk 3-dose series) Never done    DTaP,Tdap,and Td Vaccines (1 - Tdap) 05/15/1950      Medicare Screening Tests and Risk Assessments:     Blu Sherman is here for her Subsequent Wellness visit  Health Risk Assessment:   Patient rates overall health as fair  Patient feels that their physical health rating is same  Patient is very satisfied with their life  Eyesight was rated as much worse  Hearing was rated as same  Patient feels that their emotional and mental health rating is same  Patients states they are never, rarely angry  Patient states they are never, rarely unusually tired/fatigued   Pain experienced in the last 7 days has been none  Patient states that she has experienced no weight loss or gain in last 6 months  Fall Risk Screening: In the past year, patient has experienced: no history of falling in past year      Urinary Incontinence Screening:   Patient has not leaked urine accidently in the last six months  Home Safety:  Patient does not have trouble with stairs inside or outside of their home  Patient has working smoke alarms and has working carbon monoxide detector  Home safety hazards include: none  Medications:   Patient is currently taking over-the-counter supplements  OTC medications include: see medication list      Activities of Daily Living (ADLs)/Instrumental Activities of Daily Living (IADLs):   Walk and transfer into and out of bed and chair?: Yes  Dress and groom yourself?: Yes    Bathe or shower yourself?: Yes    Feed yourself?  Yes  Do your laundry/housekeeping?: No  Manage your money, pay your bills and track your expenses?: Yes  Make your own meals?: No    Do your own shopping?: No    Previous Hospitalizations:   Any hospitalizations or ED visits within the last 12 months?: Yes    How many hospitalizations have you had in the last year?: 1-2    Advance Care Planning:     Five wishes given: Yes    End of Life Decisions reviewed with patient: Yes    Provider agrees with end of life decisions: Yes      Cognitive Screening:   Provider or family/friend/caregiver concerned regarding cognition?: No    PREVENTIVE SCREENINGS      Cardiovascular Screening:    General: Screening Not Indicated and History Lipid Disorder      Diabetes Screening:     General: Screening Current      Colorectal Cancer Screening:     General: Screening Not Indicated      Breast Cancer Screening:     General: Patient Declines      Cervical Cancer Screening:    General: Screening Not Indicated      Osteoporosis Screening:    General: Screening Not Indicated and History Osteoporosis      Abdominal Aortic Aneurysm (AAA) Screening:        General: Screening Not Indicated      Lung Cancer Screening:     General: Screening Not Indicated      Hepatitis C Screening:    General: Screening Current    No exam data present     Physical Exam:     /87   Pulse 80   Temp 98 2 °F (36 8 °C)   Ht 4' 11" (1 499 m)   Wt 54 1 kg (119 lb 3 2 oz)   BMI 24 08 kg/m²     Physical Exam  Constitutional:       Appearance: She is well-developed  HENT:      Head: Normocephalic and atraumatic  Eyes:      Pupils: Pupils are equal, round, and reactive to light  Neck:      Musculoskeletal: Normal range of motion and neck supple  Thyroid: No thyromegaly  Cardiovascular:      Rate and Rhythm: Normal rate and regular rhythm  Heart sounds: No murmur  Pulmonary:      Effort: Pulmonary effort is normal       Breath sounds: Normal breath sounds  Abdominal:      General: Bowel sounds are normal       Palpations: Abdomen is soft  Musculoskeletal: Normal range of motion  Lymphadenopathy:      Cervical: No cervical adenopathy  Skin:     General: Skin is warm and dry  Neurological:      Mental Status: She is alert and oriented to person, place, and time            AMNA Nicolas

## 2021-04-05 NOTE — PATIENT INSTRUCTIONS
Patient 80 Year old female presents with recent change in medical condition diagnosed with autoimmune hepatitis now on high-dose steroid daily and also diagnosed with colon mass very concerning for colon cancer but patient opted not to have any intervention  Patient is legally blind, pursuing care in the home including light housekeeping, companionship, assistant with ADLs, transportation to and from Dr Joel Bejarano and medication management  Based on patient's new medical history age she is legally blind she will greatly benefit from in-home help        Autoimmune hepatitis on high-dose steroid check A1c with next set of labs      Long conversation had about goals of care, she already has a DNR in placed, I have shared with her and her daughter the 5 wishes, and also discussed about implementing hospice in the future

## 2021-04-06 NOTE — TELEPHONE ENCOUNTER
4/6 - entered note in system regarding script being faxed over for new lab order to be drawn at appt

## 2021-04-08 DIAGNOSIS — K75.4 AUTOIMMUNE HEPATITIS (HCC): Primary | ICD-10-CM

## 2021-04-08 RX ORDER — AZATHIOPRINE 50 MG/1
50 TABLET ORAL DAILY
Qty: 90 TABLET | Refills: 3 | Status: SHIPPED | OUTPATIENT
Start: 2021-04-08

## 2021-04-08 NOTE — TELEPHONE ENCOUNTER
Early Feeling, patient's daughter, called wanted to speak with Suzie Lassiter about patient's medication  Patient is currently taking prednisone hs tapered down to 4 a day  Patient has 2 days left of prednisone  Patient was to also start Imuran  They have not received the medication yet  Tho Hernandez has questions about the medication  Tho Hernandez also has questions about what  patient can eat  Is patient allowed to eat cabbage and blueberries   Please call Tho Hernandez at [phone] ty

## 2021-04-09 ENCOUNTER — APPOINTMENT (OUTPATIENT)
Dept: LAB | Facility: HOSPITAL | Age: 86
End: 2021-04-09
Attending: SURGERY
Payer: COMMERCIAL

## 2021-04-09 DIAGNOSIS — E78.2 MIXED HYPERLIPIDEMIA: ICD-10-CM

## 2021-04-09 DIAGNOSIS — K75.4 AUTOIMMUNE HEPATITIS (HCC): ICD-10-CM

## 2021-04-09 DIAGNOSIS — R74.01 TRANSAMINITIS: ICD-10-CM

## 2021-04-09 DIAGNOSIS — R73.01 IMPAIRED FASTING GLUCOSE: ICD-10-CM

## 2021-04-09 DIAGNOSIS — K63.89 COLONIC MASS: ICD-10-CM

## 2021-04-09 LAB
BASOPHILS # BLD AUTO: 0.04 THOUSANDS/ΜL (ref 0–0.1)
BASOPHILS NFR BLD AUTO: 0 % (ref 0–1)
EOSINOPHIL # BLD AUTO: 0.11 THOUSAND/ΜL (ref 0–0.61)
EOSINOPHIL NFR BLD AUTO: 1 % (ref 0–6)
ERYTHROCYTE [DISTWIDTH] IN BLOOD BY AUTOMATED COUNT: 16.2 % (ref 11.6–15.1)
EST. AVERAGE GLUCOSE BLD GHB EST-MCNC: 117 MG/DL
HBA1C MFR BLD: 5.7 %
HCT VFR BLD AUTO: 45.5 % (ref 34.8–46.1)
HGB BLD-MCNC: 14.8 G/DL (ref 11.5–15.4)
IMM GRANULOCYTES # BLD AUTO: 0.14 THOUSAND/UL (ref 0–0.2)
IMM GRANULOCYTES NFR BLD AUTO: 1 % (ref 0–2)
INR PPP: 1.02 (ref 0.84–1.19)
LYMPHOCYTES # BLD AUTO: 2.5 THOUSANDS/ΜL (ref 0.6–4.47)
LYMPHOCYTES NFR BLD AUTO: 19 % (ref 14–44)
MCH RBC QN AUTO: 31.9 PG (ref 26.8–34.3)
MCHC RBC AUTO-ENTMCNC: 32.5 G/DL (ref 31.4–37.4)
MCV RBC AUTO: 98 FL (ref 82–98)
MONOCYTES # BLD AUTO: 0.9 THOUSAND/ΜL (ref 0.17–1.22)
MONOCYTES NFR BLD AUTO: 7 % (ref 4–12)
NEUTROPHILS # BLD AUTO: 9.22 THOUSANDS/ΜL (ref 1.85–7.62)
NEUTS SEG NFR BLD AUTO: 72 % (ref 43–75)
NRBC BLD AUTO-RTO: 0 /100 WBCS
PLATELET # BLD AUTO: 278 THOUSANDS/UL (ref 149–390)
PMV BLD AUTO: 10.2 FL (ref 8.9–12.7)
PREALB SERPL-MCNC: 22.5 MG/DL (ref 18–40)
PROTHROMBIN TIME: 12.9 SECONDS (ref 11.6–14.5)
RBC # BLD AUTO: 4.64 MILLION/UL (ref 3.81–5.12)
WBC # BLD AUTO: 12.91 THOUSAND/UL (ref 4.31–10.16)

## 2021-04-09 PROCEDURE — 80061 LIPID PANEL: CPT

## 2021-04-09 PROCEDURE — 36415 COLL VENOUS BLD VENIPUNCTURE: CPT

## 2021-04-09 PROCEDURE — 85610 PROTHROMBIN TIME: CPT

## 2021-04-09 PROCEDURE — 83036 HEMOGLOBIN GLYCOSYLATED A1C: CPT

## 2021-04-09 PROCEDURE — 84134 ASSAY OF PREALBUMIN: CPT

## 2021-04-09 PROCEDURE — 80053 COMPREHEN METABOLIC PANEL: CPT

## 2021-04-09 PROCEDURE — 84443 ASSAY THYROID STIM HORMONE: CPT

## 2021-04-09 PROCEDURE — 85025 COMPLETE CBC W/AUTO DIFF WBC: CPT

## 2021-04-09 PROCEDURE — 82542 COL CHROMOTOGRAPHY QUAL/QUAN: CPT

## 2021-04-09 RX ORDER — PREDNISONE 10 MG/1
40 TABLET ORAL DAILY
Qty: 360 TABLET | Refills: 0 | Status: SHIPPED | OUTPATIENT
Start: 2021-04-09 | End: 2021-08-09

## 2021-04-09 NOTE — TELEPHONE ENCOUNTER
Adri Baca - patient's daughter, Maria Alejandra Thrasher called again  Patient is down to her last 4 prednisone and will be out of medication tomorrow  Should patient be without medication  Also, patient wanted to know if she can eat the cabbage and blueberries   Please call Maria Alejandra Thrasher at [phone] ty

## 2021-04-09 NOTE — TELEPHONE ENCOUNTER
ALEKSEY: Spoke with patients daughter  Patient history of autoimmune hepatitis, PBC, malignant neoplasm of colon    Patient is currently on prednisone 40mg daily, and will start imuran tonight  The plan was to taper patient prednisone very slowly  Advised patient to continue prednisone 40mg daily, and we will follow-up in her OV on 4/30/2021

## 2021-04-11 LAB
ALBUMIN SERPL BCP-MCNC: 3.1 G/DL (ref 3.5–5)
ALP SERPL-CCNC: 73 U/L (ref 46–116)
ALT SERPL W P-5'-P-CCNC: 48 U/L (ref 12–78)
ANION GAP SERPL CALCULATED.3IONS-SCNC: 4 MMOL/L (ref 4–13)
AST SERPL W P-5'-P-CCNC: 25 U/L (ref 5–45)
BILIRUB SERPL-MCNC: 0.58 MG/DL (ref 0.2–1)
BUN SERPL-MCNC: 28 MG/DL (ref 5–25)
CALCIUM ALBUM COR SERPL-MCNC: 9.8 MG/DL (ref 8.3–10.1)
CALCIUM SERPL-MCNC: 9.1 MG/DL (ref 8.3–10.1)
CHLORIDE SERPL-SCNC: 105 MMOL/L (ref 100–108)
CHOLEST SERPL-MCNC: 281 MG/DL (ref 50–200)
CO2 SERPL-SCNC: 29 MMOL/L (ref 21–32)
CREAT SERPL-MCNC: 0.87 MG/DL (ref 0.6–1.3)
GFR SERPL CREATININE-BSD FRML MDRD: 58 ML/MIN/1.73SQ M
GLUCOSE SERPL-MCNC: 134 MG/DL (ref 65–140)
HDLC SERPL-MCNC: 78 MG/DL
LDLC SERPL CALC-MCNC: 173 MG/DL (ref 0–100)
NONHDLC SERPL-MCNC: 203 MG/DL
POTASSIUM SERPL-SCNC: 4.7 MMOL/L (ref 3.5–5.3)
PROT SERPL-MCNC: 7.3 G/DL (ref 6.4–8.2)
SODIUM SERPL-SCNC: 138 MMOL/L (ref 136–145)
TRIGL SERPL-MCNC: 152 MG/DL
TSH SERPL DL<=0.05 MIU/L-ACNC: 1.96 UIU/ML (ref 0.36–3.74)

## 2021-04-13 ENCOUNTER — TELEPHONE (OUTPATIENT)
Dept: INTERNAL MEDICINE CLINIC | Facility: CLINIC | Age: 86
End: 2021-04-13

## 2021-04-13 NOTE — TELEPHONE ENCOUNTER
Spoke with: patient's daughter  Re:  Lab results/low aime diet  Provider's message/resuts/instructions/inquiries relayed in full detal   Comments:

## 2021-04-13 NOTE — TELEPHONE ENCOUNTER
----- Message from Nilesh Roberto, [address] sent at 4/13/2021 10:16 AM EDT -----  Her labs all look stable except for chol is a little higher   Would just recommend a low chol diet

## 2021-04-14 ENCOUNTER — TELEPHONE (OUTPATIENT)
Dept: INTERNAL MEDICINE CLINIC | Facility: CLINIC | Age: 86
End: 2021-04-14

## 2021-04-14 DIAGNOSIS — H35.30 MACULAR DEGENERATION OF BOTH EYES, UNSPECIFIED TYPE: Primary | ICD-10-CM

## 2021-04-14 DIAGNOSIS — R26.9 NEUROLOGIC GAIT DYSFUNCTION: ICD-10-CM

## 2021-04-14 DIAGNOSIS — K75.4 AUTOIMMUNE HEPATITIS (HCC): ICD-10-CM

## 2021-04-14 DIAGNOSIS — K63.89 COLONIC MASS: ICD-10-CM

## 2021-04-14 NOTE — TELEPHONE ENCOUNTER
John Maher [facility]) said she spk to someone last wk about getting home care w/[facility]     PT needs help bathing, cooking and help around the house  [place] She stu like call back w/status of this so she knows what to expect

## 2021-04-15 ENCOUNTER — IMMUNIZATIONS (OUTPATIENT)
Dept: FAMILY MEDICINE CLINIC | Facility: HOSPITAL | Age: 86
End: 2021-04-15

## 2021-04-15 DIAGNOSIS — K75.4 AUTOIMMUNE HEPATITIS (HCC): Primary | ICD-10-CM

## 2021-04-15 DIAGNOSIS — Z23 ENCOUNTER FOR IMMUNIZATION: Primary | ICD-10-CM

## 2021-04-15 LAB
REF LAB TEST METHOD: NORMAL
TEST INTERPRETATION: NORMAL
TPMT RBC-CCNC: 26.7 UNITS/ML RBC

## 2021-04-15 PROCEDURE — 0001A SARS-COV-2 / COVID-19 MRNA VACCINE (PFIZER-BIONTECH) 30 MCG: CPT

## 2021-04-15 PROCEDURE — 91300 SARS-COV-2 / COVID-19 MRNA VACCINE (PFIZER-BIONTECH) 30 MCG: CPT

## 2021-04-22 ENCOUNTER — TELEPHONE (OUTPATIENT)
Dept: LAB | Facility: HOSPITAL | Age: 86
End: 2021-04-22

## 2021-04-28 ENCOUNTER — APPOINTMENT (OUTPATIENT)
Dept: LAB | Facility: HOSPITAL | Age: 86
End: 2021-04-28
Payer: COMMERCIAL

## 2021-04-28 DIAGNOSIS — K75.4 AUTOIMMUNE HEPATITIS (HCC): ICD-10-CM

## 2021-04-28 DIAGNOSIS — R74.01 TRANSAMINITIS: ICD-10-CM

## 2021-04-28 LAB
ALBUMIN SERPL BCP-MCNC: 2.8 G/DL (ref 3.5–5)
ALP SERPL-CCNC: 57 U/L (ref 46–116)
ALT SERPL W P-5'-P-CCNC: 27 U/L (ref 12–78)
ANION GAP SERPL CALCULATED.3IONS-SCNC: 5 MMOL/L (ref 4–13)
AST SERPL W P-5'-P-CCNC: 21 U/L (ref 5–45)
BASOPHILS # BLD AUTO: 0.03 THOUSANDS/ΜL (ref 0–0.1)
BASOPHILS NFR BLD AUTO: 0 % (ref 0–1)
BILIRUB SERPL-MCNC: 0.47 MG/DL (ref 0.2–1)
BUN SERPL-MCNC: 29 MG/DL (ref 5–25)
CALCIUM ALBUM COR SERPL-MCNC: 9.6 MG/DL (ref 8.3–10.1)
CALCIUM SERPL-MCNC: 8.6 MG/DL (ref 8.3–10.1)
CHLORIDE SERPL-SCNC: 103 MMOL/L (ref 100–108)
CO2 SERPL-SCNC: 28 MMOL/L (ref 21–32)
CREAT SERPL-MCNC: 0.97 MG/DL (ref 0.6–1.3)
EOSINOPHIL # BLD AUTO: 0.02 THOUSAND/ΜL (ref 0–0.61)
EOSINOPHIL NFR BLD AUTO: 0 % (ref 0–6)
ERYTHROCYTE [DISTWIDTH] IN BLOOD BY AUTOMATED COUNT: 14.6 % (ref 11.6–15.1)
GFR SERPL CREATININE-BSD FRML MDRD: 51 ML/MIN/1.73SQ M
GLUCOSE SERPL-MCNC: 168 MG/DL (ref 65–140)
HCT VFR BLD AUTO: 42.4 % (ref 34.8–46.1)
HGB BLD-MCNC: 13.8 G/DL (ref 11.5–15.4)
IMM GRANULOCYTES # BLD AUTO: 0.18 THOUSAND/UL (ref 0–0.2)
IMM GRANULOCYTES NFR BLD AUTO: 2 % (ref 0–2)
INR PPP: 1.02 (ref 0.84–1.19)
LYMPHOCYTES # BLD AUTO: 1.75 THOUSANDS/ΜL (ref 0.6–4.47)
LYMPHOCYTES NFR BLD AUTO: 16 % (ref 14–44)
MCH RBC QN AUTO: 31.5 PG (ref 26.8–34.3)
MCHC RBC AUTO-ENTMCNC: 32.5 G/DL (ref 31.4–37.4)
MCV RBC AUTO: 97 FL (ref 82–98)
MONOCYTES # BLD AUTO: 0.38 THOUSAND/ΜL (ref 0.17–1.22)
MONOCYTES NFR BLD AUTO: 4 % (ref 4–12)
NEUTROPHILS # BLD AUTO: 8.52 THOUSANDS/ΜL (ref 1.85–7.62)
NEUTS SEG NFR BLD AUTO: 78 % (ref 43–75)
NRBC BLD AUTO-RTO: 0 /100 WBCS
PLATELET # BLD AUTO: 232 THOUSANDS/UL (ref 149–390)
PMV BLD AUTO: 10.6 FL (ref 8.9–12.7)
POTASSIUM SERPL-SCNC: 4.6 MMOL/L (ref 3.5–5.3)
PROT SERPL-MCNC: 6.8 G/DL (ref 6.4–8.2)
PROTHROMBIN TIME: 12.9 SECONDS (ref 11.6–14.5)
RBC # BLD AUTO: 4.38 MILLION/UL (ref 3.81–5.12)
SODIUM SERPL-SCNC: 136 MMOL/L (ref 136–145)
WBC # BLD AUTO: 10.88 THOUSAND/UL (ref 4.31–10.16)

## 2021-04-28 PROCEDURE — 85025 COMPLETE CBC W/AUTO DIFF WBC: CPT

## 2021-04-28 PROCEDURE — 36415 COLL VENOUS BLD VENIPUNCTURE: CPT

## 2021-04-28 PROCEDURE — 80053 COMPREHEN METABOLIC PANEL: CPT

## 2021-04-28 PROCEDURE — 85610 PROTHROMBIN TIME: CPT

## 2021-04-30 ENCOUNTER — OFFICE VISIT (OUTPATIENT)
Dept: GASTROENTEROLOGY | Facility: CLINIC | Age: 86
End: 2021-04-30
Payer: COMMERCIAL

## 2021-04-30 VITALS
SYSTOLIC BLOOD PRESSURE: 114 MMHG | HEIGHT: 59 IN | WEIGHT: 118 LBS | BODY MASS INDEX: 23.79 KG/M2 | DIASTOLIC BLOOD PRESSURE: 64 MMHG | HEART RATE: 74 BPM

## 2021-04-30 DIAGNOSIS — K75.4 AUTOIMMUNE HEPATITIS (HCC): Primary | ICD-10-CM

## 2021-04-30 DIAGNOSIS — C18.2 MALIGNANT NEOPLASM OF ASCENDING COLON (HCC): ICD-10-CM

## 2021-04-30 PROCEDURE — 99213 OFFICE O/P EST LOW 20 MIN: CPT | Performed by: PHYSICIAN ASSISTANT

## 2021-04-30 RX ORDER — PREDNISONE 1 MG/1
TABLET ORAL
Qty: 60 TABLET | Refills: 0 | Status: SHIPPED | OUTPATIENT
Start: 2021-04-30 | End: 2021-08-06

## 2021-04-30 NOTE — PROGRESS NOTES
[facility] - Outpatient Follow-up Note  Wendy Linares Cuda 80 y o  female MRN: [id number]  Encounter: [id number]          ASSESSMENT AND PLAN:      1  Autoimmune hepatitis ([facility] [address] )  She is doing very well on Prednisone 40mg and Imuran 50mg daily  Drop Prednisone to 35mg daily  Repeat labs in May  F/U in 1 month    2  Malignant neoplasm of ascending colon ([facility] [address] )  She continues to feel well without obstructive symptoms  Will monitor    ______________________________________________________________________    SUBJECTIVE:  80year old female with autoimmune hepatitis/PBC   Crossover presents for follow-up  She is doing very well  She is on 40 mg of prednisone and recently started 50 mg of Imuran  Her daughter is concerned because she feels that her mother's energy levels have decreased since starting Imuran  The patient reports that this is because she is bored  She lives in in senior living center in several people have been diagnosed with COVID thus the patient is trying to quarantine  She did recently receive her 1st COVID shot and reports that went well  Her labs have normalized  Her liver enzymes specifically are normal   Her white blood cell count remains stable on Imuran  Patient denies abdominal pain, nausea vomiting, diarrhea, constipation or rectal bleeding  As noted of last appointment she has decided not to pursue surgery for her colon cancer  She is eating well  REVIEW OF SYSTEMS IS OTHERWISE NEGATIVE        Historical Information   Past Medical History:   Diagnosis Date    Hyperlipidemia     Osteoporosis     Sciatica      Past Surgical History:   Procedure Laterality Date     SECTION      COLONOSCOPY      IR BIOPSY LIVER RANDOM  3/5/2021     Social History   Social History     Substance and Sexual Activity   Alcohol Use Not Currently     Social History     Substance and Sexual Activity   Drug Use No     Social History     Tobacco Use   Smoking Status Never Smoker   Smokeless Tobacco Never Used     Family History   Problem Relation Age of Onset    Diabetes type II Mother     Pancreatic cancer Father     Colon cancer Sister        Meds/Allergies       Current Outpatient Medications:     albuterol (ProAir HFA) 90 mcg/act inhaler    azaTHIOprine (IMURAN) 50 mg tablet    cetirizine (ZyrTEC) 10 mg tablet    Cholecalciferol (CVS VITAMIN D) 2000 units CAPS    ibuprofen (MOTRIN) 400 mg tablet    Multiple Vitamins-Minerals (CENTRUM SILVER 50+WOMEN) TABS    predniSONE 10 mg tablet    RHOPRESSA 0 02 % SOLN    SIMBRINZA 1-0 2 % SUSP    VYZULTA 0 024 % SOLN    predniSONE 5 mg tablet    No Known Allergies        Objective     Blood pressure 114/64, pulse 74, height 4' 11" (1 499 m), weight 53 5 kg (118 lb), not currently breastfeeding  Body mass index is 23 83 kg/m²  PHYSICAL EXAM:      General Appearance:   Alert, cooperative, no distress   HEENT:   Normocephalic, atraumatic, anicteric      Neck:  Supple, symmetrical, trachea midline   Lungs:   Clear to auscultation bilaterally; no rales, rhonchi or wheezing; respirations unlabored    Heart[de-identified]   Regular rate and rhythm; no murmur, rub, or gallop  Abdomen:   Soft, non-tender, non-distended; normal bowel sounds; no masses, no organomegaly    Genitalia:   Deferred    Rectal:   Deferred    Extremities:  No cyanosis, clubbing or edema    Pulses:  2+ and symmetric    Skin:  No jaundice, rashes, or lesions    Lymph nodes:  No palpable cervical lymphadenopathy        Lab Results:   No visits with results within 1 Day(s) from this visit     Latest known visit with results is:   Appointment on 04/28/2021   Component Date Value    WBC 04/28/2021 10 88*    RBC 04/28/2021 4 38     Hemoglobin 04/28/2021 13 8     Hematocrit 04/28/2021 42 4     MCV 04/28/2021 97     MCH 04/28/2021 31 5     MCHC 04/28/2021 32 5     RDW 04/28/2021 14 6     MPV 04/28/2021 10 6     Platelets 30/17/7779 232     nRBC 04/28/2021 0     Neutrophils Relative 04/28/2021 78*    Immat GRANS % 04/28/2021 2     Lymphocytes Relative 04/28/2021 16     Monocytes Relative 04/28/2021 4     Eosinophils Relative 04/28/2021 0     Basophils Relative 04/28/2021 0     Neutrophils Absolute 04/28/2021 8 52*    Immature Grans Absolute 04/28/2021 0 18     Lymphocytes Absolute 04/28/2021 1 75     Monocytes Absolute 04/28/2021 0 38     Eosinophils Absolute 04/28/2021 0 02     Basophils Absolute 04/28/2021 0 03     Sodium 04/28/2021 136     Potassium 04/28/2021 4 6     Chloride 04/28/2021 103     CO2 04/28/2021 28     ANION GAP 04/28/2021 5     BUN 04/28/2021 29*    Creatinine 04/28/2021 0 97     Glucose 04/28/2021 168*    Calcium 04/28/2021 8 6     Corrected Calcium 04/28/2021 9 6     AST 04/28/2021 21     ALT 04/28/2021 27     Alkaline Phosphatase 04/28/2021 57     Total Protein 04/28/2021 6 8     Albumin 04/28/2021 2 8*    Total Bilirubin 04/28/2021 0 47     eGFR 04/28/2021 51     Protime 04/28/2021 12 9     INR 04/28/2021 1 02          Radiology Results:   No results found

## 2021-05-04 ENCOUNTER — TELEPHONE (OUTPATIENT)
Dept: GASTROENTEROLOGY | Facility: CLINIC | Age: 86
End: 2021-05-04

## 2021-05-04 NOTE — TELEPHONE ENCOUNTER
Hansa pt-  Patient's daughter has a questions on her Mom's prednisone dosage     Please phone [phone]

## 2021-05-05 NOTE — TELEPHONE ENCOUNTER
Called spoke to Standard Pondera (Daughter) advised as per Marilu's office note of 4/30/21 pt is to drop Prednisone to 35 mg daily

## 2021-05-08 ENCOUNTER — IMMUNIZATIONS (OUTPATIENT)
Dept: FAMILY MEDICINE CLINIC | Facility: HOSPITAL | Age: 86
End: 2021-05-08

## 2021-05-08 DIAGNOSIS — Z23 ENCOUNTER FOR IMMUNIZATION: Primary | ICD-10-CM

## 2021-05-08 PROCEDURE — 91300 SARS-COV-2 / COVID-19 MRNA VACCINE (PFIZER-BIONTECH) 30 MCG: CPT

## 2021-05-08 PROCEDURE — 0002A SARS-COV-2 / COVID-19 MRNA VACCINE (PFIZER-BIONTECH) 30 MCG: CPT

## 2021-05-10 DIAGNOSIS — K75.4 AUTOIMMUNE HEPATITIS (HCC): Primary | ICD-10-CM

## 2021-05-10 NOTE — TELEPHONE ENCOUNTER
I spoke with patient's daughter  We agreed to stop the imuran for the time being although these would not be common side effects  She will call back next week - if not improving will start Lactulose 30mL BID to start and will titrate based upon diarrhea side effect

## 2021-05-10 NOTE — TELEPHONE ENCOUNTER
Spoke with patients daughter  History of autoimmune hepatitis, malignant neoplasm of colon    Patient within the last 2-3 weeks has only been getting up to eat and sleep  Her daughter has noticed increase confusion, and unwillingness to perform her own ADL's  She has been on Imuran for 1 month, and jaylon laureano daughter feels this is the reason  Patient is also taking prednisone 35mg daily  Any suggestions?

## 2021-05-10 NOTE — TELEPHONE ENCOUNTER
Patient's daughter L/M Gerhardt Sep She has questions and concerns re her mother     Please phone [phone]

## 2021-05-14 ENCOUNTER — TELEPHONE (OUTPATIENT)
Dept: INTERNAL MEDICINE CLINIC | Facility: CLINIC | Age: 86
End: 2021-05-14

## 2021-05-14 RX ORDER — LACTULOSE 20 G/30ML
20 SOLUTION ORAL 2 TIMES DAILY
Qty: 1800 ML | Refills: 2 | Status: SHIPPED | OUTPATIENT
Start: 2021-05-14 | End: 2021-05-18

## 2021-05-14 NOTE — TELEPHONE ENCOUNTER
Sampson Joe - patient's daughter, Buffy Simon, called  Patient is not doing well  Patient was taken off the Imuran, due to concerns with memory  Patient's memory is getting worse  Could this be beginning of dementia? Did we want to start patient on a medication that Sampson Joe mentioned earlier?  Please call Buffy Simon at [phone] ty

## 2021-05-14 NOTE — TELEPHONE ENCOUNTER
Spoke with patient daughter  History of autoimmune hepatitis, malignant neoplasm of colon    Patients memory has worsened  She would like her to try lactulose  Advised we can send this and also she should follow-up with her PCP regarding dementia

## 2021-05-17 ENCOUNTER — TELEPHONE (OUTPATIENT)
Dept: GASTROENTEROLOGY | Facility: CLINIC | Age: 86
End: 2021-05-17

## 2021-05-17 ENCOUNTER — TELEPHONE (OUTPATIENT)
Dept: INTERNAL MEDICINE CLINIC | Facility: CLINIC | Age: 86
End: 2021-05-17

## 2021-05-17 NOTE — TELEPHONE ENCOUNTER
[facility] received a request from the patient's family regarding hospice care  Patient had an appointment today with Dr Jordin Mak  If he feels patient should received hospice care, forward an order to evaluate and treat      [facility]  [facility], [place]: [phone]  FAX: [phone]

## 2021-05-17 NOTE — TELEPHONE ENCOUNTER
I did not see her  She was not seen by anyone today    Please clarify, I can set up a virtual visit if needed or she can come in and see me tomorrow

## 2021-05-17 NOTE — TELEPHONE ENCOUNTER
Marilu patient - Meds for lactulose 20 g/30 mL [[id number]] , patient's daughter is saying the [address] @ [phone] did not get the new script for the above meds  Please call Melissa Selby (daughter) to let her know the script was sent to the pharmacy    x

## 2021-05-17 NOTE — TELEPHONE ENCOUNTER
[facility] called back; Noemi's daughter- she's the Emergency contact    She said that her mother; Moises Maryann is seeing Dr Kemi Rogers tomorrow

## 2021-05-18 ENCOUNTER — OFFICE VISIT (OUTPATIENT)
Dept: INTERNAL MEDICINE CLINIC | Facility: CLINIC | Age: 86
End: 2021-05-18
Payer: COMMERCIAL

## 2021-05-18 VITALS
DIASTOLIC BLOOD PRESSURE: 70 MMHG | TEMPERATURE: 97.9 F | HEIGHT: 59 IN | SYSTOLIC BLOOD PRESSURE: 110 MMHG | OXYGEN SATURATION: 96 % | HEART RATE: 65 BPM | WEIGHT: 116.2 LBS | BODY MASS INDEX: 23.43 KG/M2

## 2021-05-18 DIAGNOSIS — R73.01 IMPAIRED FASTING GLUCOSE: ICD-10-CM

## 2021-05-18 DIAGNOSIS — G31.84 MCI (MILD COGNITIVE IMPAIRMENT): Primary | ICD-10-CM

## 2021-05-18 DIAGNOSIS — K63.89 COLONIC MASS: ICD-10-CM

## 2021-05-18 DIAGNOSIS — R25.1 TREMOR: ICD-10-CM

## 2021-05-18 DIAGNOSIS — K75.4 AUTOIMMUNE HEPATITIS (HCC): ICD-10-CM

## 2021-05-18 PROCEDURE — 3288F FALL RISK ASSESSMENT DOCD: CPT | Performed by: INTERNAL MEDICINE

## 2021-05-18 PROCEDURE — 3725F SCREEN DEPRESSION PERFORMED: CPT | Performed by: INTERNAL MEDICINE

## 2021-05-18 PROCEDURE — 1100F PTFALLS ASSESS-DOCD GE2>/YR: CPT | Performed by: INTERNAL MEDICINE

## 2021-05-18 PROCEDURE — 99214 OFFICE O/P EST MOD 30 MIN: CPT | Performed by: INTERNAL MEDICINE

## 2021-05-18 NOTE — PATIENT INSTRUCTIONS
Lab data reviewed in detail and compared prior    Autoimmune hepatitis-transaminases have normalized on Imuran and prednisone  Imuran discontinued  Following with GI  Cognitive impairment-I see no evidence to suggest hepatic encephalopathy  She has no asterixis  More likely related to vascular or dementia of the Alzheimer's type  Colon mass presumed malignant-patient has declined treatment    Impaired fasting glucose-A1c was 5 7 despite prednisone use    Tremor and leukocytosis are likely related to prednisone use  Hospice care was discussed and will hold off for now  Patient will need 24 hour care and we did discuss assisted living options  Patient family will discuss and get back to me

## 2021-05-18 NOTE — PROGRESS NOTES
Assessment/Plan:    Diagnoses and all orders for this visit:    MCI (mild cognitive impairment)    Tremor    Autoimmune hepatitis ([address] )    Colonic mass    Impaired fasting glucose           Falls Plan of Care: balance, strength, and gait training instructions were provided  Patient Instructions   Lab data reviewed in detail and compared prior    Autoimmune hepatitis-transaminases have normalized on Imuran and prednisone  Imuran discontinued  Following with GI  Cognitive impairment-I see no evidence to suggest hepatic encephalopathy  She has no asterixis  More likely related to vascular or dementia of the Alzheimer's type  Colon mass presumed malignant-patient has declined treatment    Impaired fasting glucose-A1c was 5 7 despite prednisone use    Tremor and leukocytosis are likely related to prednisone use  Hospice care was discussed and will hold off for now  Patient will need 24 hour care and we did discuss assisted living options  Patient family will discuss and get back to me  Subjective:      Patient ID: Elia Verdin is a 80 y o  female    F/u MMP, here w/ dgtr Augustine Barthel who helps w/ hx      I received a message from a local hospice organization asking for a referral   Augustine Barthel notes that patient requires 24 hour care because of her blindness and now multiple new symptoms including cognitive impairment and generalized weakness  She states when she originally was put on prednisone she quickly returned to her pre-hospital appetite and energy level but that has declined  Augustine Barthel has been with her for 4 months but needs to go home for a week or 2  Normal wall half to be living with her son and daughter-in-law who both work during the day  She was seen by GI April 30th to review labs  At that time she was taking off of Imuran and started lactulose for presumed hepatic encephalopathy  Patient has not started lactulose yet  Her bowels move regularly    She has not had any blood in the stool     ColonTumor presumed malignant -patient has not had any abdominal pain, constipation, melena or hematochezia           Current Outpatient Medications:     Cholecalciferol (CVS VITAMIN D) 2000 units CAPS, Take by mouth, Disp: , Rfl:     Multiple Vitamins-Minerals (CENTRUM SILVER 50+WOMEN) TABS, Take by mouth, Disp: , Rfl:     predniSONE 10 mg tablet, Take 4 tablets (40 mg total) by mouth daily (Patient taking differently: Take 35 mg by mouth daily ), Disp: 360 tablet, Rfl: 0    predniSONE 5 mg tablet, TAKE AS DIRECTED, Disp: 60 tablet, Rfl: 0    RHOPRESSA 0 02 % SOLN, instill 1 drop into both eyes at bedtime, Disp: , Rfl: 0    SIMBRINZA 1-0 2 % SUSP, , Disp: , Rfl: 0    VYZULTA 0 024 % SOLN, , Disp: , Rfl: 0    albuterol (ProAir HFA) 90 mcg/act inhaler, Inhale 2 puffs every 6 (six) hours as needed for wheezing (Patient not taking: Reported on 5/18/2021), Disp: 8 5 g, Rfl: 3    azaTHIOprine (IMURAN) 50 mg tablet, Take 1 tablet (50 mg total) by mouth daily (Patient not taking: Reported on 5/18/2021), Disp: 90 tablet, Rfl: 3    cetirizine (ZyrTEC) 10 mg tablet, Take 10 mg by mouth daily, Disp: , Rfl:     ibuprofen (MOTRIN) 400 mg tablet, Take 1 tablet (400 mg total) by mouth every 6 (six) hours as needed for mild pain (Patient not taking: Reported on 5/18/2021), Disp: , Rfl:     Recent Results (from the past 1008 hour(s))   Prealbumin    Collection Time: 04/09/21  7:19 AM   Result Value Ref Range    Prealbumin 22 5 18 0 - 40 0 mg/dL   TPMT ENZYME ACTIVITY,BLOOD    Collection Time: 04/09/21  7:19 AM   Result Value Ref Range    TPMT ACTIVITY 26 7 Units/mL RBC    Interpretation Comment     METHODOLOGY Comment    CBC and differential    Collection Time: 04/09/21  7:19 AM   Result Value Ref Range    WBC 12 91 (H) 4 31 - 10 16 Thousand/uL    RBC 4 64 3 81 - 5 12 Million/uL    Hemoglobin 14 8 11 5 - 15 4 g/dL    Hematocrit 45 5 34 8 - 46 1 %    MCV 98 82 - 98 fL    MCH 31 9 26 8 - 34 3 pg    MCHC 32 5 31 4 - 37 4 g/dL    RDW 16 2 (H) 11 6 - 15 1 %    MPV 10 2 8 9 - 12 7 fL    Platelets 003 922 - 322 Thousands/uL    nRBC 0 /100 WBCs    Neutrophils Relative 72 43 - 75 %    Immat GRANS % 1 0 - 2 %    Lymphocytes Relative 19 14 - 44 %    Monocytes Relative 7 4 - 12 %    Eosinophils Relative 1 0 - 6 %    Basophils Relative 0 0 - 1 %    Neutrophils Absolute 9 22 (H) 1 85 - 7 62 Thousands/µL    Immature Grans Absolute 0 14 0 00 - 0 20 Thousand/uL    Lymphocytes Absolute 2 50 0 60 - 4 47 Thousands/µL    Monocytes Absolute 0 90 0 17 - 1 22 Thousand/µL    Eosinophils Absolute 0 11 0 00 - 0 61 Thousand/µL    Basophils Absolute 0 04 0 00 - 0 10 Thousands/µL   Comprehensive metabolic panel    Collection Time: 04/09/21  7:19 AM   Result Value Ref Range    Sodium 138 136 - 145 mmol/L    Potassium 4 7 3 5 - 5 3 mmol/L    Chloride 105 100 - 108 mmol/L    CO2 29 21 - 32 mmol/L    ANION GAP 4 4 - 13 mmol/L    BUN 28 (H) 5 - 25 mg/dL    Creatinine 0 87 0 60 - 1 30 mg/dL    Glucose 134 65 - 140 mg/dL    Calcium 9 1 8 3 - 10 1 mg/dL    Corrected Calcium 9 8 8 3 - 10 1 mg/dL    AST 25 5 - 45 U/L    ALT 48 12 - 78 U/L    Alkaline Phosphatase 73 46 - 116 U/L    Total Protein 7 3 6 4 - 8 2 g/dL    Albumin 3 1 (L) 3 5 - 5 0 g/dL    Total Bilirubin 0 58 0 20 - 1 00 mg/dL    eGFR 58 ml/min/1 73sq m   Protime-INR    Collection Time: 04/09/21  7:19 AM   Result Value Ref Range    Protime 12 9 11 6 - 14 5 seconds    INR 1 02 0 84 - 1 19   HEMOGLOBIN A1C W/ EAG ESTIMATION    Collection Time: 04/09/21  7:19 AM   Result Value Ref Range    Hemoglobin A1C 5 7 (H) Normal 3 8-5 6%; PreDiabetic 5 7-6 4%;  Diabetic >=6 5%; Glycemic control for adults with diabetes <7 0% %     mg/dl   TSH, 3rd generation with Free T4 reflex    Collection Time: 04/09/21  7:19 AM   Result Value Ref Range    TSH 3RD GENERATON 1 960 0 358 - 3 740 uIU/mL   Lipid panel    Collection Time: 04/09/21  7:19 AM   Result Value Ref Range    Cholesterol 281 (H) 50 - 200 mg/dL Triglycerides 152 (H) <=150 mg/dL    HDL, Direct 78 >=40 mg/dL    LDL Calculated 173 (H) 0 - 100 mg/dL    Non-HDL-Chol (CHOL-HDL) 203 mg/dl   CBC and differential    Collection Time: 04/28/21  7:10 AM   Result Value Ref Range    WBC 10 88 (H) 4 31 - 10 16 Thousand/uL    RBC 4 38 3 81 - 5 12 Million/uL    Hemoglobin 13 8 11 5 - 15 4 g/dL    Hematocrit 42 4 34 8 - 46 1 %    MCV 97 82 - 98 fL    MCH 31 5 26 8 - 34 3 pg    MCHC 32 5 31 4 - 37 4 g/dL    RDW 14 6 11 6 - 15 1 %    MPV 10 6 8 9 - 12 7 fL    Platelets 390 144 - 070 Thousands/uL    nRBC 0 /100 WBCs    Neutrophils Relative 78 (H) 43 - 75 %    Immat GRANS % 2 0 - 2 %    Lymphocytes Relative 16 14 - 44 %    Monocytes Relative 4 4 - 12 %    Eosinophils Relative 0 0 - 6 %    Basophils Relative 0 0 - 1 %    Neutrophils Absolute 8 52 (H) 1 85 - 7 62 Thousands/µL    Immature Grans Absolute 0 18 0 00 - 0 20 Thousand/uL    Lymphocytes Absolute 1 75 0 60 - 4 47 Thousands/µL    Monocytes Absolute 0 38 0 17 - 1 22 Thousand/µL    Eosinophils Absolute 0 02 0 00 - 0 61 Thousand/µL    Basophils Absolute 0 03 0 00 - 0 10 Thousands/µL   Comprehensive metabolic panel    Collection Time: 04/28/21  7:10 AM   Result Value Ref Range    Sodium 136 136 - 145 mmol/L    Potassium 4 6 3 5 - 5 3 mmol/L    Chloride 103 100 - 108 mmol/L    CO2 28 21 - 32 mmol/L    ANION GAP 5 4 - 13 mmol/L    BUN 29 (H) 5 - 25 mg/dL    Creatinine 0 97 0 60 - 1 30 mg/dL    Glucose 168 (H) 65 - 140 mg/dL    Calcium 8 6 8 3 - 10 1 mg/dL    Corrected Calcium 9 6 8 3 - 10 1 mg/dL    AST 21 5 - 45 U/L    ALT 27 12 - 78 U/L    Alkaline Phosphatase 57 46 - 116 U/L    Total Protein 6 8 6 4 - 8 2 g/dL    Albumin 2 8 (L) 3 5 - 5 0 g/dL    Total Bilirubin 0 47 0 20 - 1 00 mg/dL    eGFR 51 ml/min/1 73sq m   Protime-INR    Collection Time: 04/28/21  7:10 AM   Result Value Ref Range    Protime 12 9 11 6 - 14 5 seconds    INR 1 02 0 84 - 1 19       The following portions of the patient's history were reviewed and updated as appropriate: allergies, current medications, past family history, past medical history, past social history, past surgical history and problem list      Review of Systems   Constitutional: Negative for appetite change, chills, diaphoresis, fatigue, fever and unexpected weight change  HENT: Negative for congestion, hearing loss and rhinorrhea  Eyes: Positive for visual disturbance  Respiratory: Negative for cough, chest tightness, shortness of breath and wheezing  Cardiovascular: Negative for chest pain, palpitations and leg swelling  Gastrointestinal: Negative for abdominal pain and blood in stool  Endocrine: Negative for cold intolerance, heat intolerance, polydipsia and polyuria  Genitourinary: Negative for difficulty urinating, dysuria, frequency and urgency  Musculoskeletal: Positive for gait problem  Negative for arthralgias and myalgias  Skin: Negative for rash  Neurological: Positive for weakness  Negative for dizziness, light-headedness and headaches  Hematological: Does not bruise/bleed easily  Psychiatric/Behavioral: Negative for dysphoric mood and sleep disturbance  Objective:      Vitals:    05/18/21 1058   BP: 110/70   Pulse: 65   Temp: 97 9 °F (36 6 °C)   SpO2: 96%          Physical Exam  Constitutional:       Appearance: She is well-developed  HENT:      Head: Normocephalic and atraumatic  Nose: Nose normal    Eyes:      General: No scleral icterus  Conjunctiva/sclera: Conjunctivae normal       Pupils: Pupils are equal, round, and reactive to light  Neck:      Musculoskeletal: Normal range of motion and neck supple  Thyroid: No thyromegaly  Vascular: No JVD  Trachea: No tracheal deviation  Cardiovascular:      Rate and Rhythm: Normal rate and regular rhythm  Heart sounds: No murmur  No friction rub  No gallop  Pulmonary:      Effort: Pulmonary effort is normal  No respiratory distress  Breath sounds: Normal breath sounds  No wheezing or rales  Abdominal:      General: Bowel sounds are normal  There is no distension  Palpations: Abdomen is soft  There is no mass  Tenderness: There is no abdominal tenderness  There is no guarding or rebound  Musculoskeletal:         General: No tenderness  Comments: No asterixes   Lymphadenopathy:      Cervical: No cervical adenopathy  Skin:     General: Skin is warm and dry  Findings: No erythema or rash  Neurological:      Mental Status: She is alert and oriented to person, place, and time  Cranial Nerves: No cranial nerve deficit  Psychiatric:         Behavior: Behavior normal          Thought Content:  Thought content normal          Judgment: Judgment normal

## 2021-05-20 ENCOUNTER — TELEPHONE (OUTPATIENT)
Dept: GASTROENTEROLOGY | Facility: CLINIC | Age: 86
End: 2021-05-20

## 2021-05-20 NOTE — TELEPHONE ENCOUNTER
I spoke with Maria Alejandra Thrasher  We agreed to hold on lactulose  Will drop prednisone to 30 and restart imuran  I will try to drop her prednisone as quickly as possible but do not want to risk a flair of her hepatitis

## 2021-05-20 NOTE — TELEPHONE ENCOUNTER
Laura Jones-  Patient's daughter has questions on the prednisone medication     Please phone [facility] @ [phone]

## 2021-05-26 ENCOUNTER — TELEPHONE (OUTPATIENT)
Dept: GASTROENTEROLOGY | Facility: CLINIC | Age: 86
End: 2021-05-26

## 2021-05-26 ENCOUNTER — TELEPHONE (OUTPATIENT)
Dept: INTERNAL MEDICINE CLINIC | Facility: CLINIC | Age: 86
End: 2021-05-26

## 2021-05-26 DIAGNOSIS — K75.4 AUTOIMMUNE HEPATITIS (HCC): Primary | ICD-10-CM

## 2021-05-26 RX ORDER — PANTOPRAZOLE SODIUM 40 MG/1
40 TABLET, DELAYED RELEASE ORAL
Qty: 30 TABLET | Refills: 5 | Status: SHIPPED | OUTPATIENT
Start: 2021-05-26

## 2021-05-26 NOTE — TELEPHONE ENCOUNTER
Pt's daughter said she is returning a call from yesterday    no info left, just to call Dr Lucero's office back

## 2021-05-26 NOTE — TELEPHONE ENCOUNTER
The prednisone is likely increasing her appetite, though it can cause gastritis or stomach ulcers which could make her appetite poor  I prescribed pantoprazole to help with any ulcers or gastritis  It is also quite possible that the colon malignancy is progressing leading to poor appetite

## 2021-05-26 NOTE — TELEPHONE ENCOUNTER
Nohemi Hugo is really concerned as to why patient does not want to eat- should they stop the prednisone or cut it back and should they seek a geriatric specialist to figure this problem out and what is wrong with her     Please call jaylon at [phone]

## 2021-05-26 NOTE — TELEPHONE ENCOUNTER
Marilu patient - Prednisone 5 mg, not doing better, Brian Aguiar would like to know if we can just stop the Prednisone at this time as they think that is the cause  Please RTRN call to [phone]   Thx

## 2021-05-26 NOTE — TELEPHONE ENCOUNTER
I did not call    I have communicated with GI and they agreed to stop the lactulose and I believe they were going to call her daughter to confirm that that was okay with them otherwise I am not aware of any calls

## 2021-06-01 ENCOUNTER — TELEPHONE (OUTPATIENT)
Dept: GERIATRICS | Age: 86
End: 2021-06-01

## 2021-06-01 NOTE — TELEPHONE ENCOUNTER
[address] [place] [address]  [phone]    Telephone Intake: Geriatric Assessment     Referral source: varies doctors                                          Caller who is scheduling/relationship to patient: Gumaro Landers phone number: [phone]              Is there a POA in place/If so, who has POA? Yes , Kingston Ferguson daughter    Reason for referral: Family member concerns regarding memory concerns  What is the goal of visit? does patient have dementia>     Has the patient been seen by a Neurologist or Geriatrician? No  Has the patient ever been diagnosed with dementia? No      Preferred language? enlish  Highest education level? High school  Does the patient wear glasses? No  Does the patient use hearing aids? No     Does the patient and/or caregiver have access for a virtual visit (computer or smart phone, working audio and video)? No    Caller was informed:    Please make sure the patient is accompanied by someone who knows them well / a caregiver / family member to participate in this appointment  If a patient plans to attend the assessment alone, please route a message to the assigned provider   Primary number on file will be called to confirm this appointment  Office packet mailed out? Yes     NOTE FOR : If the patient was recently hospitalized, please route intake to assigned provider for chart review

## 2021-06-03 ENCOUNTER — TELEPHONE (OUTPATIENT)
Dept: INTERNAL MEDICINE CLINIC | Facility: CLINIC | Age: 86
End: 2021-06-03

## 2021-06-04 ENCOUNTER — APPOINTMENT (OUTPATIENT)
Dept: LAB | Facility: HOSPITAL | Age: 86
End: 2021-06-04
Payer: COMMERCIAL

## 2021-06-04 DIAGNOSIS — R74.01 TRANSAMINITIS: ICD-10-CM

## 2021-06-04 DIAGNOSIS — K75.4 AUTOIMMUNE HEPATITIS (HCC): ICD-10-CM

## 2021-06-04 LAB
25(OH)D3 SERPL-MCNC: 29.4 NG/ML (ref 30–100)
ALBUMIN SERPL BCP-MCNC: 3.1 G/DL (ref 3.5–5)
ALP SERPL-CCNC: 50 U/L (ref 46–116)
ALT SERPL W P-5'-P-CCNC: 38 U/L (ref 12–78)
ANION GAP SERPL CALCULATED.3IONS-SCNC: 9 MMOL/L (ref 4–13)
AST SERPL W P-5'-P-CCNC: 19 U/L (ref 5–45)
BASOPHILS # BLD AUTO: 0.06 THOUSANDS/ΜL (ref 0–0.1)
BASOPHILS NFR BLD AUTO: 1 % (ref 0–1)
BILIRUB DIRECT SERPL-MCNC: 0.17 MG/DL (ref 0–0.2)
BILIRUB SERPL-MCNC: 0.66 MG/DL (ref 0.2–1)
BUN SERPL-MCNC: 22 MG/DL (ref 5–25)
CALCIUM ALBUM COR SERPL-MCNC: 9.9 MG/DL (ref 8.3–10.1)
CALCIUM SERPL-MCNC: 9.2 MG/DL (ref 8.3–10.1)
CHLORIDE SERPL-SCNC: 108 MMOL/L (ref 100–108)
CO2 SERPL-SCNC: 24 MMOL/L (ref 21–32)
CREAT SERPL-MCNC: 0.92 MG/DL (ref 0.6–1.3)
EOSINOPHIL # BLD AUTO: 0.12 THOUSAND/ΜL (ref 0–0.61)
EOSINOPHIL NFR BLD AUTO: 1 % (ref 0–6)
ERYTHROCYTE [DISTWIDTH] IN BLOOD BY AUTOMATED COUNT: 13.7 % (ref 11.6–15.1)
GFR SERPL CREATININE-BSD FRML MDRD: 54 ML/MIN/1.73SQ M
GLUCOSE SERPL-MCNC: 152 MG/DL (ref 65–140)
HCT VFR BLD AUTO: 43.5 % (ref 34.8–46.1)
HGB BLD-MCNC: 14.2 G/DL (ref 11.5–15.4)
IMM GRANULOCYTES # BLD AUTO: 0.18 THOUSAND/UL (ref 0–0.2)
IMM GRANULOCYTES NFR BLD AUTO: 2 % (ref 0–2)
INR PPP: 1.01 (ref 0.84–1.19)
LYMPHOCYTES # BLD AUTO: 3.82 THOUSANDS/ΜL (ref 0.6–4.47)
LYMPHOCYTES NFR BLD AUTO: 32 % (ref 14–44)
MCH RBC QN AUTO: 30.8 PG (ref 26.8–34.3)
MCHC RBC AUTO-ENTMCNC: 32.6 G/DL (ref 31.4–37.4)
MCV RBC AUTO: 94 FL (ref 82–98)
MONOCYTES # BLD AUTO: 0.86 THOUSAND/ΜL (ref 0.17–1.22)
MONOCYTES NFR BLD AUTO: 7 % (ref 4–12)
NEUTROPHILS # BLD AUTO: 7.03 THOUSANDS/ΜL (ref 1.85–7.62)
NEUTS SEG NFR BLD AUTO: 57 % (ref 43–75)
NRBC BLD AUTO-RTO: 0 /100 WBCS
PLATELET # BLD AUTO: 290 THOUSANDS/UL (ref 149–390)
PMV BLD AUTO: 10.2 FL (ref 8.9–12.7)
POTASSIUM SERPL-SCNC: 3.5 MMOL/L (ref 3.5–5.3)
PROT SERPL-MCNC: 6.7 G/DL (ref 6.4–8.2)
PROTHROMBIN TIME: 12.8 SECONDS (ref 11.6–14.5)
RBC # BLD AUTO: 4.61 MILLION/UL (ref 3.81–5.12)
SODIUM SERPL-SCNC: 141 MMOL/L (ref 136–145)
WBC # BLD AUTO: 12.07 THOUSAND/UL (ref 4.31–10.16)

## 2021-06-04 PROCEDURE — 85025 COMPLETE CBC W/AUTO DIFF WBC: CPT

## 2021-06-04 PROCEDURE — 82248 BILIRUBIN DIRECT: CPT

## 2021-06-04 PROCEDURE — 82306 VITAMIN D 25 HYDROXY: CPT

## 2021-06-04 PROCEDURE — 80053 COMPREHEN METABOLIC PANEL: CPT

## 2021-06-04 PROCEDURE — 85610 PROTHROMBIN TIME: CPT

## 2021-06-04 PROCEDURE — 36415 COLL VENOUS BLD VENIPUNCTURE: CPT

## 2021-06-09 ENCOUNTER — OFFICE VISIT (OUTPATIENT)
Dept: GASTROENTEROLOGY | Facility: CLINIC | Age: 86
End: 2021-06-09
Payer: COMMERCIAL

## 2021-06-09 VITALS
SYSTOLIC BLOOD PRESSURE: 128 MMHG | HEIGHT: 59 IN | HEART RATE: 68 BPM | WEIGHT: 115 LBS | BODY MASS INDEX: 23.18 KG/M2 | DIASTOLIC BLOOD PRESSURE: 70 MMHG

## 2021-06-09 DIAGNOSIS — C18.2 MALIGNANT NEOPLASM OF ASCENDING COLON (HCC): Primary | ICD-10-CM

## 2021-06-09 DIAGNOSIS — K75.4 AUTOIMMUNE HEPATITIS (HCC): ICD-10-CM

## 2021-06-09 PROCEDURE — 1036F TOBACCO NON-USER: CPT | Performed by: PHYSICIAN ASSISTANT

## 2021-06-09 PROCEDURE — 99213 OFFICE O/P EST LOW 20 MIN: CPT | Performed by: PHYSICIAN ASSISTANT

## 2021-06-09 PROCEDURE — 1160F RVW MEDS BY RX/DR IN RCRD: CPT | Performed by: PHYSICIAN ASSISTANT

## 2021-06-09 NOTE — PROGRESS NOTES
[facility] - Outpatient Follow-up Note  Nirali May Cuani 80 y o  female MRN: [id number]  Encounter: [id number]          ASSESSMENT AND PLAN:      1  Malignant neoplasm of ascending colon ([facility] [address] )  2  Autoimmune hepatitis ([facility] [address] )  Her liver enzymes remain normal on Prednisone and Imuran  Her doses are currently Prednisone 25mg and Imuran 50mg  She is going to drop her Prednisone to 20mg  Will check labs in 1 month prior to next appt    Her daughter continues to be very concerned about her lack of energy and excessive fatigue  We had a long, candid discussion today about how Abran Smith has likely become severely deconditioned over the past 6 months  She has an active cancer which can be draining of energy  She isnt eating well and not getting enough protein  I explained she likely had a false elevation in her energy when she was on a higher dose of prednisone  I advised she try to get her mom to eat more protein and calories in general  I asked her to speak with her PCP about possibly starting home PT again    She is seeing a geriatric medicine doctor next week for an opinion    Her daughter is asking for a repeat CT scan to try to assess for any spread of the cancer  ______________________________________________________________________    SUBJECTIVE:    57-year-old female with autoimmune hepatitis and an adenocarcinoma of the ascending colon presents for routine follow-up  Her liver enzymes remain normal   She is on prednisone 25 mg and Imuran 50 mg  After her last appointment her daughter became increasingly concerned about her mom's energy, poor appetite and confusion  She followed up with her PCP who suggested the patient had early stages of dementia  The patient's daughter reports that she sleeps most of the day  She does not want to get up and do things as she is severely fatigued  She is only very small portions of food and will not eat unless encouraged by her daughter    Abran Smith denies any abdominal pain, nausea, vomiting, diarrhea, constipation or rectal bleeding  Her weight is stable  Concerned about medication side effects normal daughter had her stop her Imuran for 2 weeks without any improvement in her symptoms  She is concerned that the prednisone could be contributing to the symptoms  Her labs including CBC and CMP have been consistently normal except for a slight elevation of white blood cell count likely due to prednisone therapy  Hungceline King also denies any fevers, chills or urinary symptoms  REVIEW OF SYSTEMS IS OTHERWISE NEGATIVE  Historical Information   Past Medical History:   Diagnosis Date    Blindness and low vision     Hearing loss     Hyperlipidemia     Osteoporosis     Sciatica      Past Surgical History:   Procedure Laterality Date     SECTION      COLONOSCOPY      IR BIOPSY LIVER RANDOM  3/5/2021     Social History   Social History     Substance and Sexual Activity   Alcohol Use Not Currently     Social History     Substance and Sexual Activity   Drug Use No     Social History     Tobacco Use   Smoking Status Never Smoker   Smokeless Tobacco Never Used     Family History   Problem Relation Age of Onset    Diabetes type II Mother     Pancreatic cancer Father     Colon cancer Sister        Meds/Allergies       Current Outpatient Medications:     cetirizine (ZyrTEC) 10 mg tablet    Cholecalciferol ([employer]S VITAMIN D) 2000 units CAPS    Multiple Vitamins-Minerals (CENTRUM SILVER 50+WOMEN) TABS    pantoprazole (PROTONIX) 40 mg tablet    predniSONE 5 mg tablet    RHOPRESSA 0 02 % SOLN    SIMBRINZA 1-0 2 % SUSP    VYZULTA 0 024 % SOLN    azaTHIOprine (IMURAN) 50 mg tablet    predniSONE 10 mg tablet    No Known Allergies        Objective     Blood pressure 128/70, pulse 68, height 4' 11" (1 499 m), weight 52 2 kg (115 lb), not currently breastfeeding  Body mass index is 23 23 kg/m²        PHYSICAL EXAM:      General Appearance:   Alert, cooperative, no distress   HEENT:   Normocephalic, atraumatic, anicteric      Neck:  Supple, symmetrical, trachea midline   Lungs:   Clear to auscultation bilaterally; no rales, rhonchi or wheezing; respirations unlabored    Heart[de-identified]   Regular rate and rhythm; no murmur, rub, or gallop  Abdomen:   Soft, non-tender, non-distended; normal bowel sounds; no masses, no organomegaly    Genitalia:   Deferred    Rectal:   Deferred    Extremities:  No cyanosis, clubbing or edema    Pulses:  2+ and symmetric    Skin:  No jaundice, rashes, or lesions    Lymph nodes:  No palpable cervical lymphadenopathy        Lab Results:   No visits with results within 1 Day(s) from this visit     Latest known visit with results is:   Appointment on 06/04/2021   Component Date Value    WBC 06/04/2021 12 07*    RBC 06/04/2021 4 61     Hemoglobin 06/04/2021 14 2     Hematocrit 06/04/2021 43 5     MCV 06/04/2021 94     MCH 06/04/2021 30 8     MCHC 06/04/2021 32 6     RDW 06/04/2021 13 7     MPV 06/04/2021 10 2     Platelets 94/32/9376 290     nRBC 06/04/2021 0     Neutrophils Relative 06/04/2021 57     Immat GRANS % 06/04/2021 2     Lymphocytes Relative 06/04/2021 32     Monocytes Relative 06/04/2021 7     Eosinophils Relative 06/04/2021 1     Basophils Relative 06/04/2021 1     Neutrophils Absolute 06/04/2021 7 03     Immature Grans Absolute 06/04/2021 0 18     Lymphocytes Absolute 06/04/2021 3 82     Monocytes Absolute 06/04/2021 0 86     Eosinophils Absolute 06/04/2021 0 12     Basophils Absolute 06/04/2021 0 06     Vit D, 25-Hydroxy 06/04/2021 29 4*    Sodium 06/04/2021 141     Potassium 06/04/2021 3 5     Chloride 06/04/2021 108     CO2 06/04/2021 24     ANION GAP 06/04/2021 9     BUN 06/04/2021 22     Creatinine 06/04/2021 0 92     Glucose 06/04/2021 152*    Calcium 06/04/2021 9 2     Corrected Calcium 06/04/2021 9 9     AST 06/04/2021 19     ALT 06/04/2021 38     Alkaline Phosphatase 06/04/2021 50     Total Protein 06/04/2021 6 7     Albumin 06/04/2021 3 1*    Total Bilirubin 06/04/2021 0 66     eGFR 06/04/2021 54     Protime 06/04/2021 12 8     INR 06/04/2021 1 01     Bilirubin, Direct 06/04/2021 0 17          Radiology Results:   No results found

## 2021-06-10 ENCOUNTER — TELEPHONE (OUTPATIENT)
Dept: GASTROENTEROLOGY | Facility: CLINIC | Age: 86
End: 2021-06-10

## 2021-06-10 NOTE — TELEPHONE ENCOUNTER
Patients daughter called and wanted to talk with you about stopping the Imuran  She states you discussed it yesterday but the more she thinks about it, the side effects are exactly what her mom is experiencing  She is always tired, confused and doesn't want to eat  She is thinking about stopping the Imuran and keeping with the 20mg of Prednisone but wanted your opinion on this

## 2021-06-14 ENCOUNTER — TELEPHONE (OUTPATIENT)
Dept: INTERNAL MEDICINE CLINIC | Facility: CLINIC | Age: 86
End: 2021-06-14

## 2021-06-14 DIAGNOSIS — K75.4 AUTOIMMUNE HEPATITIS (HCC): ICD-10-CM

## 2021-06-14 DIAGNOSIS — K63.89 COLONIC MASS: Primary | ICD-10-CM

## 2021-06-14 DIAGNOSIS — R26.9 NEUROLOGIC GAIT DYSFUNCTION: ICD-10-CM

## 2021-06-14 NOTE — TELEPHONE ENCOUNTER
Daughter Rupert Duverney) called for mother  She has been losing muscle mass and was seen by Devon Otto PA-C notes signed by Joseph Al DO  From     REC: Physical Therapy    Daughter was please with [address] [facility], [place]     Would like a referral for sent for her mother    Cell# [phone]

## 2021-06-21 NOTE — TELEPHONE ENCOUNTER
Pt's daughter called back; Allison Calderon about [facility] for the PT    I told her that the referral was Faxed to them on [address]

## 2021-06-22 ENCOUNTER — TELEPHONE (OUTPATIENT)
Dept: GASTROENTEROLOGY | Facility: CLINIC | Age: 86
End: 2021-06-22

## 2021-06-22 ENCOUNTER — HOSPITAL ENCOUNTER (OUTPATIENT)
Dept: RADIOLOGY | Facility: MEDICAL CENTER | Age: 86
Discharge: HOME | End: 2021-06-22
Payer: COMMERCIAL

## 2021-06-22 DIAGNOSIS — C18.2 MALIGNANT NEOPLASM OF ASCENDING COLON (HCC): ICD-10-CM

## 2021-06-22 PROCEDURE — 74176 CT ABD & PELVIS W/O CONTRAST: CPT

## 2021-06-22 PROCEDURE — G1004 CDSM NDSC: HCPCS

## 2021-06-22 RX ADMIN — IOHEXOL 50 ML: 240 INJECTION, SOLUTION INTRATHECAL; INTRAVASCULAR; INTRAVENOUS; ORAL at 12:02

## 2021-06-22 NOTE — TELEPHONE ENCOUNTER
Spoke with Ramírez Nails patient's daughter  Advised for patient to take 1 capful of Miralax two times a day  Ramírez Nails stated they did the CT Scan without dye due to not being able to access a vein

## 2021-06-22 NOTE — TELEPHONE ENCOUNTER
Please have her start taking miralax 1 capful twice daily    I tried to call radiology as well and left a message

## 2021-06-22 NOTE — TELEPHONE ENCOUNTER
The cat scan department from [address] [place] [address] needs to speak to someone re this patient     There was a problem today      Please phone [phone]

## 2021-06-22 NOTE — TELEPHONE ENCOUNTER
Called CT scan department  LMOM to call back  Patient daughter Ector Ornelas also called - please advise  Thanks

## 2021-06-22 NOTE — TELEPHONE ENCOUNTER
Wagner Soler patient - Maryellen Presume called and LMOM to RTRN her call ([phone]) about patient being constipated for about 3 days  She would like to know what to do about this?   Clara

## 2021-06-25 ENCOUNTER — TELEPHONE (OUTPATIENT)
Dept: GASTROENTEROLOGY | Facility: CLINIC | Age: 86
End: 2021-06-25

## 2021-06-25 NOTE — TELEPHONE ENCOUNTER
I spoke with Jayce Quiroz  She has not had a BM x 1 1/2 weeks     Will f/u CT  Advised suppository or enema followed by large dose of miralax (5-6 doses in 20oz liquid) and then miralax daily

## 2021-06-25 NOTE — TELEPHONE ENCOUNTER
Marilu patient - Isaac Murphy called as thiago is having a lot of pain on the left side of her abdomen  Please RTRN call to [phone]    Thx

## 2021-07-01 ENCOUNTER — TELEPHONE (OUTPATIENT)
Dept: INTERNAL MEDICINE CLINIC | Facility: CLINIC | Age: 86
End: 2021-07-01

## 2021-07-01 NOTE — TELEPHONE ENCOUNTER
She had scan done to ck size of colon tumor    which revealed a spot on her breast   Was told to f/u w/Dr Lucero to see what testing he wants done re the spot on breast

## 2021-07-09 ENCOUNTER — TELEPHONE (OUTPATIENT)
Dept: GASTROENTEROLOGY | Facility: CLINIC | Age: 86
End: 2021-07-09

## 2021-07-09 NOTE — TELEPHONE ENCOUNTER
Jennifer Chauhan patient - Daphnie Saez called and does not have the scripts for PT & OT  That Jennifer Chauhan had ordered  Please RTRN call to [phone]    Thx

## 2021-07-12 NOTE — TELEPHONE ENCOUNTER
Giselle Martinez patient - [facility] called again and asked about the scripts for PT/Ot and I informed her what Giselle Martinez said, "[facility] office is handling this"  She said ok and asked if Giselle Martinez will put into the chart the orders for the labs prior to patient's office visit on 7/20/21  Please call [facility] at [phone]    Thx

## 2021-07-13 ENCOUNTER — OFFICE VISIT (OUTPATIENT)
Dept: INTERNAL MEDICINE CLINIC | Facility: CLINIC | Age: 86
End: 2021-07-13
Payer: COMMERCIAL

## 2021-07-13 VITALS
DIASTOLIC BLOOD PRESSURE: 68 MMHG | OXYGEN SATURATION: 98 % | HEIGHT: 59 IN | HEART RATE: 76 BPM | WEIGHT: 118 LBS | SYSTOLIC BLOOD PRESSURE: 112 MMHG | TEMPERATURE: 97.6 F | BODY MASS INDEX: 23.79 KG/M2

## 2021-07-13 DIAGNOSIS — K75.4 AUTOIMMUNE HEPATITIS (HCC): ICD-10-CM

## 2021-07-13 DIAGNOSIS — K63.89 COLONIC MASS: Primary | ICD-10-CM

## 2021-07-13 DIAGNOSIS — G31.84 MCI (MILD COGNITIVE IMPAIRMENT): ICD-10-CM

## 2021-07-13 DIAGNOSIS — H35.30 MACULAR DEGENERATION OF BOTH EYES, UNSPECIFIED TYPE: ICD-10-CM

## 2021-07-13 DIAGNOSIS — R26.9 NEUROLOGIC GAIT DYSFUNCTION: ICD-10-CM

## 2021-07-13 DIAGNOSIS — N63.0 BREAST MASS: ICD-10-CM

## 2021-07-13 PROCEDURE — 1160F RVW MEDS BY RX/DR IN RCRD: CPT | Performed by: INTERNAL MEDICINE

## 2021-07-13 PROCEDURE — 1036F TOBACCO NON-USER: CPT | Performed by: INTERNAL MEDICINE

## 2021-07-13 PROCEDURE — 99215 OFFICE O/P EST HI 40 MIN: CPT | Performed by: INTERNAL MEDICINE

## 2021-07-13 NOTE — PATIENT INSTRUCTIONS
Labs and CT reviewed    Right breast lesion-we discussed current clinical scenario with colon mass that is presumed malignant but patient has opted to forego further treatment  In this regard we discussed the utility of mammogram and potential for follow-up biopsy and consideration for treatment should it be warranted  After discussion we opted not to pursue any further diagnostic intervention at this time  Colon mass appears stable on noncontrast CT with no definitive evidence of metastatic disease  Patient is not having any melena, pain, weight loss and appetite has improved since coming off Imuran  Autoimmune hepatitis-liver enzymes had normalized  Patient now off Imuran and feeling better  Follow-up liver enzymes with GI next week    Generalized weakness and debility-will get back to home physical therapy  Patient is homebound as she is blind and does not drive  Goals of care-we discussed continuing with palliative care modalities for any symptoms as they arise  We discussed consideration of hospice care  We will put this on hold pending lab work and results of physical therapy  Follow-up in 6-8 weeks, sooner as needed

## 2021-07-13 NOTE — PROGRESS NOTES
Assessment/Plan:    Diagnoses and all orders for this visit:    Colonic mass  -     Ambulatory Referral to [address] [place] [address] [facility]; Future    Autoimmune hepatitis ([address] )  -     Ambulatory Referral to Home Health; Future    Neurologic gait dysfunction  -     Ambulatory Referral to Home Health; Future    MCI (mild cognitive impairment)  -     Ambulatory Referral to Home Health; Future    Macular degeneration of both eyes, unspecified type  -     Ambulatory Referral to [address] [place] [address] [facility]; Future    Breast mass  -     Ambulatory Referral to Home Health; Future              Patient Instructions   Labs and CT reviewed    Right breast lesion-we discussed current clinical scenario with colon mass that is presumed malignant but patient has opted to forego further treatment  In this regard we discussed the utility of mammogram and potential for follow-up biopsy and consideration for treatment should it be warranted  After discussion we opted not to pursue any further diagnostic intervention at this time  Colon mass appears stable on noncontrast CT with no definitive evidence of metastatic disease  Patient is not having any melena, pain, weight loss and appetite has improved since coming off Imuran  Autoimmune hepatitis-liver enzymes had normalized  Patient now off Imuran and feeling better  Follow-up liver enzymes with GI next week    Generalized weakness and debility-will get back to home physical therapy  Patient is homebound as she is blind and does not drive  Goals of care-we discussed continuing with palliative care modalities for any symptoms as they arise  We discussed consideration of hospice care  We will put this on hold pending lab work and results of physical therapy  Follow-up in 6-8 weeks, sooner as needed  Subjective:      Patient ID: Sandra Rodriguez is a 80 y o  female    F/u MMP  Feeling ok, appetite stable, improved since coming off imuran and more energy  Still generally weak and walking w/ walker  Balance is an issue, but no falss  Breast Mass-incidental R breast nodule noted on surveillance CT, GI referred back here to w/u further  Autoimmune Hep-stopped imuran, continues on 20 mg prednisone, LFT's good 6/4, f/u labs next mo  Colon mass presumed malignant-stable ct, no pain, no bleeding, no wt loss            Current Outpatient Medications:     Cholecalciferol (CVS VITAMIN D) 2000 units CAPS, Take by mouth, Disp: , Rfl:     Multiple Vitamins-Minerals (CENTRUM SILVER 50+WOMEN) TABS, Take by mouth , Disp: , Rfl:     predniSONE 5 mg tablet, TAKE AS DIRECTED (Patient taking differently: 20 mg TAKE AS DIRECTED), Disp: 60 tablet, Rfl: 0    RHOPRESSA 0 02 % SOLN, instill 1 drop into both eyes at bedtime, Disp: , Rfl: 0    SIMBRINZA 1-0 2 % SUSP, , Disp: , Rfl: 0    VYZULTA 0 024 % SOLN, , Disp: , Rfl: 0    azaTHIOprine (IMURAN) 50 mg tablet, Take 1 tablet (50 mg total) by mouth daily (Patient not taking: Reported on 5/18/2021), Disp: 90 tablet, Rfl: 3    cetirizine (ZyrTEC) 10 mg tablet, Take 10 mg by mouth daily (Patient not taking: Reported on 7/13/2021), Disp: , Rfl:     pantoprazole (PROTONIX) 40 mg tablet, Take 1 tablet (40 mg total) by mouth daily before breakfast (Patient not taking: Reported on 7/13/2021), Disp: 30 tablet, Rfl: 5    Recent Results (from the past 1008 hour(s))   CBC and differential    Collection Time: 06/04/21  7:35 AM   Result Value Ref Range    WBC 12 07 (H) 4 31 - 10 16 Thousand/uL    RBC 4 61 3 81 - 5 12 Million/uL    Hemoglobin 14 2 11 5 - 15 4 g/dL    Hematocrit 43 5 34 8 - 46 1 %    MCV 94 82 - 98 fL    MCH 30 8 26 8 - 34 3 pg    MCHC 32 6 31 4 - 37 4 g/dL    RDW 13 7 11 6 - 15 1 %    MPV 10 2 8 9 - 12 7 fL    Platelets 996 908 - 742 Thousands/uL    nRBC 0 /100 WBCs    Neutrophils Relative 57 43 - 75 %    Immat GRANS % 2 0 - 2 %    Lymphocytes Relative 32 14 - 44 %    Monocytes Relative 7 4 - 12 %    Eosinophils Relative 1 0 - 6 %    Basophils Relative 1 0 - 1 % Neutrophils Absolute 7 03 1 85 - 7 62 Thousands/µL    Immature Grans Absolute 0 18 0 00 - 0 20 Thousand/uL    Lymphocytes Absolute 3 82 0 60 - 4 47 Thousands/µL    Monocytes Absolute 0 86 0 17 - 1 22 Thousand/µL    Eosinophils Absolute 0 12 0 00 - 0 61 Thousand/µL    Basophils Absolute 0 06 0 00 - 0 10 Thousands/µL   Vitamin D 25 hydroxy    Collection Time: 06/04/21  7:35 AM   Result Value Ref Range    Vit D, 25-Hydroxy 29 4 (L) 30 0 - 100 0 ng/mL   Comprehensive metabolic panel    Collection Time: 06/04/21  7:35 AM   Result Value Ref Range    Sodium 141 136 - 145 mmol/L    Potassium 3 5 3 5 - 5 3 mmol/L    Chloride 108 100 - 108 mmol/L    CO2 24 21 - 32 mmol/L    ANION GAP 9 4 - 13 mmol/L    BUN 22 5 - 25 mg/dL    Creatinine 0 92 0 60 - 1 30 mg/dL    Glucose 152 (H) 65 - 140 mg/dL    Calcium 9 2 8 3 - 10 1 mg/dL    Corrected Calcium 9 9 8 3 - 10 1 mg/dL    AST 19 5 - 45 U/L    ALT 38 12 - 78 U/L    Alkaline Phosphatase 50 46 - 116 U/L    Total Protein 6 7 6 4 - 8 2 g/dL    Albumin 3 1 (L) 3 5 - 5 0 g/dL    Total Bilirubin 0 66 0 20 - 1 00 mg/dL    eGFR 54 ml/min/1 73sq m   Protime-INR    Collection Time: 06/04/21  7:35 AM   Result Value Ref Range    Protime 12 8 11 6 - 14 5 seconds    INR 1 01 0 84 - 1 19   Bilirubin, direct    Collection Time: 06/04/21  7:35 AM   Result Value Ref Range    Bilirubin, Direct 0 17 0 00 - 0 20 mg/dL       The following portions of the patient's history were reviewed and updated as appropriate: allergies, current medications, past family history, past medical history, past social history, past surgical history and problem list      Review of Systems   Constitutional: Negative for appetite change, chills, diaphoresis, fatigue, fever and unexpected weight change  HENT: Negative for congestion, hearing loss and rhinorrhea  Eyes: Negative for visual disturbance  Respiratory: Negative for cough, chest tightness, shortness of breath and wheezing      Cardiovascular: Negative for chest pain, palpitations and leg swelling  Gastrointestinal: Negative for abdominal pain and blood in stool  Endocrine: Negative for cold intolerance, heat intolerance, polydipsia and polyuria  Genitourinary: Negative for difficulty urinating, dysuria, frequency and urgency  Musculoskeletal: Positive for gait problem  Negative for arthralgias and myalgias  Skin: Negative for rash  Neurological: Negative for dizziness, weakness, light-headedness and headaches  Hematological: Does not bruise/bleed easily  Psychiatric/Behavioral: Negative for dysphoric mood and sleep disturbance  Objective:      Vitals:    07/13/21 1436   BP: 112/68   Pulse: 76   Temp: 97 6 °F (36 4 °C)   SpO2: 98%          Physical Exam  Constitutional:       Appearance: She is well-developed  HENT:      Head: Normocephalic and atraumatic  Nose: Nose normal    Eyes:      General: No scleral icterus  Conjunctiva/sclera: Conjunctivae normal       Pupils: Pupils are equal, round, and reactive to light  Neck:      Thyroid: No thyromegaly  Vascular: No JVD  Trachea: No tracheal deviation  Cardiovascular:      Rate and Rhythm: Normal rate and regular rhythm  Heart sounds: No murmur heard  No friction rub  No gallop  Pulmonary:      Effort: Pulmonary effort is normal  No respiratory distress  Breath sounds: Normal breath sounds  No wheezing or rales  Musculoskeletal:         General: No deformity  Cervical back: Normal range of motion and neck supple  Lymphadenopathy:      Cervical: No cervical adenopathy  Skin:     General: Skin is warm and dry  Coloration: Skin is not pale  Findings: No erythema or rash  Neurological:      Mental Status: She is alert and oriented to person, place, and time  Cranial Nerves: No cranial nerve deficit  Psychiatric:         Behavior: Behavior normal          Thought Content:  Thought content normal          Judgment: Judgment normal

## 2021-07-14 NOTE — TELEPHONE ENCOUNTER
Called and spoke to Issa Owen, daughter, was told it is easier for them to take pt to Dr Wagner Mccabe   Wants appt for Mitch William canceled for now and if Dr Wagner Mccabe wants pt to see Mitch William then they will make an appt       Canceled appt for the 20th

## 2021-07-15 ENCOUNTER — TELEPHONE (OUTPATIENT)
Dept: INTERNAL MEDICINE CLINIC | Facility: CLINIC | Age: 86
End: 2021-07-15

## 2021-07-15 NOTE — TELEPHONE ENCOUNTER
[phone]    Not going to change functional status  No need for home care PT  Safe with transfers, walking, and equipment     Daughter says no hospice for now

## 2021-07-23 NOTE — TELEPHONE ENCOUNTER
Bart Juárez would like to have you go ahead and start the procedure for Gabi Rooney to receive hospice care  Please advise her on what she needs to do      CB# [phone]

## 2021-07-27 NOTE — TELEPHONE ENCOUNTER
Pt is on hospice now    They want to get her into assisted living   in order to do that she needs VA benefits     So they need her monthly medical costs     is asking how often she would be seeing Dr Prince Koehler, to get an idea of the cost  also asking if she can still see her eye Dr for treatment for macular degeneration and glaucoma  Ferol Joshua ? ??

## 2021-07-28 ENCOUNTER — TELEPHONE (OUTPATIENT)
Dept: GERIATRICS | Age: 86
End: 2021-07-28

## 2021-07-28 NOTE — TELEPHONE ENCOUNTER
Called patient's daughter, Osvaldo Sim, to inquire if would like to keep October 2021 appointment; as patient had Elizabeth appt with [facility]  Daughter relayed patient is on Hospice and would like to cancel Elizabeth appointments  This MR will cancel appointments

## 2021-08-06 DIAGNOSIS — K75.4 AUTOIMMUNE HEPATITIS (HCC): ICD-10-CM

## 2021-08-06 RX ORDER — PREDNISONE 1 MG/1
TABLET ORAL
Qty: 60 TABLET | Refills: 0 | Status: SHIPPED | OUTPATIENT
Start: 2021-08-06

## 2021-08-08 NOTE — SEDATION DOCUMENTATION
Procedure completed and pt tolerated well - pt returned to [facility] and report to RN - pt placed on rt side
No

## 2021-08-09 RX ORDER — PREDNISONE 10 MG/1
40 TABLET ORAL DAILY
Qty: 360 TABLET | Refills: 0 | Status: SHIPPED | OUTPATIENT
Start: 2021-08-09 | End: 2021-11-07

## 2024-11-08 NOTE — ADDENDUM NOTE
Addended by: Abi Bello on: 5/14/2021 11:33 AM     Modules accepted: Orders Group Topic: BH Activity Group    Date: 11/7/2024  Start Time: 1715  End Time: 1800  Facilitators: Mayur Parsons    Focus: Leisure Group with Marlene VELASCO  Number in attendance: 10    Method: Group   Attendance: Present  Participation: Active  Patient Response: Interested in topic  Mood: Depressed  Affect: Type: Depressed   Range: Blunted/flat   Congruency: Congruent   Stability: Stable  Behavior/Socialization: Engaged  Thought Process: Focused  Task Performance: Follows directions  Patient Evaluation: Independent - full participation